# Patient Record
Sex: FEMALE | Race: BLACK OR AFRICAN AMERICAN | Employment: FULL TIME | URBAN - METROPOLITAN AREA
[De-identification: names, ages, dates, MRNs, and addresses within clinical notes are randomized per-mention and may not be internally consistent; named-entity substitution may affect disease eponyms.]

---

## 2017-02-03 ENCOUNTER — HOSPITAL ENCOUNTER (OUTPATIENT)
Dept: MAMMOGRAPHY | Age: 52
Discharge: HOME | End: 2017-02-03
Attending: INTERNAL MEDICINE
Payer: COMMERCIAL

## 2017-02-03 DIAGNOSIS — N63.20 MASS OF BREAST, LEFT: ICD-10-CM

## 2017-02-03 DIAGNOSIS — R92.1 BREAST CALCIFICATION, RIGHT: ICD-10-CM

## 2017-02-03 PROCEDURE — 77066 DX MAMMO INCL CAD BI: CPT

## 2017-03-21 ENCOUNTER — HOSPITAL ENCOUNTER (EMERGENCY)
Age: 52
Discharge: HOME | End: 2017-03-21
Attending: EMERGENCY MEDICINE | Admitting: EMERGENCY MEDICINE
Payer: COMMERCIAL

## 2017-03-21 VITALS
SYSTOLIC BLOOD PRESSURE: 137 MMHG | HEART RATE: 101 BPM | TEMPERATURE: 100.5 F | BODY MASS INDEX: 50.61 KG/M2 | OXYGEN SATURATION: 99 % | DIASTOLIC BLOOD PRESSURE: 74 MMHG | WEIGHT: 275 LBS | RESPIRATION RATE: 18 BRPM | HEIGHT: 62 IN

## 2017-03-21 DIAGNOSIS — J02.0 ACUTE STREPTOCOCCAL PHARYNGITIS: ICD-10-CM

## 2017-03-21 DIAGNOSIS — N30.00 ACUTE CYSTITIS WITHOUT HEMATURIA: Primary | ICD-10-CM

## 2017-03-21 LAB
ALBUMIN SERPL BCP-MCNC: 3.2 G/DL (ref 3.4–5)
ALBUMIN/GLOB SERPL: 0.7 {RATIO} (ref 0.8–1.7)
ALP SERPL-CCNC: 89 U/L (ref 45–117)
ALT SERPL-CCNC: 18 U/L (ref 13–56)
ANION GAP BLD CALC-SCNC: 7 MMOL/L (ref 3–18)
APPEARANCE UR: CLEAR
AST SERPL W P-5'-P-CCNC: 12 U/L (ref 15–37)
BACTERIA URNS QL MICRO: ABNORMAL /HPF
BASOPHILS # BLD AUTO: 0 K/UL (ref 0–0.06)
BASOPHILS # BLD: 0 % (ref 0–2)
BILIRUB SERPL-MCNC: 0.3 MG/DL (ref 0.2–1)
BILIRUB UR QL: NEGATIVE
BUN SERPL-MCNC: 8 MG/DL (ref 7–18)
BUN/CREAT SERPL: 11 (ref 12–20)
CALCIUM SERPL-MCNC: 9.8 MG/DL (ref 8.5–10.1)
CHLORIDE SERPL-SCNC: 98 MMOL/L (ref 100–108)
CO2 SERPL-SCNC: 31 MMOL/L (ref 21–32)
COLOR UR: YELLOW
CREAT SERPL-MCNC: 0.74 MG/DL (ref 0.6–1.3)
DIFFERENTIAL METHOD BLD: ABNORMAL
EOSINOPHIL # BLD: 0 K/UL (ref 0–0.4)
EOSINOPHIL NFR BLD: 0 % (ref 0–5)
EPITH CASTS URNS QL MICRO: ABNORMAL /LPF (ref 0–5)
ERYTHROCYTE [DISTWIDTH] IN BLOOD BY AUTOMATED COUNT: 15.7 % (ref 11.6–14.5)
FLUAV AG NPH QL IA: NEGATIVE
FLUBV AG NOSE QL IA: NEGATIVE
GLOBULIN SER CALC-MCNC: 4.7 G/DL (ref 2–4)
GLUCOSE SERPL-MCNC: 189 MG/DL (ref 74–99)
GLUCOSE UR STRIP.AUTO-MCNC: NEGATIVE MG/DL
HCT VFR BLD AUTO: 35.6 % (ref 35–45)
HGB BLD-MCNC: 11.3 G/DL (ref 12–16)
HGB UR QL STRIP: NEGATIVE
KETONES UR QL STRIP.AUTO: NEGATIVE MG/DL
LEUKOCYTE ESTERASE UR QL STRIP.AUTO: ABNORMAL
LIPASE SERPL-CCNC: 46 U/L (ref 73–393)
LYMPHOCYTES # BLD AUTO: 16 % (ref 21–52)
LYMPHOCYTES # BLD: 2.4 K/UL (ref 0.9–3.6)
MCH RBC QN AUTO: 24.2 PG (ref 24–34)
MCHC RBC AUTO-ENTMCNC: 31.7 G/DL (ref 31–37)
MCV RBC AUTO: 76.4 FL (ref 74–97)
MONOCYTES # BLD: 1.2 K/UL (ref 0.05–1.2)
MONOCYTES NFR BLD AUTO: 8 % (ref 3–10)
MUCOUS THREADS URNS QL MICRO: ABNORMAL /LPF
NEUTS SEG # BLD: 11.5 K/UL (ref 1.8–8)
NEUTS SEG NFR BLD AUTO: 76 % (ref 40–73)
NITRITE UR QL STRIP.AUTO: NEGATIVE
PH UR STRIP: 8 [PH] (ref 5–8)
PLATELET # BLD AUTO: 380 K/UL (ref 135–420)
PMV BLD AUTO: 10.4 FL (ref 9.2–11.8)
POTASSIUM SERPL-SCNC: 3.5 MMOL/L (ref 3.5–5.5)
PROT SERPL-MCNC: 7.9 G/DL (ref 6.4–8.2)
PROT UR STRIP-MCNC: ABNORMAL MG/DL
RBC # BLD AUTO: 4.66 M/UL (ref 4.2–5.3)
RBC #/AREA URNS HPF: ABNORMAL /HPF (ref 0–5)
SODIUM SERPL-SCNC: 136 MMOL/L (ref 136–145)
SP GR UR REFRACTOMETRY: 1.02 (ref 1–1.03)
UROBILINOGEN UR QL STRIP.AUTO: 1 EU/DL (ref 0.2–1)
WBC # BLD AUTO: 15.1 K/UL (ref 4.6–13.2)
WBC URNS QL MICRO: ABNORMAL /HPF (ref 0–4)

## 2017-03-21 PROCEDURE — 74011250636 HC RX REV CODE- 250/636: Performed by: PHYSICIAN ASSISTANT

## 2017-03-21 PROCEDURE — 80053 COMPREHEN METABOLIC PANEL: CPT

## 2017-03-21 PROCEDURE — 83690 ASSAY OF LIPASE: CPT

## 2017-03-21 PROCEDURE — 74011250637 HC RX REV CODE- 250/637: Performed by: EMERGENCY MEDICINE

## 2017-03-21 PROCEDURE — 87081 CULTURE SCREEN ONLY: CPT | Performed by: EMERGENCY MEDICINE

## 2017-03-21 PROCEDURE — 96361 HYDRATE IV INFUSION ADD-ON: CPT

## 2017-03-21 PROCEDURE — 99283 EMERGENCY DEPT VISIT LOW MDM: CPT

## 2017-03-21 PROCEDURE — 85025 COMPLETE CBC W/AUTO DIFF WBC: CPT

## 2017-03-21 PROCEDURE — 81001 URINALYSIS AUTO W/SCOPE: CPT | Performed by: EMERGENCY MEDICINE

## 2017-03-21 PROCEDURE — 87804 INFLUENZA ASSAY W/OPTIC: CPT

## 2017-03-21 PROCEDURE — 96374 THER/PROPH/DIAG INJ IV PUSH: CPT

## 2017-03-21 RX ORDER — KETOROLAC TROMETHAMINE 30 MG/ML
30 INJECTION, SOLUTION INTRAMUSCULAR; INTRAVENOUS
Status: COMPLETED | OUTPATIENT
Start: 2017-03-21 | End: 2017-03-21

## 2017-03-21 RX ORDER — AMOXICILLIN AND CLAVULANATE POTASSIUM 875; 125 MG/1; MG/1
1 TABLET, FILM COATED ORAL 2 TIMES DAILY
Qty: 14 TAB | Refills: 0 | Status: SHIPPED | OUTPATIENT
Start: 2017-03-21 | End: 2017-03-28

## 2017-03-21 RX ORDER — ERGOCALCIFEROL 1.25 MG/1
5000 CAPSULE ORAL DAILY
COMMUNITY
End: 2018-04-22

## 2017-03-21 RX ORDER — ACETAMINOPHEN 500 MG
1000 TABLET ORAL
Status: COMPLETED | OUTPATIENT
Start: 2017-03-21 | End: 2017-03-21

## 2017-03-21 RX ORDER — NITROFURANTOIN 25; 75 MG/1; MG/1
100 CAPSULE ORAL 2 TIMES DAILY
Qty: 6 CAP | Refills: 0 | Status: SHIPPED | OUTPATIENT
Start: 2017-03-21 | End: 2017-03-24

## 2017-03-21 RX ORDER — TRAMADOL HYDROCHLORIDE 50 MG/1
50 TABLET ORAL
Qty: 12 TAB | Refills: 0 | Status: SHIPPED | OUTPATIENT
Start: 2017-03-21 | End: 2018-04-22

## 2017-03-21 RX ADMIN — KETOROLAC TROMETHAMINE 30 MG: 30 INJECTION INTRAMUSCULAR; INTRAVENOUS at 13:04

## 2017-03-21 RX ADMIN — ACETAMINOPHEN 1000 MG: 500 TABLET ORAL at 12:21

## 2017-03-21 RX ADMIN — SODIUM CHLORIDE 1000 ML: 900 INJECTION, SOLUTION INTRAVENOUS at 13:00

## 2017-03-21 NOTE — ED PROVIDER NOTES
Patient is a 49 y.o. female presenting with sore throat, ear pain, and abdominal pain. The history is provided by the patient. Sore Throat    This is a new problem. The current episode started yesterday. The problem has been gradually worsening. There has been a fever of 100 - 100.9 F. The fever has been present for less than 1 day. Associated symptoms include ear pain and trouble swallowing. Pertinent negatives include no diarrhea, no vomiting, no congestion, no drooling, no ear discharge, no headaches, no plugged ear sensation, no shortness of breath, no stridor, no swollen glands, no stiff neck and no cough. She has had no exposure to strep or mono. She has tried nothing for the symptoms. Ear Pain    Associated symptoms include sore throat and abdominal pain. Pertinent negatives include no ear discharge, no headaches, no rhinorrhea, no diarrhea, no vomiting and no cough. Abdominal Pain    This is a new problem. The current episode started 12 to 24 hours ago. The problem occurs constantly. The problem has not changed since onset. The pain is associated with an unknown factor. The pain is located in the periumbilical region and suprapubic region. The quality of the pain is aching and cramping. The pain is at a severity of 10/10. Associated symptoms include a fever. Pertinent negatives include no anorexia, no belching, no diarrhea, no flatus, no hematochezia, no melena, no nausea, no vomiting, no constipation, no dysuria, no frequency, no hematuria, no headaches, no arthralgias, no myalgias, no trauma, no chest pain and no back pain. Nothing worsens the pain. The pain is relieved by nothing. Her past medical history is significant for DM. The patient's surgical history non-contributory.        Past Medical History:   Diagnosis Date    Arthritis     Diabetes ([address]     Hypertension     Morbid obesity ([address]     BMI-50    Psychiatric disorder     CLAUSTROPHOBIA    St[address] 2006    no residuals    Unspecified sleep apnea     uses cpap    Vitamin D deficiency        Past Surgical History:   Procedure Laterality Date    BREAST SURGERY PROCEDURE UNLISTED  1970'S    LEFT BREAST BX X2     COLONOSCOPY N/A 10/7/2016    COLONOSCOPY aborted poor prep performed by Raissa Garay MD at [address] COLONOSCOPY N/A 10/21/2016    COLONOSCOPY performed by Kaleigh Foss MD at [facility]COPY    HX HYSTERECTOMY  2006    HX KNEE ARTHROSCOPY  2005 AND 2012    RIGHT KNEE IN 2005, LEFT KNEE IN 2012    [address]      [address] br. 2016 benign fat necrosis         History reviewed. No pertinent family history. Social History     Social History    Marital status:      Spouse name: N/A    Number of children: N/A    Years of education: N/A     Occupational History    Not on file. Social History Main Topics    Smoking status: Never Smoker    Smokeless tobacco: Never Used    Alcohol use No    Drug use: No    Sexual activity: Not on file     Other Topics Concern    Not on file     Social History Narrative         ALLERGIES: Review of patient's allergies indicates no known allergies. Review of Systems   Constitutional: Positive for fever. Negative for chills. HENT: Positive for ear pain, sore throat and trouble swallowing. Negative for congestion, drooling, ear discharge and rhinorrhea. Eyes: Negative for pain and redness. Respiratory: Negative for cough, shortness of breath and stridor. Cardiovascular: Negative for chest pain. Gastrointestinal: Positive for abdominal pain. Negative for abdominal distention, anorexia, constipation, diarrhea, flatus, hematochezia, melena, nausea and vomiting. Genitourinary: Negative for dysuria, frequency, hematuria, vaginal bleeding, vaginal discharge and vaginal pain. Musculoskeletal: Negative for arthralgias, back pain and myalgias. Skin: Negative. Neurological: Negative for dizziness, weakness, light-headedness and headaches. Psychiatric/Behavioral: Negative. Vitals:    03/21/17 1128   BP: 137/74   Pulse: (!) 101   Resp: 18   Temp: (!) 100.5 °F (38.1 °C)   SpO2: 99%   Weight: 124.7 kg (275 lb)   Height: 5' 2\" (1.575 m)            Physical Exam   Constitutional: She is oriented to person, place, and time. She appears well-developed and well-nourished. No distress. HENT:   Head: Normocephalic and atraumatic. Right Ear: Tympanic membrane, external ear and ear canal normal.   Left Ear: Tympanic membrane, external ear and ear canal normal.   Nose: Nose normal.   Mouth/Throat: Oropharynx is clear and moist and mucous membranes are normal.   Eyes: Conjunctivae and EOM are normal. Pupils are equal, round, and reactive to light. Neck: Normal range of motion. Cardiovascular: Normal rate, regular rhythm and normal heart sounds. Pulmonary/Chest: Effort normal and breath sounds normal. No respiratory distress. She has no wheezes. She has no rales. Abdominal: Soft. Normal appearance and bowel sounds are normal. She exhibits no distension. There is tenderness in the right lower quadrant, periumbilical area, suprapubic area and left lower quadrant. There is no rigidity, no rebound, no guarding, no CVA tenderness, no tenderness at McBurney's point and negative Cordero's sign. Musculoskeletal: Normal range of motion. Neurological: She is alert and oriented to person, place, and time. Skin: Skin is warm and dry. She is not diaphoretic. Psychiatric: She has a normal mood and affect. Her behavior is normal. Judgment and thought content normal.   Nursing note and vitals reviewed.        MDM  Number of Diagnoses or Management Options  Acute cystitis without hematuria: new and requires workup  Acute streptococcal pharyngitis: new and requires workup  Diagnosis management comments: DDx: gastroenteritis, GERD, hernia, hepatitis, pancreatitis, gallbladder etiology, constipation, adhesions, UTI, pyelo, kidney stones, STD, Jyzl-Ewzq-Ggkzsm syndrome, preg, ectopic, ovarian cyst, ovarian torsion, tubo-ovarian abscess, appendicitis, diverticulitis, SBO, GI bleed, mesenteric ischemia, AAA, cardiac etiology, musculoskeletal pain/spasm, malignancy    UA with UTI and strep positive. Pt reports she is feeling better with the toradol. Pt non-toxic appearing and stable for discharge to home. IMPRESSION AND MEDICAL DECISION MAKING:  Based upon the patient's presentation with noted HPI and PE, along with the work up done in the emergency department, I believe that the patient is having strep pharyngitis and UTI, no signs of airway compromise. Will discharge to home with antibiotics. Discussed results, care in ED and further care, f/u and s/s warranting return to ED. Pt and family (if present) understood and agreed to plan. SPECIFIC PATIENT INSTRUCTIONS FROM THE PHYSICIAN WHO TREATED YOU IN THE ER TODAY:  Finish antibiotics as directed. Warm salt water gargles may be used as needed to sooth sore throat   Use over-the-counter Chloraseptics  Take Tylenol/Acetaminophen (every 4-6 hours) and/or Motrin/Ibuprofen/Advil (every 6-8 hours) or Naprosyn/Naproxyn/Aleve for fever or pain as needed. Take tramadol for pain not controlled with these medications. Follow up with your doctor or the provided referral for further evaluation and management  Return to emergency room for worsening or new symptoms.         Amount and/or Complexity of Data Reviewed  Clinical lab tests: ordered and reviewed  Tests in the medicine section of CPT®: ordered and reviewed  Discussion of test results with the performing providers: yes  Decide to obtain previous medical records or to obtain history from someone other than the patient: yes  Obtain history from someone other than the patient: yes  Review and summarize past medical records: yes  Discuss the patient with other providers: yes    Risk of Complications, Morbidity, and/or Mortality  Presenting problems: moderate  Diagnostic procedures: moderate  Management options: moderate    Patient Progress  Patient progress: stable    ED Course       Procedures    Diagnosis:   1. Acute cystitis without hematuria    2. Acute streptococcal pharyngitis          Disposition: home    Follow-up Information     Follow up With Details Comments Contact Info    [facility] EMERGENCY DEPT  As needed, If symptoms worsen [address] Connor Theodore MD In 3 days  [address] [facility] [address]            Patient's Medications   Start Taking    AMOXICILLIN-CLAVULANATE (AUGMENTIN) 875-125 MG PER TABLET    Take 1 Tab by mouth two (2) times a day for 7 days. NITROFURANTOIN, MACROCRYSTAL-MONOHYDRATE, (MACROBID) 100 MG CAPSULE    Take 1 Cap by mouth two (2) times a day for 3 days. TRAMADOL (ULTRAM) 50 MG TABLET    Take 1 Tab by mouth every six (6) hours as needed for Pain. Max Daily Amount: 200 mg. Continue Taking    ASPIRIN 81 MG CHEWABLE TABLET    Take 81 mg by mouth daily. DICLOFENAC EC (VOLTAREN) 75 MG EC TABLET    Take 75 mg by mouth two (2) times a day. DILTIAZEM  MG TB24    Take 1 Tab by mouth two (2) times a day. Indications: HYPERTENSION    ERGOCALCIFEROL (VITAMIN D2) 50,000 UNIT CAPSULE    Take 50,000 Units by mouth daily. GABAPENTIN (NEURONTIN) 300 MG CAPSULE    Take 300 mg by mouth three (3) times daily. Indications: NEUROPATHIC PAIN    HYDROCHLOROTHIAZIDE (HYDRODIURIL) 25 MG TABLET    Take 25 mg by mouth daily. Indications: HYPERTENSION    INSULIN GLARGINE (LANTUS) 100 UNIT/ML INJECTION    53 Units by SubCUTAneous route daily. Indications: DIABETES MELLITUS    LISINOPRIL (PRINIVIL, ZESTRIL) 30 MG TABLET    Take 30 mg by mouth two (2) times a day. METFORMIN (GLUCOPHAGE) 500 MG TABLET    Take 1,000 mg by mouth two (2) times daily (with meals). SIMVASTATIN (ZOCOR) 40 MG TABLET    Take 40 mg by mouth nightly.    These Medications have changed    No medications on file   Stop Taking    No medications on file

## 2017-03-21 NOTE — ED TRIAGE NOTES
Patient states difficulty swallowing related to pain from sore throat. States onset of sore throat and left ear pain yesterday. She states pelvic pain without vaginal discharge or urinary difficulties.

## 2017-03-21 NOTE — LETTER
[facility] EMERGENCY DEPT 
[address] [facility] [id number] 
[phone] Work/School Note Date: 3/21/2017 To Whom It May concern: 
 
Maritza Ramsey was seen and treated today in the emergency room by the following provider(s): 
Attending Provider: Radha Taylor MD 
Physician Assistant: Sander Self PA-C. Maritza Ramsye may return to work on 03/24/2017. Sincerely, Sander Self PA-C

## 2017-03-22 LAB
B-HEM STREP THROAT QL CULT: POSITIVE
BACTERIA SPEC CULT: ABNORMAL
SERVICE CMNT-IMP: ABNORMAL

## 2017-03-27 PROCEDURE — 96361 HYDRATE IV INFUSION ADD-ON: CPT

## 2017-03-27 PROCEDURE — 99284 EMERGENCY DEPT VISIT MOD MDM: CPT

## 2017-03-27 PROCEDURE — 96374 THER/PROPH/DIAG INJ IV PUSH: CPT

## 2017-03-28 ENCOUNTER — APPOINTMENT (OUTPATIENT)
Dept: CT IMAGING | Age: 52
End: 2017-03-28
Attending: EMERGENCY MEDICINE
Payer: COMMERCIAL

## 2017-03-28 ENCOUNTER — HOSPITAL ENCOUNTER (EMERGENCY)
Age: 52
Discharge: HOME | End: 2017-03-28
Attending: EMERGENCY MEDICINE
Payer: COMMERCIAL

## 2017-03-28 VITALS
TEMPERATURE: 98.9 F | DIASTOLIC BLOOD PRESSURE: 50 MMHG | SYSTOLIC BLOOD PRESSURE: 132 MMHG | HEIGHT: 63 IN | WEIGHT: 274 LBS | HEART RATE: 74 BPM | OXYGEN SATURATION: 99 % | RESPIRATION RATE: 14 BRPM | BODY MASS INDEX: 48.55 KG/M2

## 2017-03-28 DIAGNOSIS — R73.9 HYPERGLYCEMIA: ICD-10-CM

## 2017-03-28 DIAGNOSIS — R10.9 ABDOMINAL PAIN, UNSPECIFIED LOCATION: Primary | ICD-10-CM

## 2017-03-28 LAB
ANION GAP BLD CALC-SCNC: 9 MMOL/L (ref 3–18)
APPEARANCE UR: CLEAR
BASOPHILS # BLD AUTO: 0 K/UL (ref 0–0.1)
BASOPHILS # BLD: 0 % (ref 0–3)
BILIRUB UR QL: NEGATIVE
BUN SERPL-MCNC: 9 MG/DL (ref 7–18)
BUN/CREAT SERPL: 13 (ref 12–20)
CALCIUM SERPL-MCNC: 9.2 MG/DL (ref 8.5–10.1)
CHLORIDE SERPL-SCNC: 99 MMOL/L (ref 100–108)
CO2 SERPL-SCNC: 29 MMOL/L (ref 21–32)
COLOR UR: YELLOW
CREAT SERPL-MCNC: 0.7 MG/DL (ref 0.6–1.3)
DIFFERENTIAL METHOD BLD: ABNORMAL
EOSINOPHIL # BLD: 0.4 K/UL (ref 0–0.4)
EOSINOPHIL NFR BLD: 4 % (ref 0–5)
ERYTHROCYTE [DISTWIDTH] IN BLOOD BY AUTOMATED COUNT: 14.9 % (ref 11.6–14.5)
GLUCOSE BLD STRIP.AUTO-MCNC: 308 MG/DL (ref 70–110)
GLUCOSE BLD STRIP.AUTO-MCNC: 373 MG/DL (ref 70–110)
GLUCOSE SERPL-MCNC: 398 MG/DL (ref 74–99)
GLUCOSE UR STRIP.AUTO-MCNC: >1000 MG/DL
HCT VFR BLD AUTO: 33.5 % (ref 35–45)
HGB BLD-MCNC: 10.5 G/DL (ref 12–16)
HGB UR QL STRIP: NEGATIVE
KETONES UR QL STRIP.AUTO: NEGATIVE MG/DL
LEUKOCYTE ESTERASE UR QL STRIP.AUTO: NEGATIVE
LYMPHOCYTES # BLD AUTO: 23 % (ref 20–51)
LYMPHOCYTES # BLD: 2.5 K/UL (ref 0.8–3.5)
MCH RBC QN AUTO: 24 PG (ref 24–34)
MCHC RBC AUTO-ENTMCNC: 31.3 G/DL (ref 31–37)
MCV RBC AUTO: 76.5 FL (ref 74–97)
MONOCYTES # BLD: 0.5 K/UL (ref 0–1)
MONOCYTES NFR BLD AUTO: 5 % (ref 2–9)
NEUTS SEG # BLD: 7.4 K/UL (ref 1.8–8)
NEUTS SEG NFR BLD AUTO: 68 % (ref 42–75)
NITRITE UR QL STRIP.AUTO: NEGATIVE
PH UR STRIP: 5.5 [PH] (ref 5–8)
PLATELET # BLD AUTO: 405 K/UL (ref 135–420)
PLATELET COMMENTS,PCOM: ABNORMAL
PMV BLD AUTO: 10 FL (ref 9.2–11.8)
POTASSIUM SERPL-SCNC: 4 MMOL/L (ref 3.5–5.5)
PROT UR STRIP-MCNC: NEGATIVE MG/DL
RBC # BLD AUTO: 4.38 M/UL (ref 4.2–5.3)
RBC MORPH BLD: ABNORMAL
RBC MORPH BLD: ABNORMAL
SODIUM SERPL-SCNC: 137 MMOL/L (ref 136–145)
SP GR UR REFRACTOMETRY: >1.03 (ref 1–1.03)
UROBILINOGEN UR QL STRIP.AUTO: 0.2 EU/DL (ref 0.2–1)
WBC # BLD AUTO: 10.8 K/UL (ref 4.6–13.2)

## 2017-03-28 PROCEDURE — 82962 GLUCOSE BLOOD TEST: CPT

## 2017-03-28 PROCEDURE — 74011250637 HC RX REV CODE- 250/637: Performed by: EMERGENCY MEDICINE

## 2017-03-28 PROCEDURE — 81003 URINALYSIS AUTO W/O SCOPE: CPT | Performed by: EMERGENCY MEDICINE

## 2017-03-28 PROCEDURE — 74011250636 HC RX REV CODE- 250/636: Performed by: EMERGENCY MEDICINE

## 2017-03-28 PROCEDURE — 85025 COMPLETE CBC W/AUTO DIFF WBC: CPT | Performed by: EMERGENCY MEDICINE

## 2017-03-28 PROCEDURE — 80048 BASIC METABOLIC PNL TOTAL CA: CPT | Performed by: EMERGENCY MEDICINE

## 2017-03-28 PROCEDURE — 74176 CT ABD & PELVIS W/O CONTRAST: CPT

## 2017-03-28 RX ORDER — ONDANSETRON 4 MG/1
4 TABLET, ORALLY DISINTEGRATING ORAL
Status: COMPLETED | OUTPATIENT
Start: 2017-03-28 | End: 2017-03-28

## 2017-03-28 RX ORDER — ONDANSETRON 4 MG/1
TABLET, ORALLY DISINTEGRATING ORAL
Status: DISCONTINUED
Start: 2017-03-28 | End: 2017-03-28 | Stop reason: HOSPADM

## 2017-03-28 RX ORDER — OXYCODONE AND ACETAMINOPHEN 5; 325 MG/1; MG/1
1 TABLET ORAL
Status: COMPLETED | OUTPATIENT
Start: 2017-03-28 | End: 2017-03-28

## 2017-03-28 RX ORDER — OXYCODONE AND ACETAMINOPHEN 5; 325 MG/1; MG/1
TABLET ORAL
Status: DISCONTINUED
Start: 2017-03-28 | End: 2017-03-28 | Stop reason: HOSPADM

## 2017-03-28 RX ORDER — SODIUM CHLORIDE 9 MG/ML
1000 INJECTION, SOLUTION INTRAVENOUS ONCE
Status: COMPLETED | OUTPATIENT
Start: 2017-03-28 | End: 2017-03-28

## 2017-03-28 RX ORDER — OXYCODONE AND ACETAMINOPHEN 5; 325 MG/1; MG/1
1 TABLET ORAL
Qty: 12 TAB | Refills: 0 | Status: SHIPPED | OUTPATIENT
Start: 2017-03-28 | End: 2018-04-22

## 2017-03-28 RX ORDER — ONDANSETRON 4 MG/1
4 TABLET, ORALLY DISINTEGRATING ORAL
Qty: 14 TAB | Refills: 0 | Status: SHIPPED | OUTPATIENT
Start: 2017-03-28 | End: 2017-10-30

## 2017-03-28 RX ADMIN — ONDANSETRON 4 MG: 4 TABLET, ORALLY DISINTEGRATING ORAL at 01:09

## 2017-03-28 RX ADMIN — OXYCODONE HYDROCHLORIDE AND ACETAMINOPHEN 1 TABLET: 5; 325 TABLET ORAL at 01:09

## 2017-03-28 RX ADMIN — INSULIN HUMAN 6 UNITS: 100 INJECTION, SOLUTION PARENTERAL at 02:29

## 2017-03-28 RX ADMIN — SODIUM CHLORIDE 1000 ML: 900 INJECTION, SOLUTION INTRAVENOUS at 02:29

## 2017-03-28 NOTE — DISCHARGE INSTRUCTIONS
Return for pain, fever, shortness of breath, vomiting, decreased fluid intake, weakness, numbness, dizziness, or any change or concerns. Abdominal Pain: Care Instructions  Your Care Instructions    Abdominal pain has many possible causes. Some aren't serious and get better on their own in a few days. Others need more testing and treatment. If your pain continues or gets worse, you need to be rechecked and may need more tests to find out what is wrong. You may need surgery to correct the problem. Don't ignore new symptoms, such as fever, nausea and vomiting, urination problems, pain that gets worse, and dizziness. These may be signs of a more serious problem. Your doctor may have recommended a follow-up visit in the next 8 to 12 hours. If you are not getting better, you may need more tests or treatment. The doctor has checked you carefully, but problems can develop later. If you notice any problems or new symptoms, get medical treatment right away. Follow-up care is a key part of your treatment and safety. Be sure to make and go to all appointments, and call your doctor if you are having problems. It's also a good idea to know your test results and keep a list of the medicines you take. How can you care for yourself at home? · Rest until you feel better. · To prevent dehydration, drink plenty of fluids, enough so that your urine is light yellow or clear like water. Choose water and other caffeine-free clear liquids until you feel better. If you have kidney, heart, or liver disease and have to limit fluids, talk with your doctor before you increase the amount of fluids you drink. · If your stomach is upset, eat mild foods, such as rice, dry toast or crackers, bananas, and applesauce. Try eating several small meals instead of two or three large ones. · Wait until 48 hours after all symptoms have gone away before you have spicy foods, alcohol, and drinks that contain caffeine.   · Do not eat foods that are high in fat. · Avoid anti-inflammatory medicines such as aspirin, ibuprofen (Advil, Motrin), and naproxen (Aleve). These can cause stomach upset. Talk to your doctor if you take daily aspirin for another health problem. When should you call for help? Call 911 anytime you think you may need emergency care. For example, call if:  · You passed out (lost consciousness). · You pass maroon or very bloody stools. · You vomit blood or what looks like coffee grounds. · You have new, severe belly pain. Call your doctor now or seek immediate medical care if:  · Your pain gets worse, especially if it becomes focused in one area of your belly. · You have a new or higher fever. · Your stools are black and look like tar, or they have streaks of blood. · You have unexpected vaginal bleeding. · You have symptoms of a urinary tract infection. These may include:  ¨ Pain when you urinate. ¨ Urinating more often than usual.  ¨ Blood in your urine. · You are dizzy or lightheaded, or you feel like you may faint. Watch closely for changes in your health, and be sure to contact your doctor if:  · You are not getting better after 1 day (24 hours). Where can you learn more? Go to http://diane-calus.info/. Enter U086 in the search box to learn more about \"Abdominal Pain: Care Instructions. \"  Current as of: May 27, 2016  Content Version: 11.1  © 7191-6092 [employer]. Care instructions adapted under license by [employer] (which disclaims liability or warranty for this information). If you have questions about a medical condition or this instruction, always ask your healthcare professional. [place] [address] any warranty or liability for your use of this information. Learning About High Blood Sugar  What is high blood sugar? Your body turns the food you eat into glucose (sugar), which it uses for energy.  But if your body isn't able to use the sugar right away, it can build up in your blood and lead to high blood sugar. When the amount of sugar in your blood stays too high for too much of the time, you may have diabetes. Diabetes is a disease that can cause serious health problems. The good news is that lifestyle changes may help you get your blood sugar back to normal and avoid or delay diabetes. What causes high blood sugar? Sugar (glucose) can build up in your blood if you:  · Are overweight. · Have a family history of diabetes. · Take certain medicines, such as steroids. What are the symptoms? Having high blood sugar may not cause any symptoms at all. Or it may make you feel very thirsty or very hungry. You may also urinate more often than usual, have blurry vision, or lose weight without trying. How is high blood sugar treated? You can take steps to lower your blood sugar level if you understand what makes it get higher. Your doctor may want you to learn how to test your blood sugar level at home. Then you can see how illness, stress, or different kinds of food or medicine raise or lower your blood sugar level. Other tests may be needed to see if you have diabetes. How can you prevent high blood sugar? · Watch your weight. If you're overweight, losing just a small amount of weight may help. Reducing fat around your waist is most important. · Limit the amount of calories, sweets, and unhealthy fat you eat. Ask your doctor if a dietitian can help you. A registered dietitian can help you create meal plans that fit your lifestyle. · Get at least 30 minutes of exercise on most days of the week. Exercise helps control your blood sugar. It also helps you maintain a healthy weight. Walking is a good choice. You also may want to do other activities, such as running, swimming, cycling, or playing tennis or team sports. · If your doctor prescribed medicines, take them exactly as prescribed.  Call your doctor if you think you are having a problem with your medicine. You will get more details on the specific medicines your doctor prescribes. Follow-up care is a key part of your treatment and safety. Be sure to make and go to all appointments, and call your doctor if you are having problems. It's also a good idea to know your test results and keep a list of the medicines you take. Where can you learn more? Go to http://diane-claus.info/. Enter O108 in the search box to learn more about \"Learning About High Blood Sugar. \"  Current as of: May 23, 2016  Content Version: 11.1  © 7376-8153 [employer]. Care instructions adapted under license by [employer] (which disclaims liability or warranty for this information). If you have questions about a medical condition or this instruction, always ask your healthcare professional. [address] any warranty or liability for your use of this information.

## 2017-03-28 NOTE — ED NOTES
I have reviewed discharge instructions with the patient. The patient verbalized understanding.   Pts family member to drive home

## 2017-03-28 NOTE — ED PROVIDER NOTES
HPI Comments: 12:33 AM Mark Pabon is a 49 y.o. Female with a history of HTN, DM, and stroke presenting to the ED with R flank pain that began yesterday. The patient also reports R abd pain as an associated symptom. The patient was diagnosed with UTI one week ago and finished her antibiotics 3 days ago. She states the pain is so sever it hurts to walk. She has never had this pain before. She rates her pain as 10/10. Pt denies SOB, constipation, dysuria, nausea, vomiting, diarrhea, fever, CP, and cough. No other complaints at this time. Patient is a 49 y.o. female presenting with abdominal pain. The history is provided by the patient. Abdominal Pain    Pertinent negatives include no fever, no vomiting, no chest pain and no back pain. Past Medical History:   Diagnosis Date    Arthritis     [facility]     Hypertension     Morbid obesity ([facility]     BMI-50    Psychiatric disorder     CLAUSTROPHOBIA    Stroke ([facility] 2006    no residuals    Unspecified sleep apnea     uses cpap    Vitamin D deficiency        Past Surgical History:   Procedure Laterality Date    BREAST SURGERY PROCEDURE UNLISTED  1970'S    LEFT BREAST BX X2     COLONOSCOPY N/A 10/7/2016    COLONOSCOPY aborted poor prep performed by Zoila Gabriel MD at [address] [place] [address] N/A 10/21/2016    COLONOSCOPY performed by Jennifer Yoon MD at [facility]    HX HYSTERECTOMY  2006    HX KNEE ARTHROSCOPY  2005 AND 2012    RIGHT KNEE IN 2005, LEFT KNEE IN 2012    [address] [facility]      LT. br. 2016 benign fat necrosis         History reviewed. No pertinent family history. Social History     Social History    Marital status:      Spouse name: N/A    Number of children: N/A    Years of education: N/A     Occupational History    Not on file.      Social History Main Topics    Smoking status: Never Smoker    Smokeless tobacco: Never Used    Alcohol use No    Drug use: No    Sexual activity: Not on file     Other Topics Concern    Not on file     Social History Narrative         ALLERGIES: Review of patient's allergies indicates no known allergies. Review of Systems   Constitutional: Negative for fever. HENT: Negative for congestion. Respiratory: Negative for cough and shortness of breath. Cardiovascular: Negative for chest pain. Gastrointestinal: Positive for abdominal pain. Negative for vomiting. Genitourinary: Positive for flank pain. Musculoskeletal: Negative for back pain. Skin: Negative for rash. Neurological: Negative for light-headedness. All other systems reviewed and are negative. Vitals:    03/27/17 2329 03/28/17 0210   BP: (!) 154/95 132/50   Pulse: 78 74   Resp: 16 14   Temp: 98.9 °F (37.2 °C)    SpO2: 99% 100%   Weight: 124.3 kg (274 lb)    Height: 5' 3\" (1.6 m)             Physical Exam   Constitutional: She is oriented to person, place, and time. She appears well-developed. obese   HENT:   Head: Normocephalic. Mouth/Throat: Oropharynx is clear and moist.   Eyes: Pupils are equal, round, and reactive to light. Neck: Normal range of motion. Cardiovascular: Normal rate and normal heart sounds. No murmur heard. Pulmonary/Chest: Effort normal. She has no wheezes. She has no rales. Abdominal: Soft. There is no tenderness. There is CVA tenderness. R flank tenderness   Musculoskeletal: Normal range of motion. She exhibits no edema. Neurological: She is alert and oriented to person, place, and time. Skin: Skin is warm and dry. Nursing note and vitals reviewed.        [facility]  ED Course       Procedures    Vitals:  Patient Vitals for the past 12 hrs:   Temp Pulse Resp BP SpO2   03/28/17 0210 - 74 14 132/50 100 %   03/27/17 2329 98.9 °F (37.2 °C) 78 16 (!) 154/95 99 %         Medications ordered:   Medications   ondansetron (ZOFRAN ODT) 4 mg tablet (not administered)   oxyCODONE-acetaminophen (PERCOCET) 5-325 mg per tablet (not administered) oxyCODONE-acetaminophen (PERCOCET) 5-325 mg per tablet 1 Tab (1 Tab Oral Given 3/28/17 0109)   ondansetron (ZOFRAN ODT) tablet 4 mg (4 mg Oral Given 3/28/17 0109)   0.9% sodium chloride infusion 1,000 mL (1,000 mL IntraVENous New Bag 3/28/17 0229)   insulin regular (NOVOLIN R, HUMULIN R) injection 6 Units (6 Units IntraVENous Given 3/28/17 0229)         Lab findings:  Recent Results (from the past 12 hour(s))   URINALYSIS W/ RFLX MICROSCOPIC    Collection Time: 03/28/17 12:43 AM   Result Value Ref Range    Color YELLOW      Appearance CLEAR      Specific gravity >1.030 (H) 1.005 - 1.030    pH (UA) 5.5 5.0 - 8.0      Protein NEGATIVE  NEG mg/dL    Glucose >1000 (A) NEG mg/dL    Ketone NEGATIVE  NEG mg/dL    Bilirubin NEGATIVE  NEG      Blood NEGATIVE  NEG      Urobilinogen 0.2 0.2 - 1.0 EU/dL    Nitrites NEGATIVE  NEG      Leukocyte Esterase NEGATIVE  NEG     GLUCOSE, POC    Collection Time: 03/28/17  2:12 AM   Result Value Ref Range    Glucose (POC) 373 (H) 70 - 110 mg/dL   CBC WITH AUTOMATED DIFF    Collection Time: 03/28/17  2:20 AM   Result Value Ref Range    WBC 10.8 4.6 - 13.2 K/uL    RBC 4.38 4.20 - 5.30 M/uL    HGB 10.5 (L) 12.0 - 16.0 g/dL    HCT 33.5 (L) 35.0 - 45.0 %    MCV 76.5 74.0 - 97.0 FL    MCH 24.0 24.0 - 34.0 PG    MCHC 31.3 31.0 - 37.0 g/dL    RDW 14.9 (H) 11.6 - 14.5 %    PLATELET 894 757 - 614 K/uL    MPV 10.0 9.2 - 11.8 FL    NEUTROPHILS 68 42 - 75 %    LYMPHOCYTES 23 20 - 51 %    MONOCYTES 5 2 - 9 %    EOSINOPHILS 4 0 - 5 %    BASOPHILS 0 0 - 3 %    ABS. NEUTROPHILS 7.4 1.8 - 8.0 K/UL    ABS. LYMPHOCYTES 2.5 0.8 - 3.5 K/UL    ABS. MONOCYTES 0.5 0 - 1.0 K/UL    ABS. EOSINOPHILS 0.4 0.0 - 0.4 K/UL    ABS.  BASOPHILS 0.0 0.0 - 0.1 K/UL    PLATELET COMMENTS LARGE PLATELETS      RBC COMMENTS ANISOCYTOSIS  1+        RBC COMMENTS POIKILOCYTOSIS  1+        DF MANUAL     METABOLIC PANEL, BASIC    Collection Time: 03/28/17  2:20 AM   Result Value Ref Range    Sodium 137 136 - 145 mmol/L    Potassium 4.0 3.5 - 5.5 mmol/L    Chloride 99 (L) 100 - 108 mmol/L    CO2 29 21 - 32 mmol/L    Anion gap 9 3.0 - 18 mmol/L    Glucose 398 (H) 74 - 99 mg/dL    BUN 9 7.0 - 18 MG/DL    Creatinine 0.70 0.6 - 1.3 MG/DL    BUN/Creatinine ratio 13 12 - 20      GFR est AA >60 >60 ml/min/1.73m2    GFR est non-AA >60 >60 ml/min/1.73m2    Calcium 9.2 8.5 - 10.1 MG/DL   GLUCOSE, POC    Collection Time: 03/28/17  3:16 AM   Result Value Ref Range    Glucose (POC) 308 (H) 70 - 110 mg/dL           X-Ray, CT or other radiology findings or impressions:  CT ABD PELV WO CONT   Final Result          Progress notes, Consult notes or additional Procedure notes:   elev gluc but not c/w dka. Af, nl vitals. Stable for dc and close f/u. No pain on recheck. abd soft and non-tender. No cp, no sob. Det ret inst given. Disposition:  Diagnosis:   1. Abdominal pain, unspecified location    2. Hyperglycemia        Disposition: home    Follow-up Information     Follow up With Details Comments Contact Info    Darin Mckeon MD Schedule an appointment as soon as possible for a visit  [address] [place] [address]             Patient's Medications   Start Taking    ONDANSETRON (ZOFRAN ODT) 4 MG DISINTEGRATING TABLET    Take 1 Tab by mouth every eight (8) hours as needed for Nausea. OXYCODONE-ACETAMINOPHEN (PERCOCET) 5-325 MG PER TABLET    Take 1 Tab by mouth every six (6) hours as needed for Pain. Max Daily Amount: 4 Tabs. Continue Taking    AMOXICILLIN-CLAVULANATE (AUGMENTIN) 875-125 MG PER TABLET    Take 1 Tab by mouth two (2) times a day for 7 days. ASPIRIN 81 MG CHEWABLE TABLET    Take 81 mg by mouth daily. DICLOFENAC EC (VOLTAREN) 75 MG EC TABLET    Take 75 mg by mouth two (2) times a day. DILTIAZEM  MG TB24    Take 1 Tab by mouth two (2) times a day. Indications: HYPERTENSION    ERGOCALCIFEROL (VITAMIN D2) 50,000 UNIT CAPSULE    Take 50,000 Units by mouth daily.     GABAPENTIN (NEURONTIN) 300 MG CAPSULE    Take 300 mg by mouth three (3) times daily. Indications: NEUROPATHIC PAIN    HYDROCHLOROTHIAZIDE (HYDRODIURIL) 25 MG TABLET    Take 25 mg by mouth daily. Indications: HYPERTENSION    INSULIN GLARGINE (LANTUS) 100 UNIT/ML INJECTION    53 Units by SubCUTAneous route daily. Indications: DIABETES MELLITUS    LISINOPRIL (PRINIVIL, ZESTRIL) 30 MG TABLET    Take 30 mg by mouth two (2) times a day. METFORMIN (GLUCOPHAGE) 500 MG TABLET    Take 1,000 mg by mouth two (2) times daily (with meals). SIMVASTATIN (ZOCOR) 40 MG TABLET    Take 40 mg by mouth nightly. TRAMADOL (ULTRAM) 50 MG TABLET    Take 1 Tab by mouth every six (6) hours as needed for Pain. Max Daily Amount: 200 mg. These Medications have changed    No medications on file   Stop Taking    No medications on file     SCRIBE ATTESTATION STATEMENT  Documented by: Serafin London scribing for, and in the presence Juan Mosquera MD      Signed by: Sid Malagon, 03/28/17, 12:55 AM    PROVIDER ATTESTATION STATEMENT  I personally performed the services described in the documentation, reviewed the documentation, as recorded by the scribe in my presence, and it accurately and completely records my words and actions.   Layo Groves MD

## 2017-03-28 NOTE — ED TRIAGE NOTES
Pt with right side abd and flank pain since yesterday  Pt with hx uti 1 week ago, pt states she finished the 3 days of antibiotics.

## 2017-10-30 ENCOUNTER — HOSPITAL ENCOUNTER (EMERGENCY)
Age: 52
Discharge: HOME | End: 2017-10-30
Attending: EMERGENCY MEDICINE | Admitting: EMERGENCY MEDICINE
Payer: COMMERCIAL

## 2017-10-30 ENCOUNTER — APPOINTMENT (OUTPATIENT)
Dept: GENERAL RADIOLOGY | Age: 52
End: 2017-10-30
Attending: EMERGENCY MEDICINE
Payer: COMMERCIAL

## 2017-10-30 VITALS
RESPIRATION RATE: 17 BRPM | WEIGHT: 265 LBS | HEART RATE: 66 BPM | SYSTOLIC BLOOD PRESSURE: 96 MMHG | OXYGEN SATURATION: 98 % | TEMPERATURE: 98 F | DIASTOLIC BLOOD PRESSURE: 77 MMHG | BODY MASS INDEX: 46.94 KG/M2

## 2017-10-30 DIAGNOSIS — R07.9 CHEST PAIN, UNSPECIFIED TYPE: Primary | ICD-10-CM

## 2017-10-30 LAB
ANION GAP SERPL CALC-SCNC: 5 MMOL/L (ref 3–18)
BASOPHILS # BLD: 0.1 K/UL (ref 0–0.06)
BASOPHILS NFR BLD: 1 % (ref 0–2)
BUN SERPL-MCNC: 14 MG/DL (ref 7–18)
BUN/CREAT SERPL: 17 (ref 12–20)
CALCIUM SERPL-MCNC: 9.2 MG/DL (ref 8.5–10.1)
CHLORIDE SERPL-SCNC: 102 MMOL/L (ref 100–108)
CK MB CFR SERPL CALC: NORMAL % (ref 0–4)
CK MB SERPL-MCNC: <1 NG/ML (ref 5–25)
CK SERPL-CCNC: 114 U/L (ref 26–192)
CO2 SERPL-SCNC: 31 MMOL/L (ref 21–32)
CREAT SERPL-MCNC: 0.81 MG/DL (ref 0.6–1.3)
D DIMER PPP FEU-MCNC: <0.27 UG/ML(FEU)
DIFFERENTIAL METHOD BLD: ABNORMAL
EOSINOPHIL # BLD: 0.1 K/UL (ref 0–0.4)
EOSINOPHIL NFR BLD: 1 % (ref 0–5)
ERYTHROCYTE [DISTWIDTH] IN BLOOD BY AUTOMATED COUNT: 15.5 % (ref 11.6–14.5)
GLUCOSE SERPL-MCNC: 255 MG/DL (ref 74–99)
HCT VFR BLD AUTO: 34.5 % (ref 35–45)
HGB BLD-MCNC: 10.7 G/DL (ref 12–16)
LIPASE SERPL-CCNC: 70 U/L (ref 73–393)
LYMPHOCYTES # BLD: 4.7 K/UL (ref 0.9–3.6)
LYMPHOCYTES NFR BLD: 44 % (ref 21–52)
MCH RBC QN AUTO: 24.1 PG (ref 24–34)
MCHC RBC AUTO-ENTMCNC: 31 G/DL (ref 31–37)
MCV RBC AUTO: 77.7 FL (ref 74–97)
MONOCYTES # BLD: 0.7 K/UL (ref 0.05–1.2)
MONOCYTES NFR BLD: 6 % (ref 3–10)
NEUTS SEG # BLD: 5.3 K/UL (ref 1.8–8)
NEUTS SEG NFR BLD: 48 % (ref 40–73)
PLATELET # BLD AUTO: 387 K/UL (ref 135–420)
PMV BLD AUTO: 10.3 FL (ref 9.2–11.8)
POTASSIUM SERPL-SCNC: 3.7 MMOL/L (ref 3.5–5.5)
RBC # BLD AUTO: 4.44 M/UL (ref 4.2–5.3)
SODIUM SERPL-SCNC: 138 MMOL/L (ref 136–145)
TROPONIN I SERPL-MCNC: <0.02 NG/ML (ref 0–0.06)
WBC # BLD AUTO: 10.9 K/UL (ref 4.6–13.2)

## 2017-10-30 PROCEDURE — 74011250636 HC RX REV CODE- 250/636: Performed by: EMERGENCY MEDICINE

## 2017-10-30 PROCEDURE — 96374 THER/PROPH/DIAG INJ IV PUSH: CPT

## 2017-10-30 PROCEDURE — 83690 ASSAY OF LIPASE: CPT | Performed by: EMERGENCY MEDICINE

## 2017-10-30 PROCEDURE — 71010 XR CHEST PORT: CPT

## 2017-10-30 PROCEDURE — 96361 HYDRATE IV INFUSION ADD-ON: CPT

## 2017-10-30 PROCEDURE — 99285 EMERGENCY DEPT VISIT HI MDM: CPT

## 2017-10-30 PROCEDURE — 85025 COMPLETE CBC W/AUTO DIFF WBC: CPT | Performed by: EMERGENCY MEDICINE

## 2017-10-30 PROCEDURE — 80048 BASIC METABOLIC PNL TOTAL CA: CPT | Performed by: EMERGENCY MEDICINE

## 2017-10-30 PROCEDURE — 93005 ELECTROCARDIOGRAM TRACING: CPT

## 2017-10-30 PROCEDURE — 85379 FIBRIN DEGRADATION QUANT: CPT | Performed by: EMERGENCY MEDICINE

## 2017-10-30 PROCEDURE — 82550 ASSAY OF CK (CPK): CPT | Performed by: EMERGENCY MEDICINE

## 2017-10-30 RX ORDER — KETOROLAC TROMETHAMINE 10 MG/1
10 TABLET, FILM COATED ORAL
Qty: 14 TAB | Refills: 0 | Status: SHIPPED | OUTPATIENT
Start: 2017-10-30 | End: 2018-04-22

## 2017-10-30 RX ORDER — HYDROCODONE BITARTRATE AND ACETAMINOPHEN 5; 325 MG/1; MG/1
1-2 TABLET ORAL
Qty: 16 TAB | Refills: 0 | Status: SHIPPED | OUTPATIENT
Start: 2017-10-30 | End: 2018-04-22

## 2017-10-30 RX ORDER — ONDANSETRON 4 MG/1
4 TABLET, ORALLY DISINTEGRATING ORAL
Qty: 14 TAB | Refills: 0 | Status: SHIPPED | OUTPATIENT
Start: 2017-10-30 | End: 2018-04-22

## 2017-10-30 RX ORDER — KETOROLAC TROMETHAMINE 30 MG/ML
30 INJECTION, SOLUTION INTRAMUSCULAR; INTRAVENOUS
Status: COMPLETED | OUTPATIENT
Start: 2017-10-30 | End: 2017-10-30

## 2017-10-30 RX ORDER — SODIUM CHLORIDE 9 MG/ML
1000 INJECTION, SOLUTION INTRAVENOUS ONCE
Status: COMPLETED | OUTPATIENT
Start: 2017-10-30 | End: 2017-10-30

## 2017-10-30 RX ADMIN — KETOROLAC TROMETHAMINE 30 MG: 30 INJECTION, SOLUTION INTRAMUSCULAR at 20:28

## 2017-10-30 RX ADMIN — SODIUM CHLORIDE 1000 ML: 900 INJECTION, SOLUTION INTRAVENOUS at 20:28

## 2017-10-30 NOTE — LETTER
NOTIFICATION RETURN TO WORK / SCHOOL 
 
10/30/2017 10:37 PM 
 
Ms. Marlen Tomas [address] [facility] [address] To Whom It May Concern: 
 
Marlen Tomas is currently under the care of [facility] [facility] EMERGENCY DEPT. She will return to work/school on: 11/1/17 If there are questions or concerns please have the patient contact our office.  
 
 
 
Sincerely, 
 
 
Gaby Pierson MD

## 2017-10-31 LAB
ATRIAL RATE: 76 BPM
CALCULATED P AXIS, ECG09: 48 DEGREES
CALCULATED R AXIS, ECG10: 10 DEGREES
CALCULATED T AXIS, ECG11: 15 DEGREES
DIAGNOSIS, 93000: NORMAL
P-R INTERVAL, ECG05: 204 MS
Q-T INTERVAL, ECG07: 418 MS
QRS DURATION, ECG06: 82 MS
QTC CALCULATION (BEZET), ECG08: 470 MS
VENTRICULAR RATE, ECG03: 76 BPM

## 2017-10-31 NOTE — DISCHARGE INSTRUCTIONS
Return for any new or worsening pain, fever, shortness of breath, vomiting, decreased fluid intake, weakness, numbness, dizziness, or any change or concerns. Chest Pain: Care Instructions  Your Care Instructions    There are many things that can cause chest pain. Some are not serious and will get better on their own in a few days. But some kinds of chest pain need more testing and treatment. Your doctor may have recommended a follow-up visit in the next 8 to 12 hours. If you are not getting better, you may need more tests or treatment. Even though your doctor has released you, you still need to watch for any problems. The doctor carefully checked you, but sometimes problems can develop later. If you have new symptoms or if your symptoms do not get better, get medical care right away. If you have worse or different chest pain or pressure that lasts more than 5 minutes or you passed out (lost consciousness), call 911 or seek other emergency help right away. A medical visit is only one step in your treatment. Even if you feel better, you still need to do what your doctor recommends, such as going to all suggested follow-up appointments and taking medicines exactly as directed. This will help you recover and help prevent future problems. How can you care for yourself at home? · Rest until you feel better. · Take your medicine exactly as prescribed. Call your doctor if you think you are having a problem with your medicine. · Do not drive after taking a prescription pain medicine. When should you call for help? Call 911 if:  ? · You passed out (lost consciousness). ? · You have severe difficulty breathing. ? · You have symptoms of a heart attack. These may include:  ¨ Chest pain or pressure, or a strange feeling in your chest.  ¨ Sweating. ¨ Shortness of breath. ¨ Nausea or vomiting.   ¨ Pain, pressure, or a strange feeling in your back, neck, jaw, or upper belly or in one or both shoulders or arms.  ¨ Lightheadedness or sudden weakness. ¨ A fast or irregular heartbeat. After you call 911, the  may tell you to chew 1 adult-strength or 2 to 4 low-dose aspirin. Wait for an ambulance. Do not try to drive yourself. ?Call your doctor today if:  ? · You have any trouble breathing. ? · Your chest pain gets worse. ? · You are dizzy or lightheaded, or you feel like you may faint. ? · You are not getting better as expected. ? · You are having new or different chest pain. Where can you learn more? Go to http://diane-claus.info/. Enter A120 in the search box to learn more about \"Chest Pain: Care Instructions. \"  Current as of: March 20, 2017  Content Version: 11.4  © 6704-4105 [employer]. Care instructions adapted under license by Natanael Ulien (which disclaims liability or warranty for this information). If you have questions about a medical condition or this instruction, always ask your healthcare professional. [place] [address] any warranty or liability for your use of this information.

## 2017-10-31 NOTE — ED PROVIDER NOTES
HPI Comments: 8:40 PM Dain Diaz is a 47 y.o. female with a h/o HTN, DM, Stroke, HYST presents to the ED with c/o constant left sided CP onset yesterday. Pt noted her pain to be 8/10. Pt reported SOB with exertion. Pain sharp. No h/o prior sx's. Pt denied back pain, n/v/d, fever, chills, or trauma. 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 All other sx denied. No other complaints at this time. Darlin Chavez MD      Patient is a 47 y.o. female presenting with chest pain and shortness of breath. The history is provided by the patient. Chest Pain (Angina)    Associated symptoms include shortness of breath. Pertinent negatives include no abdominal pain, no back pain, no cough, no fever, no nausea and no vomiting. Shortness of Breath   Associated symptoms include chest pain. Pertinent negatives include no fever, no cough, no vomiting, no abdominal pain and no rash. Past Medical History:   Diagnosis Date    Arthritis     Diabetes ([address]     Hypertension     Morbid obesity ([address]     BMI-50    Psychiatric disorder     CLAUSTROPHOBIA    Stroke ([facility] 2006    no residuals    Unspecified sleep apnea     uses cpap    Vitamin D deficiency        Past Surgical History:   Procedure Laterality Date    BREAST SURGERY PROCEDURE UNLISTED  1970'S    LEFT BREAST BX X2     COLONOSCOPY N/A 10/7/2016    COLONOSCOPY aborted poor prep performed by Montana Rich MD at [address] [place] [address] N/A 10/21/2016    COLONOSCOPY performed by Nellie Miguel MD at [facility]    HX HYSTERECTOMY  2006    HX KNEE ARTHROSCOPY  2005 AND 2012    RIGHT KNEE IN 2005, LEFT KNEE IN 2012    [address]      LT. br. 2016 benign fat necrosis         History reviewed. No pertinent family history.     Social History     Social History    Marital status:  Spouse name: N/A    Number of children: N/A    Years of education: N/A     Occupational History    Not on file. Social History Main Topics    Smoking status: Never Smoker    Smokeless tobacco: Never Used    Alcohol use No    Drug use: No    Sexual activity: Not on file     Other Topics Concern    Not on file     Social History Narrative         ALLERGIES: Review of patient's allergies indicates no known allergies. Review of Systems   Constitutional: Negative for fever. HENT: Negative for congestion. Respiratory: Positive for shortness of breath. Negative for cough. Cardiovascular: Positive for chest pain. Gastrointestinal: Negative for abdominal pain, diarrhea, nausea and vomiting. Musculoskeletal: Negative for back pain. Skin: Negative for rash. Neurological: Negative for light-headedness. All other systems reviewed and are negative. Vitals:    10/30/17 2130 10/30/17 2200 10/30/17 2202 10/30/17 2230   BP: 127/64 120/62  96/77   Pulse: 64 65 64 66   Resp: 17 16 (!) 7 17   Temp:       SpO2: 94% 91% 97% 98%   Weight:                Physical Exam   Constitutional: She is oriented to person, place, and time. She appears well-developed. HENT:   Head: Normocephalic. Mouth/Throat: Oropharynx is clear and moist.   Eyes: Pupils are equal, round, and reactive to light. Neck: Normal range of motion. Cardiovascular: Normal rate and normal heart sounds. No murmur heard. Pulmonary/Chest: Effort normal. She has no wheezes. She has no rales. Left sided chest wall tenderness. Abdominal: Soft. There is no tenderness. Musculoskeletal: Normal range of motion. She exhibits no edema. Neurological: She is alert and oriented to person, place, and time. Skin: Skin is warm and dry. Nursing note and vitals reviewed. [facility]  ED Course       Procedures    Vitals:  No data found.         Medications ordered:   Medications   ketorolac (TORADOL) injection 30 mg (30 mg IntraVENous Given 10/30/17 2028)   0.9% sodium chloride infusion 1,000 mL (0 mL IntraVENous IV Completed 10/30/17 2250)         Lab findings:  No results found for this or any previous visit (from the past 12 hour(s)). X-Ray, CT or other radiology findings or impressions:  XR CHEST PORT   Final Result      neg; ep interp    Progress notes, Consult notes or additional Procedure notes:   Af, nl vitals. Neg w/u for cad/pe/ptx/pna. No emc. Stable for dc and close f/u. Det ret inst given. Pt agrees w dc plan and verbalizes understanding of det ret inst given. Disposition:  Diagnosis:   1. Chest pain, unspecified type        Disposition:     Follow-up Information     Follow up With Details Comments Contact Info    Mabel Gleason MD Schedule an appointment as soon as possible for a visit in 1 day  [address] [place] [address]             Discharge Medication List as of 10/30/2017 10:38 PM      START taking these medications    Details   ketorolac (TORADOL) 10 mg tablet Take 1 Tab by mouth every eight (8) hours as needed for Pain., Print, Disp-14 Tab, R-0      HYDROcodone-acetaminophen (NORCO) 5-325 mg per tablet Take 1-2 Tabs by mouth every six (6) hours as needed for Pain. Max Daily Amount: 8 Tabs., Print, Disp-16 Tab, R-0         CONTINUE these medications which have CHANGED    Details   ondansetron (ZOFRAN ODT) 4 mg disintegrating tablet Take 1 Tab by mouth every eight (8) hours as needed for Nausea. , Print, Disp-14 Tab, R-0         CONTINUE these medications which have NOT CHANGED    Details   oxyCODONE-acetaminophen (PERCOCET) 5-325 mg per tablet Take 1 Tab by mouth every six (6) hours as needed for Pain. Max Daily Amount: 4 Tabs., Print, Disp-12 Tab, R-0      ergocalciferol (VITAMIN D2) 50,000 unit capsule Take 5,000 Units by mouth daily. , Historical Med      traMADol (ULTRAM) 50 mg tablet Take 1 Tab by mouth every six (6) hours as needed for Pain.  Max Daily Amount: 200 mg., Print, Disp-12 Tab, R-0      simvastatin (ZOCOR) 40 mg tablet Take 40 mg by mouth nightly., Historical Med      diclofenac EC (VOLTAREN) 75 mg EC tablet Take 75 mg by mouth two (2) times a day., Historical Med      metFORMIN (GLUCOPHAGE) 500 mg tablet Take 1,000 mg by mouth two (2) times daily (with meals). , Historical Med      gabapentin (NEURONTIN) 300 mg capsule Take 300 mg by mouth three (3) times daily. Indications: NEUROPATHIC PAIN, Historical Med      aspirin 81 mg chewable tablet Take 81 mg by mouth daily. , Historical Med      lisinopril (PRINIVIL, ZESTRIL) 30 mg tablet Take 30 mg by mouth two (2) times a day., Historical Med      diltiazem hcl 240 mg Tb24 Take 1 Tab by mouth two (2) times a day. Indications: HYPERTENSION, Historical Med      hydrochlorothiazide (HYDRODIURIL) 25 mg tablet Take 25 mg by mouth daily. Indications: HYPERTENSION, Historical Med      insulin glargine (LANTUS) 100 unit/mL injection 53 Units by SubCUTAneous route daily. Indications: DIABETES MELLITUS, Historical Med               Scribe [address] [place] [address] acting as a scribe for and in the presence of Gala Ivory MD      November 02, 2017 at 12:32 AM       Provider Attestation:      I personally performed the services described in the documentation, reviewed the documentation, as recorded by the scribe in my presence, and it accurately and completely records my words and actions.  November 02, 2017 at 12:32 AM - Gala Ivory MD

## 2018-02-05 ENCOUNTER — HOSPITAL ENCOUNTER (OUTPATIENT)
Dept: MAMMOGRAPHY | Age: 53
Discharge: HOME | End: 2018-02-05
Attending: INTERNAL MEDICINE
Payer: COMMERCIAL

## 2018-02-05 DIAGNOSIS — Z12.31 VISIT FOR SCREENING MAMMOGRAM: ICD-10-CM

## 2018-02-05 PROCEDURE — 77063 BREAST TOMOSYNTHESIS BI: CPT

## 2018-04-22 ENCOUNTER — APPOINTMENT (OUTPATIENT)
Dept: GENERAL RADIOLOGY | Age: 53
End: 2018-04-22
Attending: EMERGENCY MEDICINE
Payer: COMMERCIAL

## 2018-04-22 ENCOUNTER — HOSPITAL ENCOUNTER (EMERGENCY)
Age: 53
Discharge: HOME | End: 2018-04-22
Attending: EMERGENCY MEDICINE
Payer: COMMERCIAL

## 2018-04-22 VITALS
WEIGHT: 274 LBS | DIASTOLIC BLOOD PRESSURE: 79 MMHG | HEIGHT: 63 IN | TEMPERATURE: 98.1 F | RESPIRATION RATE: 20 BRPM | OXYGEN SATURATION: 99 % | BODY MASS INDEX: 48.55 KG/M2 | HEART RATE: 89 BPM | SYSTOLIC BLOOD PRESSURE: 130 MMHG

## 2018-04-22 DIAGNOSIS — S93.401A SPRAIN OF RIGHT ANKLE, UNSPECIFIED LIGAMENT, INITIAL ENCOUNTER: Primary | ICD-10-CM

## 2018-04-22 PROCEDURE — 73610 X-RAY EXAM OF ANKLE: CPT

## 2018-04-22 PROCEDURE — 99282 EMERGENCY DEPT VISIT SF MDM: CPT

## 2018-04-22 NOTE — ED TRIAGE NOTES
Pt works at nursing home. Got her right foot caught under a pt's walker 2 weeks ago. Now c/o right ankle pain.  Some swelling noted

## 2018-04-22 NOTE — DISCHARGE INSTRUCTIONS
Ankle Sprain: Care Instructions  Your Care Instructions    An ankle sprain can happen when you twist your ankle. The ligaments that support the ankle can get stretched and torn. Often the ankle is swollen and painful. Ankle sprains may take from several weeks to several months to heal. Usually, the more pain and swelling you have, the more severe your ankle sprain is and the longer it will take to heal. You can heal faster and regain strength in your ankle with good home treatment. It is very important to give your ankle time to heal completely, so that you do not easily hurt your ankle again. Follow-up care is a key part of your treatment and safety. Be sure to make and go to all appointments, and call your doctor if you are having problems. It's also a good idea to know your test results and keep a list of the medicines you take. How can you care for yourself at home? · Prop up your foot on pillows as much as possible for the next 3 days. Try to keep your ankle above the level of your heart. This will help reduce the swelling. · Follow your doctor's directions for wearing a splint or elastic bandage. Wrapping the ankle may help reduce or prevent swelling. · Your doctor may give you a splint, a brace, an air stirrup, or another form of ankle support to protect your ankle until it is healed. Wear it as directed while your ankle is healing. Do not remove it unless your doctor tells you to. After your ankle has healed, ask your doctor whether you should wear the brace when you exercise. · Put ice or cold packs on your injured ankle for 10 to 20 minutes at a time. Try to do this every 1 to 2 hours for the next 3 days (when you are awake) or until the swelling goes down. Put a thin cloth between the ice and your skin. · You may need to use crutches until you can walk without pain. If you do use crutches, try to bear some weight on your injured ankle if you can do so without pain.  This helps the ankle heal.  · Take pain medicines exactly as directed. ¨ If the doctor gave you a prescription medicine for pain, take it as prescribed. ¨ If you are not taking a prescription pain medicine, ask your doctor if you can take an over-the-counter medicine. · If you have been given ankle exercises to do at home, do them exactly as instructed. These can promote healing and help prevent lasting weakness. When should you call for help? Call your doctor now or seek immediate medical care if:  ? · Your pain is getting worse. ? · Your swelling is getting worse. ? · Your splint feels too tight or you are unable to loosen it. ? Watch closely for changes in your health, and be sure to contact your doctor if:  ? · You are not getting better after 1 week. Where can you learn more? Go to http://diane-claus.info/. Enter P201 in the search box to learn more about \"Ankle Sprain: Care Instructions. \"  Current as of: March 21, 2017  Content Version: 11.4  © 8371-3550 [employer]. Care instructions adapted under license by [employer] (which disclaims liability or warranty for this information). If you have questions about a medical condition or this instruction, always ask your healthcare professional. [place] [address] any warranty or liability for your use of this information. Ankle Sprain: Rehab Exercises  Your Care Instructions  Here are some examples of typical rehabilitation exercises for your condition. Start each exercise slowly. Ease off the exercise if you start to have pain. Your doctor or physical therapist will tell you when you can start these exercises and which ones will work best for you. How to do the exercises  \"Alphabet\" exercise    1. Trace the alphabet with your toe. This helps your ankle move in all directions. Side-to-side knee swing exercise    1. Sit in a chair with your foot flat on the floor.   2. Slowly move your knee from side to side. Keep your foot pressed flat. 3. Continue this exercise for 2 to 3 minutes. Towel curl    1. While sitting, place your foot on a towel on the floor. Scrunch the towel toward you with your toes. 2. Then use your toes to push the towel away from you. 3. To make this exercise more challenging you can put something on the other end of the towel. A can of soup is about the right weight for this. Towel stretch    1. Sit with your legs extended and knees straight. 2. Place a towel around your foot just under the toes. 3. Hold each end of the towel in each hand, with your hands above your knees. 4. Pull back with the towel so that your foot stretches toward you. 5. Hold the position for at least 15 to 30 seconds. 6. Repeat 2 to 4 times a session. Do up to 5 sessions a day. Ankle eversion exercise    1. Start by sitting with your foot flat on the floor. Push your foot outward against a wall or a piece of furniture that doesn't move. Hold for about 6 seconds, and relax. Repeat 8 to 12 times. 2. After you feel comfortable with this, try using rubber tubing looped around the outside of your feet for resistance. Push your foot out to the side against the tubing, and then count to 10 as you slowly bring your foot back to the middle. Repeat 8 to 12 times. Isometric opposition exercises    1. While sitting, put your feet together flat on the floor. 2. Press your injured foot inward against your other foot. Hold for about 6 seconds, and relax. Repeat 8 to 12 times. 3. Then place the heel of your other foot on top of the injured one. Push down with the top heel while trying to push up with your injured foot. Hold for about 6 seconds, and relax. Repeat 8 to 12 times. Resisted ankle inversion    1. Sit on the floor with your good leg crossed over your other leg. 2. Hold both ends of an exercise band and loop the band around the inside of your affected foot. Then press your other foot against the band.   3. Keeping your legs crossed, slowly push your affected foot against the band so that foot moves away from your other foot. Then slowly relax. 4. Repeat 8 to 12 times. Resisted ankle eversion    1. Sit on the floor with your legs straight. 2. Hold both ends of an exercise band and loop the band around the outside of your affected foot. Then press your other foot against the band. 3. Keeping your leg straight, slowly push your affected foot outward against the band and away from your other foot without letting your leg rotate. Then slowly relax. 4. Repeat 8 to 12 times. Resisted ankle dorsiflexion    1. Tie the ends of an exercise band together to form a loop. Attach one end of the loop to a secure object or shut a door on it to hold it in place. (Or you can have someone hold one end of the loop to provide resistance.)  2. While sitting on the floor or in a chair, loop the other end of the band over the top of your affected foot. 3. Keeping your knee and leg straight, slowly flex your foot to pull back on the exercise band, and then slowly relax. 4. Repeat 8 to 12 times. Single-leg balance    1. Stand on a flat surface with your arms stretched out to your sides like you are making the letter \"T. \" Then lift your good leg off the floor, bending it at the knee. If you are not steady on your feet, use one hand to hold on to a chair, counter, or wall. 2. Standing on the leg with your affected ankle, keep that knee straight. Try to balance on that leg for up to 30 seconds. Then rest for up to 10 seconds. 3. Repeat 6 to 8 times. 4. When you can balance on your affected leg for 30 seconds with your eyes open, try to balance on it with your eyes closed. 5. When you can do this exercise with your eyes closed for 30 seconds and with ease and no pain, try standing on a pillow or piece of foam, and repeat steps 1 through 4. Follow-up care is a key part of your treatment and safety.  Be sure to make and go to all appointments, and call your doctor if you are having problems. It's also a good idea to know your test results and keep a list of the medicines you take. Where can you learn more? Go to http://diane-claus.info/. Yony Church in the search box to learn more about \"Ankle Sprain: Rehab Exercises. \"  Current as of: March 21, 2017  Content Version: 11.4  © 0404-1935 [employer]. Care instructions adapted under license by [employer] (which disclaims liability or warranty for this information). If you have questions about a medical condition or this instruction, always ask your healthcare professional. [address] any warranty or liability for your use of this information.

## 2018-04-22 NOTE — ED PROVIDER NOTES
EMERGENCY DEPARTMENT HISTORY AND PHYSICAL EXAM    9:26 AM      Date: 4/22/2018  Patient Name: Fidencio Whitney    History of Presenting Illness     Chief Complaint   Patient presents with    Ankle Pain         History Provided By: Patient    Chief Complaint: Ankle Pain  Duration: 2 Weeks  Timing:  Constant  Location: Right  Quality: Aching  Severity: Moderate  Modifying Factors: s/p catching foot in pt walker at nursing home  Associated Symptoms: denies any other associated signs or symptoms      Additional History (Context): Fidencio Whitney is a 47 y.o. female with hx of HTN, DM, and stroke who presents with c/o constant moderate aching right ankle pain onset 2 weeks s/p injury. 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 Pt reports pain increased after working last night with increased ambulating. Denies any other associated sx. PCP: Mark Jules MD    Current Outpatient Prescriptions   Medication Sig Dispense Refill    simvastatin (ZOCOR) 40 mg tablet Take 40 mg by mouth nightly.  diclofenac EC (VOLTAREN) 75 mg EC tablet Take 75 mg by mouth two (2) times a day.  metFORMIN (GLUCOPHAGE) 500 mg tablet Take 1,000 mg by mouth two (2) times daily (with meals).  gabapentin (NEURONTIN) 300 mg capsule Take 300 mg by mouth three (3) times daily. Indications: NEUROPATHIC PAIN      aspirin 81 mg chewable tablet Take 81 mg by mouth daily.  lisinopril (PRINIVIL, ZESTRIL) 30 mg tablet Take 30 mg by mouth two (2) times a day.  diltiazem hcl 240 mg Tb24 Take 1 Tab by mouth two (2) times a day. Indications: HYPERTENSION      hydrochlorothiazide (HYDRODIURIL) 25 mg tablet Take 25 mg by mouth daily. Indications: HYPERTENSION      insulin glargine (LANTUS) 100 unit/mL injection 53 Units by SubCUTAneous route daily.  Indications: DIABETES MELLITUS         Past History     Past Medical History:  Past Medical History:   Diagnosis Date    Arthritis     [facility]     Hypertension     Morbid obesity ([facility]     BMI-50    Psychiatric disorder     CLAUSTROPHOBIA    Stroke ([facility] 2006    no residuals    Unspecified sleep apnea     uses cpap    Vitamin D deficiency        Past Surgical History:  Past Surgical History:   Procedure Laterality Date    BREAST SURGERY PROCEDURE UNLISTED  1970'S    LEFT BREAST BX X2     COLONOSCOPY N/A 10/7/2016    COLONOSCOPY aborted poor prep performed by Carol Damon MD at [address] COLONOSCOPY N/A 10/21/2016    COLONOSCOPY performed by Elkin Ramirez MD at [facility] END[facility]COPY    HX HYSTERECTOMY  2006    HX KNEE ARTHROSCOPY  2005 AND 2012    RIGHT KNEE IN 2005, LEFT KNEE IN 2012    [address]      LT[address] br. 2016 benign fat necrosis       Family History:  History reviewed. No pertinent family history. Social History:  Social History   Substance Use Topics    Smoking status: Never Smoker    Smokeless tobacco: Never Used    Alcohol use No       Allergies:  No Known Allergies      Review of Systems       Review of Systems   Constitutional: Negative for fever. Eyes: Negative for visual disturbance. Respiratory: Negative for shortness of breath. Cardiovascular: Negative for chest pain and leg swelling. Gastrointestinal: Negative for abdominal pain, blood in stool and vomiting. Genitourinary: Negative for dysuria and flank pain. Musculoskeletal: Negative for arthralgias and neck pain. Right ankle pain   Skin: Negative for rash. Neurological: Negative for headaches. Hematological: Does not bruise/bleed easily. Psychiatric/Behavioral: Negative for confusion. All other systems reviewed and are negative.         Physical Exam     Visit Vitals    /79 (BP 1 Location: Left arm)    Pulse 89    Temp 98.1 °F (36.7 °C)    Resp 20    Ht 5' 3\" (1.6 m)    Wt 124.3 kg (274 lb)    SpO2 99%    BMI 48.54 kg/m2         Physical Exam Constitutional: She is oriented to person, place, and time. She appears well-developed and well-nourished. No distress. HENT:   Head: Normocephalic and atraumatic. Right Ear: External ear normal.   Left Ear: External ear normal.   Nose: Nose normal.   Mouth/Throat: Oropharynx is clear and moist.   Eyes: Conjunctivae and EOM are normal. Pupils are equal, round, and reactive to light. No scleral icterus. Neck: Normal range of motion. Neck supple. No JVD present. No tracheal deviation present. No thyromegaly present. Cardiovascular: Normal rate, regular rhythm, normal heart sounds and intact distal pulses. Exam reveals no gallop and no friction rub. No murmur heard. Pulmonary/Chest: Effort normal and breath sounds normal. She exhibits no tenderness. Abdominal: Soft. Bowel sounds are normal. She exhibits no distension. There is no tenderness. There is no rebound and no guarding. Musculoskeletal: Normal range of motion. She exhibits no edema. Right ankle: She exhibits no ecchymosis. Tenderness (mild TTP bilaterally on right ankle). Lymphadenopathy:     She has no cervical adenopathy. Neurological: She is alert and oriented to person, place, and time. No cranial nerve deficit. Coordination normal.   No sensory loss, Gait normal, Motor 5/5   Skin: Skin is warm and dry. Psychiatric: She has a normal mood and affect. Her behavior is normal. Judgment and thought content normal.   Nursing note and vitals reviewed. Diagnostic Study Results     Labs -  No results found for this or any previous visit (from the past 12 hour(s)). Radiologic Studies -   XR ANKLE RT MIN 3 V   Final Result      ED Provider Interpretation - Normal   10:09 AM        Medical Decision Making   I am the first provider for this patient. I reviewed the vital signs, available nursing notes, past medical history, past surgical history, family history and social history.     Vital Signs-Reviewed the patient's vital signs.    Pulse Oximetry Analysis -  99% on room air (Interpretation) Normal    Cardiac Monitor:  Rate: 89 bpm  Rhythm:  Normal Sinus Rhythm       Records Reviewed: Nursing Notes (Time of Review: 9:30 AM)      Diagnosis     Clinical Impression:   1. Sprain of right ankle, unspecified ligament, initial encounter        Disposition: Discharge    Follow-up Information     Follow up With Details Comments [address] [facility] America Reich MD Call in 1 week For follow up if not feeling better in 1 week [address] [facility] [place]       [address] [facility] EMERGENCY DEPT Go to If symptoms worsen [address] [facility]  [phone]           Patient's Medications   Start Taking    No medications on file   Continue Taking    ASPIRIN 81 MG CHEWABLE TABLET    Take 81 mg by mouth daily. DICLOFENAC EC (VOLTAREN) 75 MG EC TABLET    Take 75 mg by mouth two (2) times a day. DILTIAZEM  MG TB24    Take 1 Tab by mouth two (2) times a day. Indications: HYPERTENSION    GABAPENTIN (NEURONTIN) 300 MG CAPSULE    Take 300 mg by mouth three (3) times daily. Indications: NEUROPATHIC PAIN    HYDROCHLOROTHIAZIDE (HYDRODIURIL) 25 MG TABLET    Take 25 mg by mouth daily. Indications: HYPERTENSION    INSULIN GLARGINE (LANTUS) 100 UNIT/ML INJECTION    53 Units by SubCUTAneous route daily. Indications: DIABETES MELLITUS    LISINOPRIL (PRINIVIL, ZESTRIL) 30 MG TABLET    Take 30 mg by mouth two (2) times a day. METFORMIN (GLUCOPHAGE) 500 MG TABLET    Take 1,000 mg by mouth two (2) times daily (with meals). SIMVASTATIN (ZOCOR) 40 MG TABLET    Take 40 mg by mouth nightly. These Medications have changed    No medications on file   Stop Taking    ERGOCALCIFEROL (VITAMIN D2) 50,000 UNIT CAPSULE    Take 5,000 Units by mouth daily. HYDROCODONE-ACETAMINOPHEN (NORCO) 5-325 MG PER TABLET    Take 1-2 Tabs by mouth every six (6) hours as needed for Pain. Max Daily Amount: 8 Tabs. KETOROLAC (TORADOL) 10 MG TABLET    Take 1 Tab by mouth every eight (8) hours as needed for Pain. ONDANSETRON (ZOFRAN ODT) 4 MG DISINTEGRATING TABLET    Take 1 Tab by mouth every eight (8) hours as needed for Nausea. OXYCODONE-ACETAMINOPHEN (PERCOCET) 5-325 MG PER TABLET    Take 1 Tab by mouth every six (6) hours as needed for Pain. Max Daily Amount: 4 Tabs. TRAMADOL (ULTRAM) 50 MG TABLET    Take 1 Tab by mouth every six (6) hours as needed for Pain. Max Daily Amount: 200 mg.     _______________________________    Attestations:  Scribe Attestation     Kaye Gonzalez acting as a scribe for and in the presence of Sabino Mcbride MD      April 22, 2018 at 9:30 AM       Provider Attestation:      I personally performed the services described in the documentation, reviewed the documentation, as recorded by the scribe in my presence, and it accurately and completely records my words and actions. April 22, 2018 at 9:30 AM - Sabino Mcbride MD    _______________________________  Results reviewd with pt, she agrees with dispo and F/U plan.   Sabino Mcbride MD  10:11 AM

## 2018-04-22 NOTE — LETTER
NOTIFICATION RETURN TO WORK  
 
4/22/2018 10:09 AM 
 
Ms. Rosalina Gilman [address] [place] [id number] To Whom It May Concern: 
 
Rosalina Gilman is currently under the care of [id number] [facility] EMERGENCY DEPT. She will return to work on: 4/24/2018 If there are questions or concerns please have the patient contact our office.  
 
 
 
Sincerely, 
 
 
 
 
 
Bard Priscila MD

## 2018-05-17 ENCOUNTER — HOSPITAL ENCOUNTER (OUTPATIENT)
Dept: LAB | Age: 53
Discharge: HOME | End: 2018-05-17
Payer: COMMERCIAL

## 2018-05-17 LAB
ALBUMIN SERPL-MCNC: 3.6 G/DL (ref 3.4–5)
ALBUMIN/GLOB SERPL: 0.9 {RATIO} (ref 0.8–1.7)
ALP SERPL-CCNC: 96 U/L (ref 45–117)
ALT SERPL-CCNC: 18 U/L (ref 13–56)
ANION GAP SERPL CALC-SCNC: 7 MMOL/L (ref 3–18)
AST SERPL-CCNC: 11 U/L (ref 15–37)
BASOPHILS # BLD: 0 K/UL (ref 0–0.1)
BASOPHILS NFR BLD: 0 % (ref 0–2)
BILIRUB SERPL-MCNC: 0.3 MG/DL (ref 0.2–1)
BUN SERPL-MCNC: 19 MG/DL (ref 7–18)
BUN/CREAT SERPL: 25 (ref 12–20)
CALCIUM SERPL-MCNC: 9.1 MG/DL (ref 8.5–10.1)
CHLORIDE SERPL-SCNC: 97 MMOL/L (ref 100–108)
CHOLEST SERPL-MCNC: 160 MG/DL
CO2 SERPL-SCNC: 31 MMOL/L (ref 21–32)
CREAT SERPL-MCNC: 0.76 MG/DL (ref 0.6–1.3)
CREAT UR-MCNC: 70.2 MG/DL (ref 30–125)
DIFFERENTIAL METHOD BLD: ABNORMAL
EOSINOPHIL # BLD: 0.2 K/UL (ref 0–0.4)
EOSINOPHIL NFR BLD: 2 % (ref 0–5)
ERYTHROCYTE [DISTWIDTH] IN BLOOD BY AUTOMATED COUNT: 15 % (ref 11.6–14.5)
EST. AVERAGE GLUCOSE BLD GHB EST-MCNC: 280 MG/DL
GLOBULIN SER CALC-MCNC: 3.9 G/DL (ref 2–4)
GLUCOSE SERPL-MCNC: 210 MG/DL (ref 74–99)
HBA1C MFR BLD: 11.4 % (ref 4.2–5.6)
HCT VFR BLD AUTO: 33.3 % (ref 35–45)
HDLC SERPL-MCNC: 62 MG/DL (ref 40–60)
HDLC SERPL: 2.6 {RATIO} (ref 0–5)
HGB BLD-MCNC: 11 G/DL (ref 12–16)
LDLC SERPL CALC-MCNC: 82.8 MG/DL (ref 0–100)
LIPID PROFILE,FLP: ABNORMAL
LYMPHOCYTES # BLD: 3.9 K/UL (ref 0.9–3.6)
LYMPHOCYTES NFR BLD: 42 % (ref 21–52)
MCH RBC QN AUTO: 24.8 PG (ref 24–34)
MCHC RBC AUTO-ENTMCNC: 33 G/DL (ref 31–37)
MCV RBC AUTO: 75.2 FL (ref 74–97)
MICROALBUMIN UR-MCNC: 0.71 MG/DL (ref 0–3)
MICROALBUMIN/CREAT UR-RTO: 10 MG/G (ref 0–30)
MONOCYTES # BLD: 0.6 K/UL (ref 0.05–1.2)
MONOCYTES NFR BLD: 7 % (ref 3–10)
NEUTS SEG # BLD: 4.6 K/UL (ref 1.8–8)
NEUTS SEG NFR BLD: 49 % (ref 40–73)
PLATELET # BLD AUTO: 407 K/UL (ref 135–420)
PMV BLD AUTO: 10.3 FL (ref 9.2–11.8)
POTASSIUM SERPL-SCNC: 4.2 MMOL/L (ref 3.5–5.5)
PROT SERPL-MCNC: 7.5 G/DL (ref 6.4–8.2)
RBC # BLD AUTO: 4.43 M/UL (ref 4.2–5.3)
SODIUM SERPL-SCNC: 135 MMOL/L (ref 136–145)
T4 SERPL-MCNC: 8.4 UG/DL (ref 4.7–13.3)
TRIGL SERPL-MCNC: 76 MG/DL (ref ?–150)
TSH SERPL DL<=0.05 MIU/L-ACNC: 0.67 UIU/ML (ref 0.36–3.74)
VLDLC SERPL CALC-MCNC: 15.2 MG/DL
WBC # BLD AUTO: 9.4 K/UL (ref 4.6–13.2)

## 2018-05-17 PROCEDURE — 83036 HEMOGLOBIN GLYCOSYLATED A1C: CPT | Performed by: INTERNAL MEDICINE

## 2018-05-17 PROCEDURE — 80061 LIPID PANEL: CPT | Performed by: INTERNAL MEDICINE

## 2018-05-17 PROCEDURE — 84436 ASSAY OF TOTAL THYROXINE: CPT | Performed by: INTERNAL MEDICINE

## 2018-05-17 PROCEDURE — 80053 COMPREHEN METABOLIC PANEL: CPT | Performed by: INTERNAL MEDICINE

## 2018-05-17 PROCEDURE — 36415 COLL VENOUS BLD VENIPUNCTURE: CPT | Performed by: INTERNAL MEDICINE

## 2018-05-17 PROCEDURE — 82043 UR ALBUMIN QUANTITATIVE: CPT | Performed by: INTERNAL MEDICINE

## 2018-05-17 PROCEDURE — 85025 COMPLETE CBC W/AUTO DIFF WBC: CPT | Performed by: INTERNAL MEDICINE

## 2018-11-28 ENCOUNTER — HOSPITAL ENCOUNTER (EMERGENCY)
Age: 53
Discharge: HOME | End: 2018-11-28
Attending: EMERGENCY MEDICINE
Payer: COMMERCIAL

## 2018-11-28 ENCOUNTER — APPOINTMENT (OUTPATIENT)
Dept: GENERAL RADIOLOGY | Age: 53
End: 2018-11-28
Attending: STUDENT IN AN ORGANIZED HEALTH CARE EDUCATION/TRAINING PROGRAM
Payer: COMMERCIAL

## 2018-11-28 VITALS
OXYGEN SATURATION: 100 % | DIASTOLIC BLOOD PRESSURE: 52 MMHG | RESPIRATION RATE: 12 BRPM | BODY MASS INDEX: 53.99 KG/M2 | WEIGHT: 275 LBS | SYSTOLIC BLOOD PRESSURE: 120 MMHG | HEIGHT: 60 IN | HEART RATE: 68 BPM | TEMPERATURE: 98 F

## 2018-11-28 DIAGNOSIS — R73.9 HYPERGLYCEMIA: Primary | ICD-10-CM

## 2018-11-28 LAB
ALBUMIN SERPL-MCNC: 3.3 G/DL (ref 3.4–5)
ALBUMIN/GLOB SERPL: 0.9 {RATIO} (ref 0.8–1.7)
ALP SERPL-CCNC: 84 U/L (ref 45–117)
ALT SERPL-CCNC: 15 U/L (ref 13–56)
ANION GAP SERPL CALC-SCNC: 9 MMOL/L (ref 3–18)
APPEARANCE UR: CLEAR
AST SERPL-CCNC: 10 U/L (ref 15–37)
BASOPHILS # BLD: 0 K/UL (ref 0–0.1)
BASOPHILS NFR BLD: 0 % (ref 0–2)
BILIRUB SERPL-MCNC: 0.2 MG/DL (ref 0.2–1)
BILIRUB UR QL: NEGATIVE
BUN SERPL-MCNC: 9 MG/DL (ref 7–18)
BUN/CREAT SERPL: 12 (ref 12–20)
CALCIUM SERPL-MCNC: 9 MG/DL (ref 8.5–10.1)
CHLORIDE SERPL-SCNC: 99 MMOL/L (ref 100–108)
CO2 SERPL-SCNC: 26 MMOL/L (ref 21–32)
COLOR UR: YELLOW
CREAT SERPL-MCNC: 0.75 MG/DL (ref 0.6–1.3)
DIFFERENTIAL METHOD BLD: ABNORMAL
EOSINOPHIL # BLD: 0 K/UL (ref 0–0.4)
EOSINOPHIL NFR BLD: 0 % (ref 0–5)
ERYTHROCYTE [DISTWIDTH] IN BLOOD BY AUTOMATED COUNT: 14.1 % (ref 11.6–14.5)
GLOBULIN SER CALC-MCNC: 3.8 G/DL (ref 2–4)
GLUCOSE BLD STRIP.AUTO-MCNC: 315 MG/DL (ref 70–110)
GLUCOSE SERPL-MCNC: 379 MG/DL (ref 74–99)
GLUCOSE UR STRIP.AUTO-MCNC: >1000 MG/DL
HCT VFR BLD AUTO: 41.1 % (ref 35–45)
HGB BLD-MCNC: 13.8 G/DL (ref 12–16)
HGB UR QL STRIP: NEGATIVE
KETONES UR QL STRIP.AUTO: 80 MG/DL
LEUKOCYTE ESTERASE UR QL STRIP.AUTO: NEGATIVE
LIPASE SERPL-CCNC: 42 U/L (ref 73–393)
LYMPHOCYTES # BLD: 2.2 K/UL (ref 0.9–3.6)
LYMPHOCYTES NFR BLD: 14 % (ref 21–52)
MCH RBC QN AUTO: 26 PG (ref 24–34)
MCHC RBC AUTO-ENTMCNC: 33.6 G/DL (ref 31–37)
MCV RBC AUTO: 77.4 FL (ref 74–97)
MONOCYTES # BLD: 0.9 K/UL (ref 0.05–1.2)
MONOCYTES NFR BLD: 5 % (ref 3–10)
NEUTS SEG # BLD: 12.9 K/UL (ref 1.8–8)
NEUTS SEG NFR BLD: 81 % (ref 40–73)
NITRITE UR QL STRIP.AUTO: NEGATIVE
PH UR STRIP: 7 [PH] (ref 5–8)
PLATELET # BLD AUTO: 375 K/UL (ref 135–420)
PMV BLD AUTO: 11.2 FL (ref 9.2–11.8)
POTASSIUM SERPL-SCNC: 3.6 MMOL/L (ref 3.5–5.5)
PROT SERPL-MCNC: 7.1 G/DL (ref 6.4–8.2)
PROT UR STRIP-MCNC: NEGATIVE MG/DL
RBC # BLD AUTO: 5.31 M/UL (ref 4.2–5.3)
SODIUM SERPL-SCNC: 134 MMOL/L (ref 136–145)
SP GR UR REFRACTOMETRY: >1.03 (ref 1–1.03)
UROBILINOGEN UR QL STRIP.AUTO: 0.2 EU/DL (ref 0.2–1)
WBC # BLD AUTO: 16.1 K/UL (ref 4.6–13.2)

## 2018-11-28 PROCEDURE — 71046 X-RAY EXAM CHEST 2 VIEWS: CPT

## 2018-11-28 PROCEDURE — 96360 HYDRATION IV INFUSION INIT: CPT

## 2018-11-28 PROCEDURE — 83690 ASSAY OF LIPASE: CPT

## 2018-11-28 PROCEDURE — 80053 COMPREHEN METABOLIC PANEL: CPT

## 2018-11-28 PROCEDURE — 99285 EMERGENCY DEPT VISIT HI MDM: CPT

## 2018-11-28 PROCEDURE — 82962 GLUCOSE BLOOD TEST: CPT

## 2018-11-28 PROCEDURE — 74011250636 HC RX REV CODE- 250/636: Performed by: STUDENT IN AN ORGANIZED HEALTH CARE EDUCATION/TRAINING PROGRAM

## 2018-11-28 PROCEDURE — 85025 COMPLETE CBC W/AUTO DIFF WBC: CPT

## 2018-11-28 PROCEDURE — 81003 URINALYSIS AUTO W/O SCOPE: CPT

## 2018-11-28 RX ADMIN — SODIUM CHLORIDE 1000 ML: 900 INJECTION, SOLUTION INTRAVENOUS at 19:25

## 2018-11-28 RX ADMIN — SODIUM CHLORIDE 1000 ML: 900 INJECTION, SOLUTION INTRAVENOUS at 19:26

## 2018-11-29 NOTE — ED NOTES
EMERGENCY DEPARTMENT HISTORY AND PHYSICAL EXAM 
 
7:51 PM 
 
 
Date: 11/28/2018 Patient Name: Oanh Wilson History of Presenting Illness Chief Complaint Patient presents with  
 High Blood Sugar History Provided By: Patient Chief Complaint: hyperglycemia, weakness Duration:  Hours Timing:  Acute and Gradual 
Location: N/A Quality: N/A Severity: N/A Modifying Factors: none Associated Symptoms: nausea, vomiting Additional History (Context): Oanh Wilson is a 47 y.o. female with diabetes, hypertension, hyperlipidemia and obesity who presents with high blood sugar, nausea, vomiting, loose bowel movement and overall not feeling well with weakness. 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 Initially she states she did not take it because she \"just did not\". She says that she normally does not skip lantus. After more discussion she said that she did not take it because she did not eat. She then states that she started feeling weak and vomited non-bloody, non-bilious vomit around 4pm. She also had one loose bowel movement. She then called her granddaughter who called her  who called EMS to bring here here. She endorses some mild abdominal pain that she states started after she was vomiting. She does states that she has had some weight loss over the last month that she has seen her PCM about but he did not seem concerned. She denies any urinary symptoms, sick contacts, cough, fevers, runny nose, constipation PCP: Yeni Stephenson MD 
 
Current Facility-Administered Medications Medication Dose Route Frequency Provider Last Rate Last Dose  sodium chloride 0.9 % bolus infusion 1,000 mL  1,000 mL IntraVENous ONCE Andrew Sun MD 1,000 mL/hr at 11/28/18 1925 1,000 mL at 11/28/18 1925  sodium chloride 0.9 % bolus infusion 1,000 mL  1,000 mL IntraVENous ONCE Grafton Blizzard, MD 1,000 mL/hr at 11/28/18 1926 1,000 mL at 11/28/18 1926 Current Outpatient Medications Medication Sig Dispense Refill  simvastatin (ZOCOR) 40 mg tablet Take 40 mg by mouth nightly.  diclofenac EC (VOLTAREN) 75 mg EC tablet Take 75 mg by mouth two (2) times a day.  metFORMIN (GLUCOPHAGE) 500 mg tablet Take 1,000 mg by mouth two (2) times daily (with meals).  gabapentin (NEURONTIN) 300 mg capsule Take 300 mg by mouth three (3) times daily. Indications: NEUROPATHIC PAIN    
 aspirin 81 mg chewable tablet Take 81 mg by mouth daily.  lisinopril (PRINIVIL, ZESTRIL) 30 mg tablet Take 30 mg by mouth two (2) times a day.  diltiazem hcl 240 mg Tb24 Take 1 Tab by mouth two (2) times a day. Indications: HYPERTENSION  hydrochlorothiazide (HYDRODIURIL) 25 mg tablet Take 25 mg by mouth daily. Indications: HYPERTENSION  insulin glargine (LANTUS) 100 unit/mL injection 53 Units by SubCUTAneous route daily. Indications: DIABETES MELLITUS Past History Past Medical History: 
Past Medical History:  
Diagnosis Date  Arthritis  Diabetes ([address]  Hypertension  Morbid obesity ([address] BMI-50  Psychiatric disorder CLAUSTROPHOBIA  Stroke [facility]) 2006  
 no residuals  Unspecified sleep apnea   
 uses cpap  Vitamin D deficiency Past Surgical History: 
Past Surgical History:  
Procedure Laterality Date  BREAST SURGERY PROCEDURE UNLISTED  1970'S  
 LEFT BREAST BX X2   
 HX HYSTERECTOMY  2006  HX KNEE ARTHROSCOPY  2005 AND 2012 RIGHT KNEE IN 2005, LEFT KNEE IN 2012  US GUIDED CORE BREAST BIOPSY    
 LT. br. 2016 benign fat necrosis Family History: 
History reviewed. No pertinent family history. Social History: 
Social History Tobacco Use  Smoking status: Never Smoker  Smokeless tobacco: Never Used Substance Use Topics  Alcohol use: No  
 Drug use: No  
 
 
Allergies: 
No Known Allergies Review of Systems Review of Systems Constitutional: Positive for fatigue. Negative for chills, diaphoresis and fever. HENT: Negative for congestion, sinus pressure, sinus pain and sore throat. Eyes: Negative for visual disturbance. Respiratory: Negative for chest tightness and wheezing. Cardiovascular: Negative for chest pain. Gastrointestinal: Positive for abdominal pain, nausea and vomiting. Negative for abdominal distention, constipation and diarrhea. Endocrine: Negative for polydipsia, polyphagia and polyuria. Genitourinary: Negative for dysuria, hematuria and urgency. Musculoskeletal: Negative for arthralgias and myalgias. Skin: Negative for color change. Neurological: Positive for weakness. Negative for dizziness, light-headedness and headaches. Physical Exam  
 
Visit Vitals /63 Pulse 70 Temp 98 °F (36.7 °C) Resp 15 SpO2 100% Physical Exam  
Constitutional: She is oriented to person, place, and time. She appears well-developed and well-nourished. No distress. HENT:  
Head: Normocephalic and atraumatic. Eyes: Conjunctivae and EOM are normal. Pupils are equal, round, and reactive to light. Neck: Normal range of motion. Neck supple. Cardiovascular: Normal rate, regular rhythm and normal heart sounds. Pulmonary/Chest: Effort normal and breath sounds normal. She exhibits no tenderness. Abdominal: Soft. Bowel sounds are normal. She exhibits no distension. There is tenderness. Mild epigastric tenderness Musculoskeletal: Normal range of motion. Neurological: She is alert and oriented to person, place, and time. Skin: Skin is warm and dry. She is not diaphoretic. Nursing note and vitals reviewed. Diagnostic Study Results Labs - Recent Results (from the past 12 hour(s)) CBC WITH AUTOMATED DIFF Collection Time: 11/28/18  6:25 PM  
Result Value Ref Range WBC 16.1 (H) 4.6 - 13.2 K/uL RBC 5.31 (H) 4.20 - 5.30 M/uL  
 HGB 13.8 12.0 - 16.0 g/dL HCT 41.1 35.0 - 45.0 % MCV 77.4 74.0 - 97.0 FL  
 MCH 26.0 24.0 - 34.0 PG  
 MCHC 33.6 31.0 - 37.0 g/dL  
 RDW 14.1 11.6 - 14.5 % PLATELET 322 211 - 746 K/uL MPV 11.2 9.2 - 11.8 FL  
 NEUTROPHILS 81 (H) 40 - 73 % LYMPHOCYTES 14 (L) 21 - 52 % MONOCYTES 5 3 - 10 % EOSINOPHILS 0 0 - 5 % BASOPHILS 0 0 - 2 %  
 ABS. NEUTROPHILS 12.9 (H) 1.8 - 8.0 K/UL  
 ABS. LYMPHOCYTES 2.2 0.9 - 3.6 K/UL  
 ABS. MONOCYTES 0.9 0.05 - 1.2 K/UL  
 ABS. EOSINOPHILS 0.0 0.0 - 0.4 K/UL  
 ABS. BASOPHILS 0.0 0.0 - 0.1 K/UL  
 DF AUTOMATED METABOLIC PANEL, COMPREHENSIVE Collection Time: 11/28/18  6:25 PM  
Result Value Ref Range Sodium 134 (L) 136 - 145 mmol/L Potassium 3.6 3.5 - 5.5 mmol/L Chloride 99 (L) 100 - 108 mmol/L  
 CO2 26 21 - 32 mmol/L Anion gap 9 3.0 - 18 mmol/L Glucose 379 (H) 74 - 99 mg/dL BUN 9 7.0 - 18 MG/DL Creatinine 0.75 0.6 - 1.3 MG/DL  
 BUN/Creatinine ratio 12 12 - 20 GFR est AA >60 >60 ml/min/1.73m2 GFR est non-AA >60 >60 ml/min/1.73m2 Calcium 9.0 8.5 - 10.1 MG/DL Bilirubin, total 0.2 0.2 - 1.0 MG/DL  
 ALT (SGPT) 15 13 - 56 U/L  
 AST (SGOT) 10 (L) 15 - 37 U/L Alk. phosphatase 84 45 - 117 U/L Protein, total 7.1 6.4 - 8.2 g/dL Albumin 3.3 (L) 3.4 - 5.0 g/dL Globulin 3.8 2.0 - 4.0 g/dL A-G Ratio 0.9 0.8 - 1.7 Radiologic Studies -  
XR CHEST PA LAT    (Results Pending) Medical Decision Making I am the first provider for this patient. I reviewed the vital signs, available nursing notes, past medical history, past surgical history, family history and social history. Vital Signs-Reviewed the patient's vital signs. Pulse Oximetry Analysis -  99 on room air Cardiac Monitor: 
Rate: 67 bpm 
Rhythm:  Normal sinus rhythm ED Course: Progress Notes, Reevaluation, and Consults Provider Notes (Medical Decision Making):  
 46 yo female with insulin dependent type 2 diabetes and hypertension presents to the ED with hyperglycemia, weakness, nausea and vomiting. Patient states that she has not been eating as much for the last month and has lost weight but has seen her PCM about this. Patient told me that she did not take her insulin because she \"just did not\" and then said it was because she did not eat but denies feeling unwell. She later told Dr. Roderick Zhou that she did not take it because she was feeling unwell. She told me that she checks her blood sugar three times a day but she later told her nurse that she does not check her blood sugar. Patient is overall proving to be a poor historian. On physical exam she has mild epigastric tenderness but her physical exam was otherwise insignificant. Differential: Hyperglycemia secondary to insulin noncompliance vs pancreatitis vs gallbladder pathology vs viral syndrome vs depression Will order CBC, CMP, UA, Chest x-ray. Will differ CT abdomen until results of other labs are back CBC demonstrated and elevated white count at 16.1. CMP was insignificant and did not demonstrate any anion gap. Patient did not meet Sepsis criteria. [place] [facility] [place] and she denies feeling depressed, loss of interest in activities, does have decreased appetite, denies change in sleep or stressful activities in life. Overall patient does not think she may be depressed and denies skipping insulin to try to harm herself. Blood glucose via EMS was 460 and after two liters of fluid her blood sugar was 318. Her urine did not demonstrate a UTI but did have glucose and 80 ketones. The ketones are most likely due to the fact that she has not eaten all day. It is not due from DKA since she has no anion gap. Her elevated white count can be attributed to her vomiting and high glucose levels. Do not think she has an intraabdominal infection.  Lipase was within normal limits. Do not think she needs a CT scan at this time due to lack of pain on exam when she is distracted by talking. Patient appeared much better after her blood sugar was lowered with fluids. Recommend that she follow up with her PCM in 1-2 days to make sure her diabetes is controlled and for insulin education. Also advised her to continue to talk with her PCM about her general unwell feeling. Discussed diagnosis, treatment plan, recommendations, follow up with patient. All questions answered and she was discharged home in stable condition. Diagnosis Clinical Impression: 1. Hyperglycemia Disposition: home Follow-up Information Follow up With Specialties Details Why Contact Info Юлия Rosales MD Internal Medicine Call in 2 days Please call you Primary care doctor to follow up on high blood sugar and general unwell feeling [address] [place] [id number] 
[phone] [facility] EMERGENCY DEPT Emergency Medicine  If symptoms worsen [id number] 
[phone] Medication List  
  
ASK your doctor about these medications   
aspirin 81 mg chewable tablet 
  
diclofenac EC 75 mg EC tablet Commonly known as:  VOLTAREN 
  
dilTIAZem  mg Tb24 tablet Commonly known as:  CARDIZEM LA 
  
gabapentin 300 mg capsule Commonly known as:  NEURONTIN 
  
GLUCOPHAGE 500 mg tablet Generic drug:  metFORMIN 
  
hydroCHLOROthiazide 25 mg tablet Commonly known as:  HYDRODIURIL 
  
LANTUS U-100 INSULIN 100 unit/mL injection Generic drug:  insulin glargine 
  
lisinopril 30 mg tablet Commonly known as:  PRINIVIL, ZESTRIL 
  
simvastatin 40 mg tablet Commonly known as:  John Valentina 
  
  
 
_______________________________ Provider Attestation:     
I personally performed the services described in the documentation, reviewed the documentation, as recorded by the scribe in my presence, and it accurately and completely records my words and actions. November 28, 2018 at 7:51 PM - Anna Colón MD   
_______________________________

## 2018-11-29 NOTE — ED NOTES
Assumed care of pt received bedside report from [facility], RN. Pt resting on bed, came to ED for hyperglycemia, states she has been feeling weak today and had some nausea, vomiting, diarrhea and abdominal pain, has not checked blood sugars today, states she takes 53 u of lantus every day and metformin

## 2018-11-29 NOTE — DISCHARGE INSTRUCTIONS
Learning About High Blood Sugar  What is high blood sugar? Your body turns the food you eat into glucose (sugar), which it uses for energy. But if your body isn't able to use the sugar right away, it can build up in your blood and lead to high blood sugar. When the amount of sugar in your blood stays too high for too much of the time, you may have diabetes. Diabetes is a disease that can cause serious health problems. The good news is that lifestyle changes may help you get your blood sugar back to normal and avoid or delay diabetes. What causes high blood sugar? Sugar (glucose) can build up in your blood if you:  · Are overweight. · Have a family history of diabetes. · Take certain medicines, such as steroids. What are the symptoms? Having high blood sugar may not cause any symptoms at all. Or it may make you feel very thirsty or very hungry. You may also urinate more often than usual, have blurry vision, or lose weight without trying. How is high blood sugar treated? You can take steps to lower your blood sugar level if you understand what makes it get higher. Your doctor may want you to learn how to test your blood sugar level at home. Then you can see how illness, stress, or different kinds of food or medicine raise or lower your blood sugar level. Other tests may be needed to see if you have diabetes. How can you prevent high blood sugar? · Watch your weight. If you're overweight, losing just a small amount of weight may help. Reducing fat around your waist is most important. · Limit the amount of calories, sweets, and unhealthy fat you eat. Ask your doctor if a dietitian can help you. A registered dietitian can help you create meal plans that fit your lifestyle. · Get at least 30 minutes of exercise on most days of the week. Exercise helps control your blood sugar. It also helps you maintain a healthy weight. Walking is a good choice.  You also may want to do other activities, such as running, swimming, cycling, or playing tennis or team sports. · If your doctor prescribed medicines, take them exactly as prescribed. Call your doctor if you think you are having a problem with your medicine. You will get more details on the specific medicines your doctor prescribes. Follow-up care is a key part of your treatment and safety. Be sure to make and go to all appointments, and call your doctor if you are having problems. It's also a good idea to know your test results and keep a list of the medicines you take. Where can you learn more? Go to http://diane-claus.info/. Enter O108 in the search box to learn more about \"Learning About High Blood Sugar. \"  Current as of: December 7, 2017  Content Version: 11.8  © 6916-9632 [employer]. Care instructions adapted under license by [employer] (which disclaims liability or warranty for this information). If you have questions about a medical condition or this instruction, always ask your healthcare professional. [address] any warranty or liability for your use of this information.

## 2019-02-13 ENCOUNTER — APPOINTMENT (OUTPATIENT)
Dept: GENERAL RADIOLOGY | Age: 54
End: 2019-02-13
Attending: EMERGENCY MEDICINE
Payer: COMMERCIAL

## 2019-02-13 ENCOUNTER — HOSPITAL ENCOUNTER (EMERGENCY)
Age: 54
Discharge: HOME | End: 2019-02-13
Attending: EMERGENCY MEDICINE
Payer: COMMERCIAL

## 2019-02-13 VITALS
WEIGHT: 246 LBS | DIASTOLIC BLOOD PRESSURE: 86 MMHG | BODY MASS INDEX: 45.27 KG/M2 | HEIGHT: 62 IN | SYSTOLIC BLOOD PRESSURE: 147 MMHG | RESPIRATION RATE: 18 BRPM | TEMPERATURE: 98.4 F | HEART RATE: 90 BPM | OXYGEN SATURATION: 100 %

## 2019-02-13 DIAGNOSIS — S93.401A SPRAIN OF RIGHT ANKLE, UNSPECIFIED LIGAMENT, INITIAL ENCOUNTER: Primary | ICD-10-CM

## 2019-02-13 PROCEDURE — 99283 EMERGENCY DEPT VISIT LOW MDM: CPT

## 2019-02-13 PROCEDURE — L4350 ANKLE CONTROL ORTHO PRE OTS: HCPCS

## 2019-02-13 PROCEDURE — 74011250637 HC RX REV CODE- 250/637: Performed by: EMERGENCY MEDICINE

## 2019-02-13 PROCEDURE — 73610 X-RAY EXAM OF ANKLE: CPT

## 2019-02-13 RX ORDER — CYCLOBENZAPRINE HCL 5 MG
10 TABLET ORAL
Qty: 9 TAB | Refills: 0 | Status: SHIPPED | OUTPATIENT
Start: 2019-02-13 | End: 2022-03-05

## 2019-02-13 RX ORDER — NAPROXEN 500 MG/1
500 TABLET ORAL 2 TIMES DAILY WITH MEALS
Qty: 20 TAB | Refills: 0 | Status: SHIPPED | OUTPATIENT
Start: 2019-02-13 | End: 2022-03-05

## 2019-02-13 RX ORDER — HYDROCODONE BITARTRATE AND ACETAMINOPHEN 5; 325 MG/1; MG/1
1 TABLET ORAL
Status: COMPLETED | OUTPATIENT
Start: 2019-02-13 | End: 2019-02-13

## 2019-02-13 RX ADMIN — HYDROCODONE BITARTRATE AND ACETAMINOPHEN 1 TABLET: 5; 325 TABLET ORAL at 18:35

## 2019-02-13 NOTE — DISCHARGE INSTRUCTIONS
Patient Education        Ankle Sprain: Care Instructions  Your Care Instructions    An ankle sprain can happen when you twist your ankle. The ligaments that support the ankle can get stretched and torn. Often the ankle is swollen and painful. Ankle sprains may take from several weeks to several months to heal. Usually, the more pain and swelling you have, the more severe your ankle sprain is and the longer it will take to heal. You can heal faster and regain strength in your ankle with good home treatment. It is very important to give your ankle time to heal completely, so that you do not easily hurt your ankle again. Follow-up care is a key part of your treatment and safety. Be sure to make and go to all appointments, and call your doctor if you are having problems. It's also a good idea to know your test results and keep a list of the medicines you take. How can you care for yourself at home? · Prop up your foot on pillows as much as possible for the next 3 days. Try to keep your ankle above the level of your heart. This will help reduce the swelling. · Follow your doctor's directions for wearing a splint or elastic bandage. Wrapping the ankle may help reduce or prevent swelling. · Your doctor may give you a splint, a brace, an air stirrup, or another form of ankle support to protect your ankle until it is healed. Wear it as directed while your ankle is healing. Do not remove it unless your doctor tells you to. After your ankle has healed, ask your doctor whether you should wear the brace when you exercise. · Put ice or cold packs on your injured ankle for 10 to 20 minutes at a time. Try to do this every 1 to 2 hours for the next 3 days (when you are awake) or until the swelling goes down. Put a thin cloth between the ice and your skin. · You may need to use crutches until you can walk without pain. If you do use crutches, try to bear some weight on your injured ankle if you can do so without pain.  This helps the ankle heal.  · Take pain medicines exactly as directed. ? If the doctor gave you a prescription medicine for pain, take it as prescribed. ? If you are not taking a prescription pain medicine, ask your doctor if you can take an over-the-counter medicine. · If you have been given ankle exercises to do at home, do them exactly as instructed. These can promote healing and help prevent lasting weakness. When should you call for help? Call your doctor now or seek immediate medical care if:    · Your pain is getting worse.     · Your swelling is getting worse.     · Your splint feels too tight or you are unable to loosen it.    Watch closely for changes in your health, and be sure to contact your doctor if:    · You are not getting better after 1 week. Where can you learn more? Go to http://diane-claus.info/. Enter P292 in the search box to learn more about \"Ankle Sprain: Care Instructions. \"  Current as of: September 20, 2018  Content Version: 11.9  © 2948-5758 [employer]. Care instructions adapted under license by [employer] (which disclaims liability or warranty for this information). If you have questions about a medical condition or this instruction, always ask your healthcare professional. [place] [address] any warranty or liability for your use of this information. Patient Education        Ankle Sprain: Rehab Exercises  Your Care Instructions  Here are some examples of typical rehabilitation exercises for your condition. Start each exercise slowly. Ease off the exercise if you start to have pain. Your doctor or physical therapist will tell you when you can start these exercises and which ones will work best for you. How to do the exercises  \"Alphabet\" exercise    1. Trace the alphabet with your toe. This helps your ankle move in all directions. Side-to-side knee swing exercise    1.  Sit in a chair with your foot flat on the floor.  2. Slowly move your knee from side to side. Keep your foot pressed flat. 3. Continue this exercise for 2 to 3 minutes. Towel curl    1. While sitting, place your foot on a towel on the floor. Scrunch the towel toward you with your toes. 2. Then use your toes to push the towel away from you. 3. To make this exercise more challenging you can put something on the other end of the towel. A can of soup is about the right weight for this. Towel stretch    1. Sit with your legs extended and knees straight. 2. Place a towel around your foot just under the toes. 3. Hold each end of the towel in each hand, with your hands above your knees. 4. Pull back with the towel so that your foot stretches toward you. 5. Hold the position for at least 15 to 30 seconds. 6. Repeat 2 to 4 times a session. Do up to 5 sessions a day. Ankle eversion exercise    1. Start by sitting with your foot flat on the floor. Push your foot outward against a wall or a piece of furniture that doesn't move. Hold for about 6 seconds, and relax. Repeat 8 to 12 times. 2. After you feel comfortable with this, try using rubber tubing looped around the outside of your feet for resistance. Push your foot out to the side against the tubing, and then count to 10 as you slowly bring your foot back to the middle. Repeat 8 to 12 times. Isometric opposition exercises    1. While sitting, put your feet together flat on the floor. 2. Press your injured foot inward against your other foot. Hold for about 6 seconds, and relax. Repeat 8 to 12 times. 3. Then place the heel of your other foot on top of the injured one. Push down with the top heel while trying to push up with your injured foot. Hold for about 6 seconds, and relax. Repeat 8 to 12 times. Resisted ankle inversion    1. Sit on the floor with your good leg crossed over your other leg. 2. Hold both ends of an exercise band and loop the band around the inside of your affected foot. Then press your other foot against the band. 3. Keeping your legs crossed, slowly push your affected foot against the band so that foot moves away from your other foot. Then slowly relax. 4. Repeat 8 to 12 times. Resisted ankle eversion    1. Sit on the floor with your legs straight. 2. Hold both ends of an exercise band and loop the band around the outside of your affected foot. Then press your other foot against the band. 3. Keeping your leg straight, slowly push your affected foot outward against the band and away from your other foot without letting your leg rotate. Then slowly relax. 4. Repeat 8 to 12 times. Resisted ankle dorsiflexion    1. Tie the ends of an exercise band together to form a loop. Attach one end of the loop to a secure object or shut a door on it to hold it in place. (Or you can have someone hold one end of the loop to provide resistance.)  2. While sitting on the floor or in a chair, loop the other end of the band over the top of your affected foot. 3. Keeping your knee and leg straight, slowly flex your foot to pull back on the exercise band, and then slowly relax. 4. Repeat 8 to 12 times. Single-leg balance    1. Stand on a flat surface with your arms stretched out to your sides like you are making the letter \"T. \" Then lift your good leg off the floor, bending it at the knee. If you are not steady on your feet, use one hand to hold on to a chair, counter, or wall. 2. Standing on the leg with your affected ankle, keep that knee straight. Try to balance on that leg for up to 30 seconds. Then rest for up to 10 seconds. 3. Repeat 6 to 8 times. 4. When you can balance on your affected leg for 30 seconds with your eyes open, try to balance on it with your eyes closed. 5. When you can do this exercise with your eyes closed for 30 seconds and with ease and no pain, try standing on a pillow or piece of foam, and repeat steps 1 through 4.     Follow-up care is a key part of your treatment and safety. Be sure to make and go to all appointments, and call your doctor if you are having problems. It's also a good idea to know your test results and keep a list of the medicines you take. Where can you learn more? Go to http://diane-claus.info/. Eryn Burns in the search box to learn more about \"Ankle Sprain: Rehab Exercises. \"  Current as of: September 20, 2018  Content Version: 11.9  © 8671-1506 [employer]. Care instructions adapted under license by [employer] (which disclaims liability or warranty for this information). If you have questions about a medical condition or this instruction, always ask your healthcare professional. [address] any warranty or liability for your use of this information.

## 2019-02-13 NOTE — ED TRIAGE NOTES
Pt. States  \"I\"m having pain in my right foot and in my calf\" observed swelling to foot. Reports symptoms became worse over the last 2 days. Denies any SOB.

## 2019-02-13 NOTE — ED PROVIDER NOTES
EMERGENCY DEPARTMENT HISTORY AND PHYSICAL EXAM 
 
5:24 PM 
 
 
Date: 2/13/2019 Patient Name: Logan Díaz History of Presenting Illness Chief Complaint Patient presents with  Foot Pain  
  right  Leg Pain  
  right calf History Provided By: Patient Additional History (Context): Logan Díaz is a 47 y.o. female with diabetes, hypertension and stroke who presents with worsening, moderate, aching right foot pain and swelling onset 2 days ago. Associated symptoms include right calf. Pt states she injured her foot in August 2018. Her pain is exacerbated with movement. She has taken OTC arthritis medications with no relief. No other concerns or symptoms at this time. PCP: David Phelps MD 
 
 
Chief Complaint: Foot pain Duration: 2 Days Timing:  Worsening Location: Right foot Quality: Aching Severity: Moderate Modifying Factors: exacerbated with movement, OTC arthritis medications with no relief Associated Symptoms: right foot swelling, right calf pain Current Facility-Administered Medications Medication Dose Route Frequency Provider Last Rate Last Dose  
 HYDROcodone-acetaminophen (NORCO) 5-325 mg per tablet 1 Tab  1 Tab Oral NOW Gema Camarillo, DO      
 
Current Outpatient Medications Medication Sig Dispense Refill  cyclobenzaprine (FLEXERIL) 5 mg tablet Take 2 Tabs by mouth three (3) times daily (with meals). 9 Tab 0  
 naproxen (NAPROSYN) 500 mg tablet Take 1 Tab by mouth two (2) times daily (with meals). 20 Tab 0  
 simvastatin (ZOCOR) 40 mg tablet Take 40 mg by mouth nightly.  diclofenac EC (VOLTAREN) 75 mg EC tablet Take 75 mg by mouth two (2) times a day.  metFORMIN (GLUCOPHAGE) 500 mg tablet Take 1,000 mg by mouth two (2) times daily (with meals).  gabapentin (NEURONTIN) 300 mg capsule Take 300 mg by mouth three (3) times daily. Indications: NEUROPATHIC PAIN    
 aspirin 81 mg chewable tablet Take 81 mg by mouth daily.  lisinopril (PRINIVIL, ZESTRIL) 30 mg tablet Take 30 mg by mouth two (2) times a day.  diltiazem hcl 240 mg Tb24 Take 1 Tab by mouth two (2) times a day. Indications: HYPERTENSION  hydrochlorothiazide (HYDRODIURIL) 25 mg tablet Take 25 mg by mouth daily. Indications: HYPERTENSION  insulin glargine (LANTUS) 100 unit/mL injection 53 Units by SubCUTAneous route daily. Indications: DIABETES MELLITUS Past History Past Medical History: 
Past Medical History:  
Diagnosis Date  Arthritis  Diabetes ([facility]  Hypertension  Morbid obesity ([facility] BMI-50  Psychiatric disorder CLAUSTROPHOBIA  Stroke [facility]) 2006  
 no residuals  Unspecified sleep apnea   
 uses cpap  Vitamin D deficiency Past Surgical History: 
Past Surgical History:  
Procedure Laterality Date  BREAST SURGERY PROCEDURE UNLISTED  1970'S  
 LEFT BREAST BX X2   
 COLONOSCOPY N/A 10/7/2016 COLONOSCOPY aborted poor prep performed by Jeff Grant MD at [facility]COPY  COLONOSCOPY N/A 10/21/2016 COLONOSCOPY performed by Ahsan Elias MD at [place] [address]  2006  HX KNEE ARTHROSCOPY  2005 AND 2012 RIGHT KNEE IN 2005, LEFT KNEE IN 2012  US GUIDED CORE BREAST BIOPSY    
 LT. br. 2016 benign fat necrosis Family History: No family history on file. Social History: 
Social History Tobacco Use  Smoking status: Never Smoker  Smokeless tobacco: Never Used Substance Use Topics  Alcohol use: No  
 Drug use: No  
 
 
Allergies: 
No Known Allergies Review of Systems Review of Systems Constitutional: Negative. Negative for chills, diaphoresis and fever. HENT: Negative. Negative for congestion, rhinorrhea and sore throat. Eyes: Negative. Negative for pain, discharge and redness. Respiratory: Negative. Negative for cough, chest tightness, shortness of breath and wheezing. Cardiovascular: Negative. Negative for chest pain. Gastrointestinal: Negative. Negative for abdominal pain, constipation, diarrhea, nausea and vomiting. Genitourinary: Negative. Negative for dysuria, flank pain, frequency, hematuria and urgency. Musculoskeletal: Negative. Negative for back pain and neck pain. Positive for right foot pain and swelling. Positive for right calf pain. Skin: Negative. Negative for rash. Neurological: Negative. Negative for syncope, weakness, numbness and headaches. Psychiatric/Behavioral: Negative. All other systems reviewed and are negative. Physical Exam  
 
Visit Vitals /86 (BP 1 Location: Left arm, BP Patient Position: Sitting) Pulse 90 Temp 98.4 °F (36.9 °C) Resp 18 Ht 5' 2\" (1.575 m) Wt 111.6 kg (246 lb) SpO2 100% BMI 44.99 kg/m² Physical Exam  
Constitutional: She appears well-developed and well-nourished. Non-toxic appearance. She does not have a sickly appearance. She does not appear ill. No distress. HENT:  
Head: Normocephalic and atraumatic. Mouth/Throat: Oropharynx is clear and moist. No oropharyngeal exudate. Eyes: Conjunctivae and EOM are normal. Pupils are equal, round, and reactive to light. No scleral icterus. Neck: Normal range of motion. Neck supple. No hepatojugular reflux and no JVD present. No tracheal deviation present. No thyromegaly present. Cardiovascular: Normal rate, regular rhythm, S1 normal, S2 normal, normal heart sounds, intact distal pulses and normal pulses. Exam reveals no gallop, no S3 and no S4. No murmur heard. Pulses: 
     Radial pulses are 2+ on the right side, and 2+ on the left side. Dorsalis pedis pulses are 2+ on the right side, and 2+ on the left side. Pulmonary/Chest: Effort normal and breath sounds normal. No respiratory distress. She has no decreased breath sounds. She has no wheezes. She has no rhonchi. She has no rales. Abdominal: Soft. Normal appearance and bowel sounds are normal. She exhibits no distension and no mass. There is no hepatosplenomegaly. There is no tenderness. There is no rigidity, no rebound, no guarding, no CVA tenderness, no tenderness at McBurney's point and negative Cordero's sign. Musculoskeletal:  
     Right ankle: She exhibits decreased range of motion and swelling. She exhibits no ecchymosis, no deformity, no laceration and normal pulse. Tenderness. Lateral malleolus tenderness found. No medial malleolus, no AITFL, no CF ligament, no posterior TFL, no head of 5th metatarsal and no proximal fibula tenderness found. Achilles tendon normal.  
     Feet: 
 
Lymphadenopathy:  
     Head (right side): No submental, no submandibular, no preauricular and no occipital adenopathy present. Head (left side): No submental, no submandibular, no preauricular and no occipital adenopathy present. She has no cervical adenopathy. Right: No supraclavicular adenopathy present. Left: No supraclavicular adenopathy present. Neurological: She is alert. She has normal strength and normal reflexes. She is not disoriented. No cranial nerve deficit or sensory deficit. Coordination and gait normal. GCS eye subscore is 4. GCS verbal subscore is 5. GCS motor subscore is 6. Grossly intact Skin: Skin is warm, dry and intact. No rash noted. She is not diaphoretic. Psychiatric: She has a normal mood and affect. Her speech is normal and behavior is normal. Judgment and thought content normal. Cognition and memory are normal.  
Nursing note and vitals reviewed. Diagnostic Study Results Labs - No results found for this or any previous visit (from the past 12 hour(s)). Radiologic Studies -  
XR ANKLE RT MIN 3 V    (Results Pending) Medical Decision Making Provider Notes (Medical Decision Making): MDM Number of Diagnoses or Management Options Sprain of right ankle, unspecified ligament, initial encounter:  
Diagnosis management comments: Ankle sprain I am the first provider for this patient. I reviewed the vital signs, available nursing notes, past medical history, past surgical history, family history and social history. Vital Signs-Reviewed the patient's vital signs. Records Reviewed: Nursing Notes and Old Medical Records (Time of Review: 5:24 PM) 
 
ED Course: Progress Notes, Reevaluation, and Consults Right ankle X-Ray showed No acute process. 5:24 PM 2/13/2019 Diagnosis I have reassessed the patient. Patient is feeling better. Patient will be prescribed flexeril and naprosyn. Patient was discharged in stable condition. Patient is to return to emergency department if any new or worsening condition. Clinical Impression: 1. Sprain of right ankle, unspecified ligament, initial encounter Disposition: Discharge Follow-up Information Follow up With Specialties Details Why Contact Info Nona Pulido MD Internal Medicine Schedule an appointment as soon as possible for a visit in 3 days For Follow-up [address] [place] [id number] 
[phone] [facility] EMERGENCY DEPT Emergency Medicine Go to If symptoms worsen, As needed [address] [facility] 
[phone]  
  
  
 
_______________________________ Attestations: 
Scribe Attestation Harmeet Quiroz acting as a scribe for and in the presence of [address] [place], [place] [address]Marcin DO February 13, 2019 at 6:25 PM 
    
Provider Attestation:     
I personally performed the services described in the documentation, reviewed the documentation, as recorded by the scribe in my presence, and it accurately and completely records my words and actions. February 13, 2019 at 6:25 PM - Marcin Camarillo DO   
_______________________________

## 2019-02-13 NOTE — LETTER
[address] [place] [address] [facility] EMERGENCY DEPT 
[address] [facility] [address] 
[phone] Work/School Note Date: 2/13/2019 To Whom It May concern: 
 
Mariano Campbell was seen and treated today in the emergency room by the following provider(s): 
Attending Provider: Jeaneth Browning DO. Mariano Campbell {Return to work on 2/16/2019 Sincerely, Kenyetta Hadley RN

## 2019-03-21 ENCOUNTER — HOSPITAL ENCOUNTER (OUTPATIENT)
Dept: MAMMOGRAPHY | Age: 54
Discharge: HOME | End: 2019-03-21
Attending: INTERNAL MEDICINE
Payer: COMMERCIAL

## 2019-03-21 DIAGNOSIS — Z12.31 VISIT FOR SCREENING MAMMOGRAM: ICD-10-CM

## 2019-03-21 PROCEDURE — 77063 BREAST TOMOSYNTHESIS BI: CPT

## 2021-05-21 ENCOUNTER — TRANSCRIBE ORDER (OUTPATIENT)
Dept: SCHEDULING | Age: 56
End: 2021-05-21

## 2021-05-21 DIAGNOSIS — Z12.31 VISIT FOR SCREENING MAMMOGRAM: Primary | ICD-10-CM

## 2021-05-29 ENCOUNTER — HOSPITAL ENCOUNTER (OUTPATIENT)
Dept: LAB | Age: 56
Discharge: HOME | End: 2021-05-29
Payer: COMMERCIAL

## 2021-05-29 LAB
25(OH)D3 SERPL-MCNC: 26.1 NG/ML (ref 30–100)
BASOPHILS # BLD: 0.1 K/UL (ref 0–0.1)
BASOPHILS NFR BLD: 1 % (ref 0–2)
CHOLEST SERPL-MCNC: 164 MG/DL
CREAT UR-MCNC: 124 MG/DL (ref 30–125)
DIFFERENTIAL METHOD BLD: ABNORMAL
EOSINOPHIL # BLD: 0.1 K/UL (ref 0–0.4)
EOSINOPHIL NFR BLD: 2 % (ref 0–5)
ERYTHROCYTE [DISTWIDTH] IN BLOOD BY AUTOMATED COUNT: 13.3 % (ref 11.6–14.5)
HCT VFR BLD AUTO: 36.4 % (ref 35–45)
HDLC SERPL-MCNC: 68 MG/DL (ref 40–60)
HDLC SERPL: 2.4 {RATIO} (ref 0–5)
HGB BLD-MCNC: 11.7 G/DL (ref 12–16)
LDLC SERPL CALC-MCNC: 84.8 MG/DL (ref 0–100)
LIPID PROFILE,FLP: ABNORMAL
LYMPHOCYTES # BLD: 2.7 K/UL (ref 0.9–3.6)
LYMPHOCYTES NFR BLD: 43 % (ref 21–52)
MCH RBC QN AUTO: 26.2 PG (ref 24–34)
MCHC RBC AUTO-ENTMCNC: 32.1 G/DL (ref 31–37)
MCV RBC AUTO: 81.6 FL (ref 74–97)
MICROALBUMIN UR-MCNC: 2.13 MG/DL (ref 0–3)
MICROALBUMIN/CREAT UR-RTO: 17 MG/G (ref 0–30)
MONOCYTES # BLD: 0.4 K/UL (ref 0.05–1.2)
MONOCYTES NFR BLD: 7 % (ref 3–10)
NEUTS SEG # BLD: 3 K/UL (ref 1.8–8)
NEUTS SEG NFR BLD: 47 % (ref 40–73)
PLATELET # BLD AUTO: 347 K/UL (ref 135–420)
PMV BLD AUTO: 11 FL (ref 9.2–11.8)
RBC # BLD AUTO: 4.46 M/UL (ref 4.2–5.3)
TRIGL SERPL-MCNC: 56 MG/DL (ref ?–150)
VLDLC SERPL CALC-MCNC: 11.2 MG/DL
WBC # BLD AUTO: 6.4 K/UL (ref 4.6–13.2)

## 2021-05-29 PROCEDURE — 82306 VITAMIN D 25 HYDROXY: CPT

## 2021-05-29 PROCEDURE — 80061 LIPID PANEL: CPT

## 2021-05-29 PROCEDURE — 82043 UR ALBUMIN QUANTITATIVE: CPT

## 2021-05-29 PROCEDURE — 85025 COMPLETE CBC W/AUTO DIFF WBC: CPT

## 2021-05-29 PROCEDURE — 36415 COLL VENOUS BLD VENIPUNCTURE: CPT

## 2021-10-20 ENCOUNTER — TRANSCRIBE ORDER (OUTPATIENT)
Dept: SCHEDULING | Age: 56
End: 2021-10-20

## 2021-10-20 DIAGNOSIS — Z12.31 VISIT FOR SCREENING MAMMOGRAM: Primary | ICD-10-CM

## 2022-02-04 ENCOUNTER — HOSPITAL ENCOUNTER (OUTPATIENT)
Dept: LAB | Age: 57
Discharge: HOME | End: 2022-02-04
Payer: COMMERCIAL

## 2022-02-04 LAB
25(OH)D3 SERPL-MCNC: 24.5 NG/ML (ref 30–100)
ALBUMIN SERPL-MCNC: 3.5 G/DL (ref 3.4–5)
ALBUMIN/GLOB SERPL: 1 {RATIO} (ref 0.8–1.7)
ALP SERPL-CCNC: 71 U/L (ref 45–117)
ALT SERPL-CCNC: 18 U/L (ref 13–56)
ANION GAP SERPL CALC-SCNC: 4 MMOL/L (ref 3–18)
AST SERPL-CCNC: 11 U/L (ref 10–38)
BASOPHILS # BLD: 0.1 K/UL (ref 0–0.1)
BASOPHILS NFR BLD: 1 % (ref 0–2)
BILIRUB SERPL-MCNC: 0.4 MG/DL (ref 0.2–1)
BUN SERPL-MCNC: 12 MG/DL (ref 7–18)
BUN/CREAT SERPL: 19 (ref 12–20)
CALCIUM SERPL-MCNC: 9.8 MG/DL (ref 8.5–10.1)
CHLORIDE SERPL-SCNC: 101 MMOL/L (ref 100–111)
CHOLEST SERPL-MCNC: 177 MG/DL
CO2 SERPL-SCNC: 32 MMOL/L (ref 21–32)
CREAT SERPL-MCNC: 0.64 MG/DL (ref 0.6–1.3)
CREAT UR-MCNC: 66 MG/DL (ref 30–125)
DIFFERENTIAL METHOD BLD: ABNORMAL
EOSINOPHIL # BLD: 0.2 K/UL (ref 0–0.4)
EOSINOPHIL NFR BLD: 3 % (ref 0–5)
ERYTHROCYTE [DISTWIDTH] IN BLOOD BY AUTOMATED COUNT: 13.6 % (ref 11.6–14.5)
EST. AVERAGE GLUCOSE BLD GHB EST-MCNC: 266 MG/DL
GLOBULIN SER CALC-MCNC: 3.5 G/DL (ref 2–4)
GLUCOSE SERPL-MCNC: 233 MG/DL (ref 74–99)
HBA1C MFR BLD: 10.9 % (ref 4.2–5.6)
HCT VFR BLD AUTO: 36.1 % (ref 35–45)
HDLC SERPL-MCNC: 82 MG/DL (ref 40–60)
HDLC SERPL: 2.2 {RATIO} (ref 0–5)
HGB BLD-MCNC: 11.6 G/DL (ref 12–16)
IMM GRANULOCYTES # BLD AUTO: 0 K/UL (ref 0–0.04)
IMM GRANULOCYTES NFR BLD AUTO: 0 % (ref 0–0.5)
LDLC SERPL CALC-MCNC: 85 MG/DL (ref 0–100)
LIPID PROFILE,FLP: ABNORMAL
LYMPHOCYTES # BLD: 2.9 K/UL (ref 0.9–3.6)
LYMPHOCYTES NFR BLD: 39 % (ref 21–52)
MCH RBC QN AUTO: 26.8 PG (ref 24–34)
MCHC RBC AUTO-ENTMCNC: 32.1 G/DL (ref 31–37)
MCV RBC AUTO: 83.4 FL (ref 78–100)
MICROALBUMIN UR-MCNC: 1.33 MG/DL (ref 0–3)
MICROALBUMIN/CREAT UR-RTO: 20 MG/G (ref 0–30)
MONOCYTES # BLD: 0.4 K/UL (ref 0.05–1.2)
MONOCYTES NFR BLD: 5 % (ref 3–10)
NEUTS SEG # BLD: 3.9 K/UL (ref 1.8–8)
NEUTS SEG NFR BLD: 52 % (ref 40–73)
NRBC # BLD: 0 K/UL (ref 0–0.01)
NRBC BLD-RTO: 0 PER 100 WBC
PLATELET # BLD AUTO: 358 K/UL (ref 135–420)
PMV BLD AUTO: 10.4 FL (ref 9.2–11.8)
POTASSIUM SERPL-SCNC: 4.2 MMOL/L (ref 3.5–5.5)
PROT SERPL-MCNC: 7 G/DL (ref 6.4–8.2)
RBC # BLD AUTO: 4.33 M/UL (ref 4.2–5.3)
SODIUM SERPL-SCNC: 137 MMOL/L (ref 136–145)
TRIGL SERPL-MCNC: 50 MG/DL (ref ?–150)
VLDLC SERPL CALC-MCNC: 10 MG/DL
WBC # BLD AUTO: 7.5 K/UL (ref 4.6–13.2)

## 2022-02-04 PROCEDURE — 82306 VITAMIN D 25 HYDROXY: CPT

## 2022-02-04 PROCEDURE — 80061 LIPID PANEL: CPT

## 2022-02-04 PROCEDURE — 85025 COMPLETE CBC W/AUTO DIFF WBC: CPT

## 2022-02-04 PROCEDURE — 83036 HEMOGLOBIN GLYCOSYLATED A1C: CPT

## 2022-02-04 PROCEDURE — 82043 UR ALBUMIN QUANTITATIVE: CPT

## 2022-02-04 PROCEDURE — 80053 COMPREHEN METABOLIC PANEL: CPT

## 2022-02-04 PROCEDURE — 84436 ASSAY OF TOTAL THYROXINE: CPT

## 2022-02-04 PROCEDURE — 36415 COLL VENOUS BLD VENIPUNCTURE: CPT

## 2022-02-05 LAB
T4 SERPL-MCNC: 7.5 UG/DL (ref 4.8–13.9)
TSH SERPL DL<=0.05 MIU/L-ACNC: 0.51 UIU/ML (ref 0.36–3.74)

## 2022-02-10 ENCOUNTER — HOSPITAL ENCOUNTER (OUTPATIENT)
Dept: MAMMOGRAPHY | Age: 57
Discharge: HOME | End: 2022-02-10
Attending: INTERNAL MEDICINE
Payer: COMMERCIAL

## 2022-02-10 DIAGNOSIS — Z12.31 VISIT FOR SCREENING MAMMOGRAM: ICD-10-CM

## 2022-02-10 PROCEDURE — 77063 BREAST TOMOSYNTHESIS BI: CPT

## 2022-03-05 ENCOUNTER — ANESTHESIA EVENT (OUTPATIENT)
Dept: SURGERY | Age: 57
DRG: 603 | End: 2022-03-05
Payer: COMMERCIAL

## 2022-03-05 ENCOUNTER — APPOINTMENT (OUTPATIENT)
Dept: GENERAL RADIOLOGY | Age: 57
DRG: 603 | End: 2022-03-05
Attending: EMERGENCY MEDICINE
Payer: COMMERCIAL

## 2022-03-05 ENCOUNTER — HOSPITAL ENCOUNTER (INPATIENT)
Age: 57
LOS: 4 days | Discharge: HOME | DRG: 603 | End: 2022-03-09
Attending: EMERGENCY MEDICINE | Admitting: STUDENT IN AN ORGANIZED HEALTH CARE EDUCATION/TRAINING PROGRAM
Payer: COMMERCIAL

## 2022-03-05 DIAGNOSIS — L03.011 CELLULITIS OF FINGER OF RIGHT HAND: Primary | ICD-10-CM

## 2022-03-05 PROBLEM — I10 HTN (HYPERTENSION): Status: ACTIVE | Noted: 2022-03-05

## 2022-03-05 PROBLEM — E11.9 DM (DIABETES MELLITUS) (HCC): Status: ACTIVE | Noted: 2022-03-05

## 2022-03-05 LAB
ALBUMIN SERPL-MCNC: 3.4 G/DL (ref 3.4–5)
ALBUMIN/GLOB SERPL: 0.8 {RATIO} (ref 0.8–1.7)
ALP SERPL-CCNC: 78 U/L (ref 45–117)
ALT SERPL-CCNC: 18 U/L (ref 13–56)
ANION GAP SERPL CALC-SCNC: 5 MMOL/L (ref 3–18)
AST SERPL-CCNC: 10 U/L (ref 10–38)
BASOPHILS # BLD: 0.1 K/UL (ref 0–0.1)
BASOPHILS NFR BLD: 1 % (ref 0–2)
BILIRUB SERPL-MCNC: 0.4 MG/DL (ref 0.2–1)
BUN SERPL-MCNC: 11 MG/DL (ref 7–18)
BUN/CREAT SERPL: 17 (ref 12–20)
CALCIUM SERPL-MCNC: 9.1 MG/DL (ref 8.5–10.1)
CHLORIDE SERPL-SCNC: 100 MMOL/L (ref 100–111)
CO2 SERPL-SCNC: 30 MMOL/L (ref 21–32)
COVID-19 RAPID TEST, COVR: NOT DETECTED
CREAT SERPL-MCNC: 0.65 MG/DL (ref 0.6–1.3)
DIFFERENTIAL METHOD BLD: NORMAL
EOSINOPHIL # BLD: 0.1 K/UL (ref 0–0.4)
EOSINOPHIL NFR BLD: 1 % (ref 0–5)
ERYTHROCYTE [DISTWIDTH] IN BLOOD BY AUTOMATED COUNT: 13.2 % (ref 11.6–14.5)
GLOBULIN SER CALC-MCNC: 4.4 G/DL (ref 2–4)
GLUCOSE BLD STRIP.AUTO-MCNC: 207 MG/DL (ref 70–110)
GLUCOSE BLD STRIP.AUTO-MCNC: 260 MG/DL (ref 70–110)
GLUCOSE SERPL-MCNC: 302 MG/DL (ref 74–99)
HCT VFR BLD AUTO: 35.8 % (ref 35–45)
HGB BLD-MCNC: 12 G/DL (ref 12–16)
IMM GRANULOCYTES # BLD AUTO: 0 K/UL (ref 0–0.04)
IMM GRANULOCYTES NFR BLD AUTO: 0 % (ref 0–0.5)
LACTATE BLD-SCNC: 0.79 MMOL/L (ref 0.4–2)
LYMPHOCYTES # BLD: 2.5 K/UL (ref 0.9–3.6)
LYMPHOCYTES NFR BLD: 24 % (ref 21–52)
MCH RBC QN AUTO: 27.1 PG (ref 24–34)
MCHC RBC AUTO-ENTMCNC: 33.5 G/DL (ref 31–37)
MCV RBC AUTO: 81 FL (ref 78–100)
MONOCYTES # BLD: 0.7 K/UL (ref 0.05–1.2)
MONOCYTES NFR BLD: 6 % (ref 3–10)
NEUTS SEG # BLD: 7.2 K/UL (ref 1.8–8)
NEUTS SEG NFR BLD: 69 % (ref 40–73)
NRBC # BLD: 0 K/UL (ref 0–0.01)
NRBC BLD-RTO: 0 PER 100 WBC
PLATELET # BLD AUTO: 340 K/UL (ref 135–420)
PMV BLD AUTO: 10.4 FL (ref 9.2–11.8)
POTASSIUM SERPL-SCNC: 3.7 MMOL/L (ref 3.5–5.5)
PROT SERPL-MCNC: 7.8 G/DL (ref 6.4–8.2)
RBC # BLD AUTO: 4.42 M/UL (ref 4.2–5.3)
SODIUM SERPL-SCNC: 135 MMOL/L (ref 136–145)
SOURCE, COVRS: NORMAL
WBC # BLD AUTO: 10.4 K/UL (ref 4.6–13.2)

## 2022-03-05 PROCEDURE — 74011250636 HC RX REV CODE- 250/636: Performed by: EMERGENCY MEDICINE

## 2022-03-05 PROCEDURE — 74011250636 HC RX REV CODE- 250/636: Performed by: PHYSICIAN ASSISTANT

## 2022-03-05 PROCEDURE — 83605 ASSAY OF LACTIC ACID: CPT

## 2022-03-05 PROCEDURE — 96374 THER/PROPH/DIAG INJ IV PUSH: CPT

## 2022-03-05 PROCEDURE — 85025 COMPLETE CBC W/AUTO DIFF WBC: CPT

## 2022-03-05 PROCEDURE — 65660000000 HC RM CCU STEPDOWN

## 2022-03-05 PROCEDURE — 99285 EMERGENCY DEPT VISIT HI MDM: CPT

## 2022-03-05 PROCEDURE — 73140 X-RAY EXAM OF FINGER(S): CPT

## 2022-03-05 PROCEDURE — 87040 BLOOD CULTURE FOR BACTERIA: CPT

## 2022-03-05 PROCEDURE — 99253 IP/OBS CNSLTJ NEW/EST LOW 45: CPT | Performed by: STUDENT IN AN ORGANIZED HEALTH CARE EDUCATION/TRAINING PROGRAM

## 2022-03-05 PROCEDURE — 80053 COMPREHEN METABOLIC PANEL: CPT

## 2022-03-05 PROCEDURE — 2709999900 HC NON-CHARGEABLE SUPPLY

## 2022-03-05 PROCEDURE — 74011636637 HC RX REV CODE- 636/637: Performed by: PHYSICIAN ASSISTANT

## 2022-03-05 PROCEDURE — 74011250637 HC RX REV CODE- 250/637: Performed by: PHYSICIAN ASSISTANT

## 2022-03-05 PROCEDURE — 82962 GLUCOSE BLOOD TEST: CPT

## 2022-03-05 PROCEDURE — 87635 SARS-COV-2 COVID-19 AMP PRB: CPT

## 2022-03-05 PROCEDURE — APPSS45 APP SPLIT SHARED TIME 31-45 MINUTES: Performed by: PHYSICIAN ASSISTANT

## 2022-03-05 PROCEDURE — 74011000250 HC RX REV CODE- 250: Performed by: PHYSICIAN ASSISTANT

## 2022-03-05 PROCEDURE — 74011000258 HC RX REV CODE- 258: Performed by: EMERGENCY MEDICINE

## 2022-03-05 RX ORDER — DILTIAZEM HYDROCHLORIDE 240 MG/1
240 CAPSULE, COATED, EXTENDED RELEASE ORAL DAILY
Status: DISCONTINUED | OUTPATIENT
Start: 2022-03-06 | End: 2022-03-05

## 2022-03-05 RX ORDER — HEPARIN SODIUM 5000 [USP'U]/ML
5000 INJECTION, SOLUTION INTRAVENOUS; SUBCUTANEOUS EVERY 8 HOURS
Status: DISCONTINUED | OUTPATIENT
Start: 2022-03-05 | End: 2022-03-07

## 2022-03-05 RX ORDER — VANCOMYCIN 2 GRAM/500 ML IN 0.9 % SODIUM CHLORIDE INTRAVENOUS
2 ONCE
Status: COMPLETED | OUTPATIENT
Start: 2022-03-05 | End: 2022-03-05

## 2022-03-05 RX ORDER — SODIUM CHLORIDE 0.9 % (FLUSH) 0.9 %
5-10 SYRINGE (ML) INJECTION AS NEEDED
Status: DISCONTINUED | OUTPATIENT
Start: 2022-03-05 | End: 2022-03-09 | Stop reason: HOSPADM

## 2022-03-05 RX ORDER — HYDROCODONE BITARTRATE AND ACETAMINOPHEN 5; 325 MG/1; MG/1
1 TABLET ORAL
Status: DISCONTINUED | OUTPATIENT
Start: 2022-03-05 | End: 2022-03-06

## 2022-03-05 RX ORDER — MAGNESIUM SULFATE 100 %
4 CRYSTALS MISCELLANEOUS AS NEEDED
Status: DISCONTINUED | OUTPATIENT
Start: 2022-03-05 | End: 2022-03-09 | Stop reason: HOSPADM

## 2022-03-05 RX ORDER — LISINOPRIL 20 MG/1
40 TABLET ORAL DAILY
Status: DISCONTINUED | OUTPATIENT
Start: 2022-03-06 | End: 2022-03-09 | Stop reason: HOSPADM

## 2022-03-05 RX ORDER — GUAIFENESIN 100 MG/5ML
81 LIQUID (ML) ORAL DAILY
Status: DISCONTINUED | OUTPATIENT
Start: 2022-03-06 | End: 2022-03-09 | Stop reason: HOSPADM

## 2022-03-05 RX ORDER — DILTIAZEM HYDROCHLORIDE 240 MG/1
240 CAPSULE, COATED, EXTENDED RELEASE ORAL 2 TIMES DAILY
Status: DISCONTINUED | OUTPATIENT
Start: 2022-03-05 | End: 2022-03-05

## 2022-03-05 RX ORDER — INSULIN ASPART 100 [IU]/ML
20 INJECTION, SOLUTION INTRAVENOUS; SUBCUTANEOUS 2 TIMES DAILY
Status: ON HOLD | COMMUNITY
End: 2022-03-09 | Stop reason: SDUPTHER

## 2022-03-05 RX ORDER — DEXTROSE MONOHYDRATE 100 MG/ML
0-250 INJECTION, SOLUTION INTRAVENOUS AS NEEDED
Status: DISCONTINUED | OUTPATIENT
Start: 2022-03-05 | End: 2022-03-09 | Stop reason: HOSPADM

## 2022-03-05 RX ORDER — INSULIN LISPRO 100 [IU]/ML
INJECTION, SOLUTION INTRAVENOUS; SUBCUTANEOUS
Status: DISCONTINUED | OUTPATIENT
Start: 2022-03-05 | End: 2022-03-09 | Stop reason: HOSPADM

## 2022-03-05 RX ORDER — ACETAMINOPHEN 325 MG/1
650 TABLET ORAL
Status: DISCONTINUED | OUTPATIENT
Start: 2022-03-05 | End: 2022-03-09 | Stop reason: HOSPADM

## 2022-03-05 RX ORDER — MORPHINE SULFATE 2 MG/ML
2 INJECTION, SOLUTION INTRAMUSCULAR; INTRAVENOUS
Status: DISCONTINUED | OUTPATIENT
Start: 2022-03-05 | End: 2022-03-08

## 2022-03-05 RX ORDER — DILTIAZEM HYDROCHLORIDE 240 MG/1
240 CAPSULE, COATED, EXTENDED RELEASE ORAL DAILY
Status: DISCONTINUED | OUTPATIENT
Start: 2022-03-06 | End: 2022-03-09 | Stop reason: HOSPADM

## 2022-03-05 RX ORDER — ATORVASTATIN CALCIUM 20 MG/1
20 TABLET, FILM COATED ORAL
Status: DISCONTINUED | OUTPATIENT
Start: 2022-03-05 | End: 2022-03-09 | Stop reason: HOSPADM

## 2022-03-05 RX ORDER — SODIUM CHLORIDE 0.9 % (FLUSH) 0.9 %
5-40 SYRINGE (ML) INJECTION EVERY 8 HOURS
Status: DISCONTINUED | OUTPATIENT
Start: 2022-03-05 | End: 2022-03-09 | Stop reason: HOSPADM

## 2022-03-05 RX ORDER — GABAPENTIN 300 MG/1
900 CAPSULE ORAL 3 TIMES DAILY
Status: DISCONTINUED | OUTPATIENT
Start: 2022-03-05 | End: 2022-03-09 | Stop reason: HOSPADM

## 2022-03-05 RX ORDER — HYDROCHLOROTHIAZIDE 25 MG/1
25 TABLET ORAL DAILY
Status: DISCONTINUED | OUTPATIENT
Start: 2022-03-06 | End: 2022-03-09 | Stop reason: HOSPADM

## 2022-03-05 RX ORDER — SODIUM CHLORIDE 0.9 % (FLUSH) 0.9 %
5-40 SYRINGE (ML) INJECTION AS NEEDED
Status: DISCONTINUED | OUTPATIENT
Start: 2022-03-05 | End: 2022-03-09 | Stop reason: HOSPADM

## 2022-03-05 RX ADMIN — GABAPENTIN 900 MG: 300 CAPSULE ORAL at 17:44

## 2022-03-05 RX ADMIN — PIPERACILLIN AND TAZOBACTAM 3.38 G: 3; .375 INJECTION, POWDER, LYOPHILIZED, FOR SOLUTION INTRAVENOUS at 11:40

## 2022-03-05 RX ADMIN — LISINOPRIL 30 MG: 20 TABLET ORAL at 17:44

## 2022-03-05 RX ADMIN — HEPARIN SODIUM 5000 UNITS: 5000 INJECTION, SOLUTION INTRAVENOUS; SUBCUTANEOUS at 20:43

## 2022-03-05 RX ADMIN — WATER 1 G: 1 INJECTION INTRAMUSCULAR; INTRAVENOUS; SUBCUTANEOUS at 17:46

## 2022-03-05 RX ADMIN — SODIUM CHLORIDE, PRESERVATIVE FREE 10 ML: 5 INJECTION INTRAVENOUS at 21:29

## 2022-03-05 RX ADMIN — HYDROCODONE BITARTRATE AND ACETAMINOPHEN 1 TABLET: 5; 325 TABLET ORAL at 17:44

## 2022-03-05 RX ADMIN — ATORVASTATIN CALCIUM 20 MG: 20 TABLET, FILM COATED ORAL at 22:13

## 2022-03-05 RX ADMIN — GABAPENTIN 900 MG: 300 CAPSULE ORAL at 22:13

## 2022-03-05 RX ADMIN — SODIUM CHLORIDE, PRESERVATIVE FREE 10 ML: 5 INJECTION INTRAVENOUS at 17:46

## 2022-03-05 RX ADMIN — MORPHINE SULFATE 2 MG: 2 INJECTION, SOLUTION INTRAMUSCULAR; INTRAVENOUS at 20:35

## 2022-03-05 RX ADMIN — DILTIAZEM HYDROCHLORIDE 240 MG: 240 CAPSULE, COATED, EXTENDED RELEASE ORAL at 17:44

## 2022-03-05 RX ADMIN — Medication 9 UNITS: at 16:51

## 2022-03-05 RX ADMIN — VANCOMYCIN HYDROCHLORIDE 2000 MG: 10 INJECTION, POWDER, LYOPHILIZED, FOR SOLUTION INTRAVENOUS at 20:42

## 2022-03-05 RX ADMIN — Medication 6 UNITS: at 22:00

## 2022-03-05 RX ADMIN — HYDROCODONE BITARTRATE AND ACETAMINOPHEN 1 TABLET: 5; 325 TABLET ORAL at 22:14

## 2022-03-05 NOTE — ED NOTES
TRANSFER - OUT REPORT:    Verbal report given to Jared RN on Savana Duran  being [facility] for routine progression of care       Report consisted of patients Situation, Background, Assessment and   Recommendations(SBAR). Information from the following report(s) SBAR, ED Summary and MAR was reviewed with the receiving nurse. Lines:   Peripheral IV 03/05/22 Left Antecubital (Active)   Site Assessment Clean, dry, & intact 03/05/22 1128   Phlebitis Assessment 0 03/05/22 1128   Infiltration Assessment 0 03/05/22 1128   Dressing Status Clean, dry, & intact 03/05/22 1128   Dressing Type Transparent 03/05/22 1128   Hub Color/Line Status Pink;Patent; Flushed 03/05/22 1128        Opportunity for questions and clarification was provided.

## 2022-03-05 NOTE — ED TRIAGE NOTES
Patient c/o redness and swelling to right hand that she noted on awakening this morning. C/o pain and difficulty bending fingers. Denies injury or wound. Noted to have extensive redness and swelling along 4th finger that extends into dorsum and palmar aspect of hand.

## 2022-03-05 NOTE — H&P
History and Physical          Subjective     HPI: Elia Zheng is a 55 y.o. female with a PMHx of HTN, DM, CVA, AKI who presented to the ED with c/o redness, severe pain, swelling to right ring finger associated with decreased ROM. She noticed a small \"red spot\" which appears to be a tiny abrasion yesterday. 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 She does not recall injuring herself or poking herself with anything at work or at home. She denies fever, cp, sob, cough, abd pain, nvd. She has never had a history of severe/MDR bacterial infection. Her home medications were reconciled according to her medication list, and she confirms she takes both cardizem and lisinopril twice daily. In the ED, vitals are stable, lactic 0.79, WBC 10.4. XR with edema/inflammation, no soft tissue gas or OM. Ortho was consulted. Patient will be admitted for further evaluation and treatment.       PMHx:  Past Medical History:   Diagnosis Date    Arthritis     Diabetes ([facility]     Hypertension     Morbid obesity ([facility]     BMI-50    Psychiatric disorder     CLAUSTROPHOBIA    Stroke ([facility] 2006    no residuals    Unspecified sleep apnea     uses cpap    Vitamin D deficiency        PSurgHx:  Past Surgical History:   Procedure Laterality Date    COLONOSCOPY N/A 10/7/2016    COLONOSCOPY aborted poor prep performed by Celeste Petty MD at [address] COLONOSCOPY N/A 10/21/2016    COLONOSCOPY performed by Raisa Bocanegra MD at [address] HX HYSTERECTOMY  2006    HX KNEE ARTHROSCOPY  2005 AND 2012    RIGHT KNEE IN 2005, LEFT KNEE IN 2012    OK BREAST SURGERY PROCEDURE UNLISTED  1970'S    LEFT BREAST BX X2     US GUIDED CORE BREAST BIOPSY      LT. br. 2016 benign fat necrosis       SocialHx:  Social History     Socioeconomic History    Marital status:      Spouse name: Not on file    Number of children: Not on file    Years of education: Not on file    Highest education level: Not on file   Occupational History    Not on file   Tobacco Use    Smoking status: Never Smoker    Smokeless tobacco: Never Used   Substance and Sexual Activity    Alcohol use: No    Drug use: No    Sexual activity: Not on file   Other Topics Concern    Not on file   Social History Narrative    Not on file     Social Determinants of Health     Financial Resource Strain:     Difficulty of Paying Living Expenses: Not on file   Food Insecurity:     Worried About Running Out of Food in the Last Year: Not on file    Narcisa of Food in the Last Year: Not on file   Transportation Needs:     Lack of Transportation (Medical): Not on file    Lack of Transportation (Non-Medical): Not on file   Physical Activity:     Days of Exercise per Week: Not on file    Minutes of Exercise per Session: Not on file   Stress:     Feeling of Stress : Not on file   Social Connections:     Frequency of Communication with Friends and Family: Not on file    Frequency of Social Gatherings with Friends and Family: Not on file    Attends Sabianist Services: Not on file    Active Member of [address] [place] [address] or Organizations: Not on file    Attends Club or Organization Meetings: Not on file    Marital Status: Not on file   Intimate Partner Violence:     Fear of Current or Ex-Partner: Not on file    Emotionally Abused: Not on file    Physically Abused: Not on file    Sexually Abused: Not on file   Housing Stability:     Unable to Pay for Housing in the Last Year: Not on file    Number of [place] in the Last Year: Not on file    Unstable Housing in the Last Year: Not on file       FamilyHx:  Family History   Problem Relation Age of Onset    Cancer Maternal Grandmother        Home Medications:  Prior to Admission Medications   Prescriptions Last Dose Informant Patient Reported? Taking?   aspirin 81 mg chewable tablet   Yes Yes   Sig: Take 81 mg by mouth daily.      diclofenac EC (VOLTAREN) 75 mg EC tablet   Yes Yes   Sig: Take 75 mg by mouth two (2) times a day. diltiazem hcl 240 mg Tb24   Yes Yes   Sig: Take 1 Tab by mouth two (2) times a day. Indications: HYPERTENSION   gabapentin (NEURONTIN) 300 mg capsule   Yes Yes   Sig: Take 900 mg by mouth three (3) times daily. Indications: neuropathic pain   hydrochlorothiazide (HYDRODIURIL) 25 mg tablet   Yes Yes   Sig: Take 25 mg by mouth daily. Indications: HYPERTENSION   insulin aspart U-100 (NovoLOG Flexpen U-100 Insulin) 100 unit/mL (3 mL) inpn   Yes Yes   Si Units by SubCUTAneous route two (2) times a day. lisinopril (PRINIVIL, ZESTRIL) 30 mg tablet   Yes Yes   Sig: Take 30 mg by mouth two (2) times a day. metFORMIN (GLUCOPHAGE) 500 mg tablet   Yes Yes   Sig: Take 1,000 mg by mouth two (2) times daily (with meals). simvastatin (ZOCOR) 40 mg tablet   Yes Yes   Sig: Take 40 mg by mouth nightly. Facility-Administered Medications: None       Allergies:  No Known Allergies     Review of Systems:  CONST: no weight loss, no falls, no fever or chills  HEENT: No change in vision, no earache, no tinnitus, no sore throat or sinus congestion. NECK: No pain or stiffness. PULM: No shortness of breath, no cough or wheeze. CV: no pnd or orthopnea, no CP, no palpitations, no edema  GI: No abdominal pain, no nausea, no vomiting or diarrhea  : No urinary frequency, no urgency, no hesitancy or dysuria. MSK: no back pain, no recent trauma, +pain to R MCP joint. INTEG: No rash, no itching, +lesions. NEURO No headache, no dizziness, no numbness, no tingling or weakness. Objective     Physical Exam:  Visit Vitals  BP (!) 150/71 (BP 1 Location: Right upper arm, BP Patient Position: At rest)   Pulse 75   Temp 98.4 °F (36.9 °C)   Resp 18   Ht 5' 2\" (1.575 m)   Wt 103 kg (227 lb)   SpO2 100%   BMI 41.52 kg/m²       General: NAD, appears stated age, alert  Skin: skin of entire right forearm is warm to touch compared to left arm.  Noticeable erythema and edema is present proximal to right MCP joint and is circumferential. See picture below. Tiny abrasion visible, no active drainage. Eyes: PERRL, sclera is non-icteric  HENT: normocephalic/atraumatic, moist mucus membranes  Respiratory: CTA with no signs of respiratory distress  Cardiovascular: RRR, no m/r/g  GI: soft, non-tender, normal bowel sounds  Extremities: no cyanosis, limited ROM of right ring finger 2/2 pain. Joint and finger are exquisitely TTP  Neuro: no focal deficits, normal speech  Psych: appropriate mood and affect            Laboratory Studies:  Recent Results (from the past 24 hour(s))   CBC WITH AUTOMATED DIFF    Collection Time: 03/05/22 11:20 AM   Result Value Ref Range    WBC 10.4 4.6 - 13.2 K/uL    RBC 4.42 4.20 - 5.30 M/uL    HGB 12.0 12.0 - 16.0 g/dL    HCT 35.8 35.0 - 45.0 %    MCV 81.0 78.0 - 100.0 FL    MCH 27.1 24.0 - 34.0 PG    MCHC 33.5 31.0 - 37.0 g/dL    RDW 13.2 11.6 - 14.5 %    PLATELET 864 200 - 804 K/uL    MPV 10.4 9.2 - 11.8 FL    NRBC 0.0 0  WBC    ABSOLUTE NRBC 0.00 0.00 - 0.01 K/uL    NEUTROPHILS 69 40 - 73 %    LYMPHOCYTES 24 21 - 52 %    MONOCYTES 6 3 - 10 %    EOSINOPHILS 1 0 - 5 %    BASOPHILS 1 0 - 2 %    IMMATURE GRANULOCYTES 0 0.0 - 0.5 %    ABS. NEUTROPHILS 7.2 1.8 - 8.0 K/UL    ABS. LYMPHOCYTES 2.5 0.9 - 3.6 K/UL    ABS. MONOCYTES 0.7 0.05 - 1.2 K/UL    ABS. EOSINOPHILS 0.1 0.0 - 0.4 K/UL    ABS. BASOPHILS 0.1 0.0 - 0.1 K/UL    ABS. IMM.  GRANS. 0.0 0.00 - 0.04 K/UL    DF AUTOMATED     METABOLIC PANEL, COMPREHENSIVE    Collection Time: 03/05/22 11:20 AM   Result Value Ref Range    Sodium 135 (L) 136 - 145 mmol/L    Potassium 3.7 3.5 - 5.5 mmol/L    Chloride 100 100 - 111 mmol/L    CO2 30 21 - 32 mmol/L    Anion gap 5 3.0 - 18 mmol/L    Glucose 302 (H) 74 - 99 mg/dL    BUN 11 7.0 - 18 MG/DL    Creatinine 0.65 0.6 - 1.3 MG/DL    BUN/Creatinine ratio 17 12 - 20      GFR est AA >60 >60 ml/min/1.73m2    GFR est non-AA >60 >60 ml/min/1.73m2    Calcium 9.1 8.5 - 10.1 MG/DL Bilirubin, total 0.4 0.2 - 1.0 MG/DL    ALT (SGPT) 18 13 - 56 U/L    AST (SGOT) 10 10 - 38 U/L    Alk. phosphatase 78 45 - 117 U/L    Protein, total 7.8 6.4 - 8.2 g/dL    Albumin 3.4 3.4 - 5.0 g/dL    Globulin 4.4 (H) 2.0 - 4.0 g/dL    A-G Ratio 0.8 0.8 - 1.7     POC LACTIC ACID    Collection Time: 03/05/22 11:24 AM   Result Value Ref Range    Lactic Acid (POC) 0.79 0.40 - 2.00 mmol/L   COVID-19 RAPID TEST    Collection Time: 03/05/22  2:15 PM   Result Value Ref Range    Specimen source Nasopharyngeal      COVID-19 rapid test Not detected NOTD         Imaging Reviewed:  XR 4TH FINGER RT MIN 2 V    Result Date: 3/5/2022  Right fourth digit radiographs HISTORY: Redness and swelling right hand upon waking, pain and difficulty bending fingers, extensive redness and swelling fourth digit. COMPARISON: None. FINDINGS: Three views obtained. There is no fracture or dislocation. The joint spaces are maintained. Mineralization is normal.   There is edema or inflammation at the base of the fourth digit. No soft tissue gas. There is no radiopaque foreign body. Edema or inflammation at the base of the fourth digit. No soft tissue gas or bony abnormality.           Assessment/Plan     Hospital Problems  Date Reviewed: 10/6/2016          Codes Class Noted POA    * (Principal) Cellulitis of finger of right hand ICD-10-CM: L03.011  ICD-9-CM: 681.00  3/5/2022 Unknown        HTN (hypertension) ICD-10-CM: I10  ICD-9-CM: 401.9  3/5/2022 Unknown        DM [facility]diabetes mellitus) ([facility] ICD-10-CM: E11.9  ICD-9-CM: 250.00  3/5/2022 Unknown            Cellulitis  - Vanc/rocephin  - f/u BCx  - obtain nasal MRSA swab  - appreciate ortho; planning for I&D tomorrow  - NPO p MN  - I have asked nursing to joseph line of erythema today  - PRN pain control    HTN  - cont home meds and monitor BP    DM  - ssi, monitor achs   - a1c 10.9 one month ago  - DM mgmt consult    Anticipated Discharge: >2 days    DVT Prophylaxis:  []Lovenox  [x]Hep SQ []SCDs  []Coumadin   []On Heparin gtt []PO anticoagulant    I have personally reviewed all pertinent labs, films and EKGs that have officially resulted. I reviewed available electronic documentation outlining the initial presentation as well as the emergency room physician's encounter.     RENALDO [facility]  Hospitalist Division  Office:  [phone]  Pager: [phone]

## 2022-03-05 NOTE — ED PROVIDER NOTES
EMERGENCY DEPARTMENT HISTORY AND PHYSICAL EXAM    11:14 AM patient seen at this time in room 9      Date: 3/5/2022  Patient Name: Mary Johnston    History of Presenting Illness     Chief Complaint   Patient presents with    Hand Swelling         History Provided By: patient    Additional History (Context): Mary Johnston is a 55 y.o. female presents with this morning with severe swelling and redness. Of her right fourth digit proximal phalanx no known injury. Was normal yesterday. History of diabetes. Moderate to severe pain worse with movement. PCP: Aida Joseph MD    Chief Complaint:   Duration:    Timing:    Location:   Quality:   Severity:   Modifying Factors:   Associated Symptoms:       Current Facility-Administered Medications   Medication Dose Route Frequency Provider Last Rate Last Admin    sodium chloride (NS) flush 5-10 mL  5-10 mL IntraVENous PRN Anabel Fry MD         Current Outpatient Medications   Medication Sig Dispense Refill    insulin aspart U-100 (NovoLOG Flexpen U-100 Insulin) 100 unit/mL (3 mL) inpn 20 Units by SubCUTAneous route two (2) times a day.  simvastatin (ZOCOR) 40 mg tablet Take 40 mg by mouth nightly.  diclofenac EC (VOLTAREN) 75 mg EC tablet Take 75 mg by mouth two (2) times a day.  metFORMIN (GLUCOPHAGE) 500 mg tablet Take 1,000 mg by mouth two (2) times daily (with meals).  gabapentin (NEURONTIN) 300 mg capsule Take 300 mg by mouth three (3) times daily. Indications: NEUROPATHIC PAIN      aspirin 81 mg chewable tablet Take 81 mg by mouth daily.  lisinopril (PRINIVIL, ZESTRIL) 30 mg tablet Take 30 mg by mouth two (2) times a day.  diltiazem hcl 240 mg Tb24 Take 1 Tab by mouth two (2) times a day. Indications: HYPERTENSION      hydrochlorothiazide (HYDRODIURIL) 25 mg tablet Take 25 mg by mouth daily.     Indications: HYPERTENSION         Past History     Past Medical History:  Past Medical History:   Diagnosis Date    Arthritis     [facility] [address]     Hypertension     Morbid obesity ([facility] [address]     BMI-50    Psychiatric disorder     CLAUSTROPHOBIA    Stroke ([facility] [address] 2006    no residuals    Unspecified sleep apnea     uses cpap    Vitamin D deficiency        Past Surgical History:  Past Surgical History:   Procedure Laterality Date    COLONOSCOPY N/A 10/7/2016    COLONOSCOPY aborted poor prep performed by Darrion Chowdhury MD at [address] COLONOSCOPY N/A 10/21/2016    COLONOSCOPY performed by Mila Gan MD at [address] HX HYSTERECTOMY  2006    HX KNEE ARTHROSCOPY  2005 AND 2012    RIGHT KNEE IN 2005, LEFT KNEE IN 2012    NM BREAST SURGERY PROCEDURE UNLISTED  1970'S    LEFT BREAST BX X2     US GUIDED CORE BREAST BIOPSY      LT. br. 2016 benign fat necrosis       Family History:  Family History   Problem Relation Age of Onset    Cancer Maternal Grandmother        Social History:  Social History     Tobacco Use    Smoking status: Never Smoker    Smokeless tobacco: Never Used   Substance Use Topics    Alcohol use: No    Drug use: No       Allergies:  No Known Allergies      Review of Systems     Review of Systems   Constitutional: Negative for diaphoresis and fever. HENT: Negative for congestion and sore throat. Eyes: Negative for pain and itching. Respiratory: Negative for cough and shortness of breath. Cardiovascular: Negative for chest pain and palpitations. Gastrointestinal: Negative for abdominal pain and diarrhea. Endocrine: Negative for polydipsia and polyuria. Genitourinary: Negative for dysuria and hematuria. Musculoskeletal: Positive for joint swelling. Negative for arthralgias and myalgias. Skin: Positive for rash. Negative for wound. Neurological: Negative for seizures and syncope. Hematological: Does not bruise/bleed easily. Psychiatric/Behavioral: Negative for agitation and hallucinations.          Physical Exam       Patient Vitals for the past 12 hrs:   Temp Pulse Resp BP SpO2   03/05/22 1256 -- -- -- (!) 179/59 100 %   03/05/22 1214 -- -- -- (!) 187/89 100 %   03/05/22 1153 -- -- -- -- 100 %   03/05/22 1050 98.7 °F (37.1 °C) 84 18 (!) 182/81 97 %       IPVITALS  Patient Vitals for the past 24 hrs:   BP Temp Pulse Resp SpO2 Height Weight   03/05/22 1256 (!) 179/59 -- -- -- 100 % -- --   03/05/22 1214 (!) 187/89 -- -- -- 100 % -- --   03/05/22 1153 -- -- -- -- 100 % -- --   03/05/22 1050 (!) 182/81 98.7 °F (37.1 °C) 84 18 97 % 5' 2\" (1.575 m) 103 kg (227 lb)       Physical Exam  Vitals and nursing note reviewed. Constitutional:       Appearance: She is well-developed. HENT:      Head: Normocephalic and atraumatic. Eyes:      General: No scleral icterus. Conjunctiva/sclera: Conjunctivae normal.   Neck:      Vascular: No JVD. Cardiovascular:      Rate and Rhythm: Normal rate and regular rhythm. Heart sounds: Normal heart sounds. Comments: 4 intact extremity pulses  Pulmonary:      Effort: Pulmonary effort is normal.      Breath sounds: Normal breath sounds. Abdominal:      Palpations: Abdomen is soft. There is no mass. Tenderness: There is no abdominal tenderness. Musculoskeletal:         General: Swelling and tenderness present. Normal range of motion. Cervical back: Normal range of motion and neck supple. Comments: Warmth and redness starting to get into the hand. Lymphadenopathy:      Cervical: No cervical adenopathy. Skin:     General: Skin is warm and dry. Neurological:      Mental Status: She is alert.                Diagnostic Study Results   Labs -  Recent Results (from the past 24 hour(s))   CBC WITH AUTOMATED DIFF    Collection Time: 03/05/22 11:20 AM   Result Value Ref Range    WBC 10.4 4.6 - 13.2 K/uL    RBC 4.42 4.20 - 5.30 M/uL    HGB 12.0 12.0 - 16.0 g/dL    HCT 35.8 35.0 - 45.0 %    MCV 81.0 78.0 - 100.0 FL    MCH 27.1 24.0 - 34.0 PG    MCHC 33.5 31.0 - 37.0 g/dL    RDW 13.2 11.6 - 14.5 %    PLATELET 258 754 - 420 K/uL    MPV 10.4 9.2 - 11.8 FL    NRBC 0.0 0  WBC    ABSOLUTE NRBC 0.00 0.00 - 0.01 K/uL    NEUTROPHILS 69 40 - 73 %    LYMPHOCYTES 24 21 - 52 %    MONOCYTES 6 3 - 10 %    EOSINOPHILS 1 0 - 5 %    BASOPHILS 1 0 - 2 %    IMMATURE GRANULOCYTES 0 0.0 - 0.5 %    ABS. NEUTROPHILS 7.2 1.8 - 8.0 K/UL    ABS. LYMPHOCYTES 2.5 0.9 - 3.6 K/UL    ABS. MONOCYTES 0.7 0.05 - 1.2 K/UL    ABS. EOSINOPHILS 0.1 0.0 - 0.4 K/UL    ABS. BASOPHILS 0.1 0.0 - 0.1 K/UL    ABS. IMM. GRANS. 0.0 0.00 - 0.04 K/UL    DF AUTOMATED     METABOLIC PANEL, COMPREHENSIVE    Collection Time: 03/05/22 11:20 AM   Result Value Ref Range    Sodium 135 (L) 136 - 145 mmol/L    Potassium 3.7 3.5 - 5.5 mmol/L    Chloride 100 100 - 111 mmol/L    CO2 30 21 - 32 mmol/L    Anion gap 5 3.0 - 18 mmol/L    Glucose 302 (H) 74 - 99 mg/dL    BUN 11 7.0 - 18 MG/DL    Creatinine 0.65 0.6 - 1.3 MG/DL    BUN/Creatinine ratio 17 12 - 20      GFR est AA >60 >60 ml/min/1.73m2    GFR est non-AA >60 >60 ml/min/1.73m2    Calcium 9.1 8.5 - 10.1 MG/DL    Bilirubin, total 0.4 0.2 - 1.0 MG/DL    ALT (SGPT) 18 13 - 56 U/L    AST (SGOT) 10 10 - 38 U/L    Alk. phosphatase 78 45 - 117 U/L    Protein, total 7.8 6.4 - 8.2 g/dL    Albumin 3.4 3.4 - 5.0 g/dL    Globulin 4.4 (H) 2.0 - 4.0 g/dL    A-G Ratio 0.8 0.8 - 1.7     POC LACTIC ACID    Collection Time: 03/05/22 11:24 AM   Result Value Ref Range    Lactic Acid (POC) 0.79 0.40 - 2.00 mmol/L       Radiologic Studies -   XR 4TH FINGER RT MIN 2 V   Final Result      Edema or inflammation at the base of the fourth digit. No soft tissue gas or   bony abnormality. XR 4TH FINGER RT MIN 2 V    Result Date: 3/5/2022  Right fourth digit radiographs HISTORY: Redness and swelling right hand upon waking, pain and difficulty bending fingers, extensive redness and swelling fourth digit. COMPARISON: None. FINDINGS: Three views obtained. There is no fracture or dislocation. The joint spaces are maintained.     Mineralization is normal. There is edema or inflammation at the base of the fourth digit. No soft tissue gas. There is no radiopaque foreign body. Edema or inflammation at the base of the fourth digit. No soft tissue gas or bony abnormality. Medications ordered:   Medications   sodium chloride (NS) flush 5-10 mL (has no administration in time range)   piperacillin-tazobactam (ZOSYN) 3.375 g in 0.9% sodium chloride (MBP/ADV) 100 mL MBP (0 g IntraVENous IV Completed 3/5/22 1145)         Medical Decision Making   Initial Medical Decision Making and DDx:  Cellulitis versus abscess. Sepsis orders entered    No alarming events during her stay in the ER no fever or leukocytosis will admit    ED Course: Progress Notes, Reevaluation, and Consults:  ED Course as of 03/05/22 1417   Sat Mar 05, 2022   1400 Discussed with Dr. Rodriguez Postal hospitalist will admit. Late entry discussed with Dr. Terra Burrell via perfect serve and over phone sent representative pictures discussed thin layer of presumably purulent material on ultrasound, just a few millimeters. He agrees to follow the patient and agrees with antibiotics at this point [CB]      ED Course User Index  [CB] Shorty Cordova MD         I am the first provider for this patient. I reviewed the vital signs, available nursing notes, past medical history, past surgical history, family history and social history. Patient Vitals for the past 12 hrs:   Temp Pulse Resp BP SpO2   03/05/22 1256 -- -- -- (!) 179/59 100 %   03/05/22 1214 -- -- -- (!) 187/89 100 %   03/05/22 1153 -- -- -- -- 100 %   03/05/22 1050 98.7 °F (37.1 °C) 84 18 (!) 182/81 97 %       Vital Signs-Reviewed the patient's vital signs. Pulse Oximetry Analysis, Cardiac Monitor, 12 lead ekg:      Interpreted by the EP.       Records Reviewed: Nursing notes reviewed (Time of Review: 11:14 AM)    Procedures:   Critical Care Time:   Aspirin: (was aspirin given for stroke?)    Diagnosis     Clinical Impression: No diagnosis found.    Disposition: Admitted      Follow-up Information    None          Patient's Medications   Start Taking    No medications on file   Continue Taking    ASPIRIN 81 MG CHEWABLE TABLET    Take 81 mg by mouth daily. DICLOFENAC EC (VOLTAREN) 75 MG EC TABLET    Take 75 mg by mouth two (2) times a day. DILTIAZEM  MG TB24    Take 1 Tab by mouth two (2) times a day. Indications: HYPERTENSION    GABAPENTIN (NEURONTIN) 300 MG CAPSULE    Take 300 mg by mouth three (3) times daily. Indications: NEUROPATHIC PAIN    HYDROCHLOROTHIAZIDE (HYDRODIURIL) 25 MG TABLET    Take 25 mg by mouth daily. Indications: HYPERTENSION    INSULIN ASPART U-100 (NOVOLOG FLEXPEN U-100 INSULIN) 100 UNIT/ML (3 ML) INPN    20 Units by SubCUTAneous route two (2) times a day. LISINOPRIL (PRINIVIL, ZESTRIL) 30 MG TABLET    Take 30 mg by mouth two (2) times a day. METFORMIN (GLUCOPHAGE) 500 MG TABLET    Take 1,000 mg by mouth two (2) times daily (with meals). SIMVASTATIN (ZOCOR) 40 MG TABLET    Take 40 mg by mouth nightly. These Medications have changed    No medications on file   Stop Taking    CYCLOBENZAPRINE (FLEXERIL) 5 MG TABLET    Take 2 Tabs by mouth three (3) times daily (with meals). INSULIN GLARGINE (LANTUS) 100 UNIT/ML INJECTION    53 Units by SubCUTAneous route daily. Indications: DIABETES MELLITUS    NAPROXEN (NAPROSYN) 500 MG TABLET    Take 1 Tab by mouth two (2) times daily (with meals).      _______________________________    Notes:    Arely Francis MD using Dragon dictation      _______________________________

## 2022-03-05 NOTE — ED NOTES
[facility] at bedside preparing patient for transport to [facility] 2S Room 208. Patient belongings packed and placed with patient.

## 2022-03-05 NOTE — Clinical Note
Status[de-identified] INPATIENT [101]   Type of Bed: Medical [8]   Cardiac Monitoring Required?: No   Inpatient Hospitalization Certified Necessary for the Following Reasons: 3.  Patient receiving treatment that can only be provided in an inpatient setting (further clarification in H&P documentation)   Admitting Diagnosis: Cellulitis of finger of right hand [[id number]]   Admitting Physician: Brown Timmons   Attending Physician: Brown Timmons   Estimated Length of Stay: 2 Midnights   Discharge Plan[de-identified] Home with Office Follow-up

## 2022-03-06 ENCOUNTER — ANESTHESIA (OUTPATIENT)
Dept: SURGERY | Age: 57
DRG: 603 | End: 2022-03-06
Payer: COMMERCIAL

## 2022-03-06 LAB
ANION GAP SERPL CALC-SCNC: 5 MMOL/L (ref 3–18)
BUN SERPL-MCNC: 9 MG/DL (ref 7–18)
BUN/CREAT SERPL: 15 (ref 12–20)
CALCIUM SERPL-MCNC: 9.1 MG/DL (ref 8.5–10.1)
CHLORIDE SERPL-SCNC: 104 MMOL/L (ref 100–111)
CO2 SERPL-SCNC: 28 MMOL/L (ref 21–32)
CREAT SERPL-MCNC: 0.59 MG/DL (ref 0.6–1.3)
ERYTHROCYTE [DISTWIDTH] IN BLOOD BY AUTOMATED COUNT: 13.3 % (ref 11.6–14.5)
GLUCOSE BLD STRIP.AUTO-MCNC: 153 MG/DL (ref 70–110)
GLUCOSE BLD STRIP.AUTO-MCNC: 164 MG/DL (ref 70–110)
GLUCOSE BLD STRIP.AUTO-MCNC: 189 MG/DL (ref 70–110)
GLUCOSE BLD STRIP.AUTO-MCNC: 251 MG/DL (ref 70–110)
GLUCOSE BLD STRIP.AUTO-MCNC: 346 MG/DL (ref 70–110)
GLUCOSE SERPL-MCNC: 216 MG/DL (ref 74–99)
HCT VFR BLD AUTO: 36 % (ref 35–45)
HGB BLD-MCNC: 11.3 G/DL (ref 12–16)
MCH RBC QN AUTO: 25.7 PG (ref 24–34)
MCHC RBC AUTO-ENTMCNC: 31.4 G/DL (ref 31–37)
MCV RBC AUTO: 82 FL (ref 78–100)
NRBC # BLD: 0 K/UL (ref 0–0.01)
NRBC BLD-RTO: 0 PER 100 WBC
PLATELET # BLD AUTO: 379 K/UL (ref 135–420)
PMV BLD AUTO: 10.7 FL (ref 9.2–11.8)
POTASSIUM SERPL-SCNC: 3.5 MMOL/L (ref 3.5–5.5)
RBC # BLD AUTO: 4.39 M/UL (ref 4.2–5.3)
SODIUM SERPL-SCNC: 137 MMOL/L (ref 136–145)
WBC # BLD AUTO: 11 K/UL (ref 4.6–13.2)

## 2022-03-06 PROCEDURE — 87205 SMEAR GRAM STAIN: CPT

## 2022-03-06 PROCEDURE — 2709999900 HC NON-CHARGEABLE SUPPLY: Performed by: ORTHOPAEDIC SURGERY

## 2022-03-06 PROCEDURE — 65660000000 HC RM CCU STEPDOWN

## 2022-03-06 PROCEDURE — 74011636637 HC RX REV CODE- 636/637: Performed by: INTERNAL MEDICINE

## 2022-03-06 PROCEDURE — 74011000258 HC RX REV CODE- 258: Performed by: INTERNAL MEDICINE

## 2022-03-06 PROCEDURE — 74011250636 HC RX REV CODE- 250/636: Performed by: PHYSICIAN ASSISTANT

## 2022-03-06 PROCEDURE — 00400 ANES INTEGUMENTARY SYS NOS: CPT | Performed by: NURSE ANESTHETIST, CERTIFIED REGISTERED

## 2022-03-06 PROCEDURE — 74011000250 HC RX REV CODE- 250: Performed by: INTERNAL MEDICINE

## 2022-03-06 PROCEDURE — 85027 COMPLETE CBC AUTOMATED: CPT

## 2022-03-06 PROCEDURE — 76010000138 HC OR TIME 0.5 TO 1 HR: Performed by: ORTHOPAEDIC SURGERY

## 2022-03-06 PROCEDURE — 77030018836 HC SOL IRR NACL ICUM -A: Performed by: ORTHOPAEDIC SURGERY

## 2022-03-06 PROCEDURE — 99252 IP/OBS CONSLTJ NEW/EST SF 35: CPT | Performed by: PHYSICIAN ASSISTANT

## 2022-03-06 PROCEDURE — 74011250637 HC RX REV CODE- 250/637: Performed by: ANESTHESIOLOGY

## 2022-03-06 PROCEDURE — 74011250636 HC RX REV CODE- 250/636: Performed by: ANESTHESIOLOGY

## 2022-03-06 PROCEDURE — 74011250637 HC RX REV CODE- 250/637: Performed by: PHYSICIAN ASSISTANT

## 2022-03-06 PROCEDURE — 74011250636 HC RX REV CODE- 250/636: Performed by: NURSE ANESTHETIST, CERTIFIED REGISTERED

## 2022-03-06 PROCEDURE — 77030011265 HC ELECTRD BLD HEX COVD -A: Performed by: ORTHOPAEDIC SURGERY

## 2022-03-06 PROCEDURE — 87075 CULTR BACTERIA EXCEPT BLOOD: CPT

## 2022-03-06 PROCEDURE — 77030010509 HC AIRWY LMA MSK TELE -A: Performed by: ANESTHESIOLOGY

## 2022-03-06 PROCEDURE — 36415 COLL VENOUS BLD VENIPUNCTURE: CPT

## 2022-03-06 PROCEDURE — 0J9J0ZZ DRAINAGE OF RIGHT HAND SUBCUTANEOUS TISSUE AND FASCIA, OPEN APPROACH: ICD-10-PCS | Performed by: ORTHOPAEDIC SURGERY

## 2022-03-06 PROCEDURE — 76210000006 HC OR PH I REC 0.5 TO 1 HR: Performed by: ORTHOPAEDIC SURGERY

## 2022-03-06 PROCEDURE — 87077 CULTURE AEROBIC IDENTIFY: CPT

## 2022-03-06 PROCEDURE — 74011636637 HC RX REV CODE- 636/637: Performed by: PHYSICIAN ASSISTANT

## 2022-03-06 PROCEDURE — 2709999900 HC NON-CHARGEABLE SUPPLY

## 2022-03-06 PROCEDURE — 00400 ANES INTEGUMENTARY SYS NOS: CPT | Performed by: ANESTHESIOLOGY

## 2022-03-06 PROCEDURE — 74011000250 HC RX REV CODE- 250: Performed by: NURSE ANESTHETIST, CERTIFIED REGISTERED

## 2022-03-06 PROCEDURE — 82962 GLUCOSE BLOOD TEST: CPT

## 2022-03-06 PROCEDURE — 76060000032 HC ANESTHESIA 0.5 TO 1 HR: Performed by: ORTHOPAEDIC SURGERY

## 2022-03-06 PROCEDURE — 80048 BASIC METABOLIC PNL TOTAL CA: CPT

## 2022-03-06 PROCEDURE — 74011000250 HC RX REV CODE- 250: Performed by: PHYSICIAN ASSISTANT

## 2022-03-06 PROCEDURE — 87186 SC STD MICRODIL/AGAR DIL: CPT

## 2022-03-06 PROCEDURE — 99232 SBSQ HOSP IP/OBS MODERATE 35: CPT | Performed by: INTERNAL MEDICINE

## 2022-03-06 RX ORDER — HYDROMORPHONE HYDROCHLORIDE 2 MG/ML
0.5 INJECTION, SOLUTION INTRAMUSCULAR; INTRAVENOUS; SUBCUTANEOUS
Status: DISCONTINUED | OUTPATIENT
Start: 2022-03-06 | End: 2022-03-06 | Stop reason: HOSPADM

## 2022-03-06 RX ORDER — SODIUM CHLORIDE 0.9 % (FLUSH) 0.9 %
5-40 SYRINGE (ML) INJECTION EVERY 8 HOURS
Status: DISCONTINUED | OUTPATIENT
Start: 2022-03-06 | End: 2022-03-06 | Stop reason: HOSPADM

## 2022-03-06 RX ORDER — FAMOTIDINE 20 MG/1
20 TABLET, FILM COATED ORAL ONCE
Status: COMPLETED | OUTPATIENT
Start: 2022-03-06 | End: 2022-03-06

## 2022-03-06 RX ORDER — DIPHENHYDRAMINE HYDROCHLORIDE 50 MG/ML
12.5 INJECTION, SOLUTION INTRAMUSCULAR; INTRAVENOUS
Status: DISCONTINUED | OUTPATIENT
Start: 2022-03-06 | End: 2022-03-06 | Stop reason: HOSPADM

## 2022-03-06 RX ORDER — SODIUM CHLORIDE, SODIUM LACTATE, POTASSIUM CHLORIDE, CALCIUM CHLORIDE 600; 310; 30; 20 MG/100ML; MG/100ML; MG/100ML; MG/100ML
75 INJECTION, SOLUTION INTRAVENOUS CONTINUOUS
Status: DISCONTINUED | OUTPATIENT
Start: 2022-03-06 | End: 2022-03-06 | Stop reason: HOSPADM

## 2022-03-06 RX ORDER — HYDROCODONE BITARTRATE AND ACETAMINOPHEN 5; 325 MG/1; MG/1
1-2 TABLET ORAL
Status: DISCONTINUED | OUTPATIENT
Start: 2022-03-06 | End: 2022-03-08

## 2022-03-06 RX ORDER — LIDOCAINE HYDROCHLORIDE 20 MG/ML
INJECTION, SOLUTION EPIDURAL; INFILTRATION; INTRACAUDAL; PERINEURAL AS NEEDED
Status: DISCONTINUED | OUTPATIENT
Start: 2022-03-06 | End: 2022-03-06 | Stop reason: HOSPADM

## 2022-03-06 RX ORDER — MIDAZOLAM HYDROCHLORIDE 1 MG/ML
INJECTION, SOLUTION INTRAMUSCULAR; INTRAVENOUS AS NEEDED
Status: DISCONTINUED | OUTPATIENT
Start: 2022-03-06 | End: 2022-03-06 | Stop reason: HOSPADM

## 2022-03-06 RX ORDER — SODIUM CHLORIDE, SODIUM LACTATE, POTASSIUM CHLORIDE, CALCIUM CHLORIDE 600; 310; 30; 20 MG/100ML; MG/100ML; MG/100ML; MG/100ML
125 INJECTION, SOLUTION INTRAVENOUS
Status: COMPLETED | OUTPATIENT
Start: 2022-03-06 | End: 2022-03-06

## 2022-03-06 RX ORDER — PROPOFOL 10 MG/ML
INJECTION, EMULSION INTRAVENOUS AS NEEDED
Status: DISCONTINUED | OUTPATIENT
Start: 2022-03-06 | End: 2022-03-06 | Stop reason: HOSPADM

## 2022-03-06 RX ORDER — NALBUPHINE HYDROCHLORIDE 10 MG/ML
5 INJECTION, SOLUTION INTRAMUSCULAR; INTRAVENOUS; SUBCUTANEOUS
Status: DISCONTINUED | OUTPATIENT
Start: 2022-03-06 | End: 2022-03-06 | Stop reason: HOSPADM

## 2022-03-06 RX ORDER — INSULIN LISPRO 100 [IU]/ML
INJECTION, SOLUTION INTRAVENOUS; SUBCUTANEOUS ONCE
Status: DISCONTINUED | OUTPATIENT
Start: 2022-03-06 | End: 2022-03-06 | Stop reason: HOSPADM

## 2022-03-06 RX ORDER — MAGNESIUM SULFATE 100 %
4 CRYSTALS MISCELLANEOUS AS NEEDED
Status: DISCONTINUED | OUTPATIENT
Start: 2022-03-06 | End: 2022-03-06 | Stop reason: HOSPADM

## 2022-03-06 RX ORDER — INSULIN GLARGINE 100 [IU]/ML
20 INJECTION, SOLUTION SUBCUTANEOUS
Status: DISCONTINUED | OUTPATIENT
Start: 2022-03-06 | End: 2022-03-08

## 2022-03-06 RX ORDER — ONDANSETRON 2 MG/ML
4 INJECTION INTRAMUSCULAR; INTRAVENOUS ONCE
Status: COMPLETED | OUTPATIENT
Start: 2022-03-06 | End: 2022-03-06

## 2022-03-06 RX ORDER — ONDANSETRON 2 MG/ML
INJECTION INTRAMUSCULAR; INTRAVENOUS AS NEEDED
Status: DISCONTINUED | OUTPATIENT
Start: 2022-03-06 | End: 2022-03-06 | Stop reason: HOSPADM

## 2022-03-06 RX ORDER — SODIUM CHLORIDE 0.9 % (FLUSH) 0.9 %
5-40 SYRINGE (ML) INJECTION AS NEEDED
Status: DISCONTINUED | OUTPATIENT
Start: 2022-03-06 | End: 2022-03-06 | Stop reason: HOSPADM

## 2022-03-06 RX ADMIN — CLINDAMYCIN 300 MG: 150 INJECTION, SOLUTION INTRAMUSCULAR; INTRAVENOUS at 22:50

## 2022-03-06 RX ADMIN — MORPHINE SULFATE 2 MG: 2 INJECTION, SOLUTION INTRAMUSCULAR; INTRAVENOUS at 04:31

## 2022-03-06 RX ADMIN — MORPHINE SULFATE 2 MG: 2 INJECTION, SOLUTION INTRAMUSCULAR; INTRAVENOUS at 00:55

## 2022-03-06 RX ADMIN — FAMOTIDINE 20 MG: 20 TABLET, FILM COATED ORAL at 11:06

## 2022-03-06 RX ADMIN — Medication 3 UNITS: at 16:13

## 2022-03-06 RX ADMIN — GABAPENTIN 900 MG: 300 CAPSULE ORAL at 22:50

## 2022-03-06 RX ADMIN — Medication 12 UNITS: at 22:30

## 2022-03-06 RX ADMIN — LISINOPRIL 40 MG: 20 TABLET ORAL at 16:28

## 2022-03-06 RX ADMIN — HYDROMORPHONE HYDROCHLORIDE 0.5 MG: 2 INJECTION, SOLUTION INTRAMUSCULAR; INTRAVENOUS; SUBCUTANEOUS at 13:14

## 2022-03-06 RX ADMIN — PROPOFOL 200 MG: 10 INJECTION, EMULSION INTRAVENOUS at 12:32

## 2022-03-06 RX ADMIN — MIDAZOLAM HYDROCHLORIDE 2 MG: 2 INJECTION, SOLUTION INTRAMUSCULAR; INTRAVENOUS at 12:28

## 2022-03-06 RX ADMIN — DILTIAZEM HYDROCHLORIDE 240 MG: 240 CAPSULE, COATED, EXTENDED RELEASE ORAL at 16:28

## 2022-03-06 RX ADMIN — GABAPENTIN 900 MG: 300 CAPSULE ORAL at 16:29

## 2022-03-06 RX ADMIN — WATER 1 G: 1 INJECTION INTRAMUSCULAR; INTRAVENOUS; SUBCUTANEOUS at 16:29

## 2022-03-06 RX ADMIN — HYDROMORPHONE HYDROCHLORIDE 0.5 MG: 2 INJECTION, SOLUTION INTRAMUSCULAR; INTRAVENOUS; SUBCUTANEOUS at 13:20

## 2022-03-06 RX ADMIN — LIDOCAINE HYDROCHLORIDE 20 MG: 20 INJECTION, SOLUTION EPIDURAL; INFILTRATION; INTRACAUDAL; PERINEURAL at 12:32

## 2022-03-06 RX ADMIN — INSULIN GLARGINE 20 UNITS: 100 INJECTION, SOLUTION SUBCUTANEOUS at 22:30

## 2022-03-06 RX ADMIN — HYDROMORPHONE HYDROCHLORIDE 0.5 MG: 2 INJECTION, SOLUTION INTRAMUSCULAR; INTRAVENOUS; SUBCUTANEOUS at 13:25

## 2022-03-06 RX ADMIN — Medication 5 UNITS: at 08:35

## 2022-03-06 RX ADMIN — CLINDAMYCIN 300 MG: 150 INJECTION, SOLUTION INTRAMUSCULAR; INTRAVENOUS at 14:00

## 2022-03-06 RX ADMIN — ASPIRIN 81 MG 81 MG: 81 TABLET ORAL at 16:28

## 2022-03-06 RX ADMIN — HYDROCODONE BITARTRATE AND ACETAMINOPHEN 1 TABLET: 5; 325 TABLET ORAL at 13:38

## 2022-03-06 RX ADMIN — FENTANYL CITRATE 50 MCG: 50 INJECTION, SOLUTION INTRAMUSCULAR; INTRAVENOUS at 12:37

## 2022-03-06 RX ADMIN — HYDROCHLOROTHIAZIDE 25 MG: 25 TABLET ORAL at 16:28

## 2022-03-06 RX ADMIN — VANCOMYCIN HYDROCHLORIDE 1000 MG: 1 INJECTION, POWDER, LYOPHILIZED, FOR SOLUTION INTRAVENOUS at 09:52

## 2022-03-06 RX ADMIN — SODIUM CHLORIDE, PRESERVATIVE FREE 10 ML: 5 INJECTION INTRAVENOUS at 16:30

## 2022-03-06 RX ADMIN — Medication 3 UNITS: at 11:13

## 2022-03-06 RX ADMIN — SODIUM CHLORIDE, POTASSIUM CHLORIDE, SODIUM LACTATE AND CALCIUM CHLORIDE 125 ML/HR: 600; 310; 30; 20 INJECTION, SOLUTION INTRAVENOUS at 10:49

## 2022-03-06 RX ADMIN — FENTANYL CITRATE 50 MCG: 50 INJECTION, SOLUTION INTRAMUSCULAR; INTRAVENOUS at 12:33

## 2022-03-06 RX ADMIN — SODIUM CHLORIDE, PRESERVATIVE FREE 10 ML: 5 INJECTION INTRAVENOUS at 10:49

## 2022-03-06 RX ADMIN — ONDANSETRON 4 MG: 2 INJECTION INTRAMUSCULAR; INTRAVENOUS at 13:14

## 2022-03-06 RX ADMIN — MORPHINE SULFATE 2 MG: 2 INJECTION, SOLUTION INTRAMUSCULAR; INTRAVENOUS at 22:55

## 2022-03-06 RX ADMIN — ATORVASTATIN CALCIUM 20 MG: 20 TABLET, FILM COATED ORAL at 22:49

## 2022-03-06 RX ADMIN — ONDANSETRON 4 MG: 2 INJECTION INTRAMUSCULAR; INTRAVENOUS at 12:32

## 2022-03-06 RX ADMIN — MORPHINE SULFATE 2 MG: 2 INJECTION, SOLUTION INTRAMUSCULAR; INTRAVENOUS at 16:29

## 2022-03-06 RX ADMIN — SODIUM CHLORIDE, PRESERVATIVE FREE 10 ML: 5 INJECTION INTRAVENOUS at 22:55

## 2022-03-06 NOTE — OP NOTES
Operative Report    Patient: Fermin Amin MRN: [id number]  SSN: [id number]    YOB: 1965  Age: 55 y.o. Sex: female       Date of Surgery: 3/6/2022     Preoperative Diagnosis: infected right ring finger     Postoperative Diagnosis: Deep abscess dorsal aspect right ring finger    Surgeon(s) and Role:     Shae Strickland MD - Primary    Anesthesia: MAC     Procedure: Procedure(s):  INCISION AND DRAINAGE RIGHT RING FINGER     Procedure in Detail: Patient was taken to the operating room to some general trach anesthesia anesthesia staff. Placed supine where the right arm was prepped with ChloraPrep solution draped free sterile field. A dorsal incision over the proximal phalanx of the right ring finger area that was fluctuant was made subcutaneous tissues dissected bluntly to the level of the peritenon expressing purulent substance that went deep into the ulnar aspect but not involving bony tissue or joint. The PIP joint was not violated and the extensor tendons were intact. No evidence of communication to the volar aspect of the finger. Cultures were obtained aerobic and anaerobic and then profuse irrigation was used. The area was packed with quarter inch Nu Gauze with Betadine and then the incision was loosely closed with 5-0 nylon. Sterile dressings were applied patient tolerated procedure well was taken to recovery room without problems      Estimated Blood Loss: Minimal    Tourniquet Time: * No tourniquets in log *      Implants: * No implants in log *            Specimens:   ID Type Source Tests Collected by Time Destination   1 : WOUND RIGHT HAND Wound Hand, Right CULTURE, ANAEROBIC, CULTURE, WOUND W Juve Nguyen MD 3/6/2022 1246 Microbiology           Drains: None                Complications: None    Counts: Sponge and needle counts were correct times two.     Signed By:  Elise Benites MD     March 6, 2022

## 2022-03-06 NOTE — PROGRESS NOTES
[address] [facility] Pharmacokinetic Monitoring Service - Vancomycin     Nicole Laws is a 55 y.o. female starting on vancomycin therapy for skin and soft tissue. Pharmacy consulted by GREGORY Pate for monitoring and adjustment. Target Concentration: Goal trough of 10-15 mg/L and AUC/LORENZO <500 mg*hr/L    Additional Antimicrobials: ceftriaxone    Pertinent Laboratory Values: Wt Readings from Last 1 Encounters:   03/05/22 103 kg (227 lb)     Temp Readings from Last 1 Encounters:   03/05/22 98.4 °F (36.9 °C)     No components found for: PROCAL  Estimated Creatinine Clearance: 102.2 mL/min (by C-G formula based on SCr of 0.65 mg/dL). Recent Labs     03/05/22  1120   WBC 10.4       Pertinent Cultures:  Culture Date Source Results   3/5/22  3/5/22 Blood x 2 In process   MRSA Nasal Swab: was ordered by provider, awaiting results. .    Plan:  Dosing recommendations based on Bayesian software  After the loading dose is complete, start vancomycin 1000 mg IV q12 hours  Anticipated AUC of 487 and trough concentration of 13.9 at steady state  Renal labs as indicated   Vancomycin concentration not ordered yet  Pharmacy will continue to monitor patient and adjust therapy as indicated    Thank you for the consult,  LYLA Bay  3/5/2022 10:01 PM

## 2022-03-06 NOTE — CONSULTS
[facility]    Consult Note    Patient: Nakia Nelson               Sex: female          DOA: 3/5/2022         YOB: 1965      Age:  55 y.o.        LOS:  LOS: 1 day              HPI:     Nakia Nelson is a 55 y.o. female who has been seen for right ring finger pain. Patient states that this started yesterday. She does have a small abrasion on her hand that she is unsure where that came from. The pain is worsening and she feels like the swelling is worsening. The pain was so bad she cannot even bend her finger. Denies drug use. History of a stroke, diabetes, and hypertension    Past Medical History:   Diagnosis Date    Arthritis     Diabetes ([facility] [address]     Hypertension     Morbid obesity ([facility] [address]     BMI-50    Psychiatric disorder     CLAUSTROPHOBIA    Stroke ([facility] [address] 2006    no residuals    Unspecified sleep apnea     uses cpap    Vitamin D deficiency        Past Surgical History:   Procedure Laterality Date    COLONOSCOPY N/A 10/7/2016    COLONOSCOPY aborted poor prep performed by Shimon Brandon MD at [address] COLONOSCOPY N/A 10/21/2016    COLONOSCOPY performed by Destiney Duong MD at [address] HX HYSTERECTOMY  2006    HX KNEE ARTHROSCOPY  2005 AND 2012    RIGHT KNEE IN 2005, LEFT KNEE IN 2012    FL BREAST SURGERY PROCEDURE UNLISTED  1970'S    LEFT BREAST BX X2     US GUIDED CORE BREAST BIOPSY      LT. br. 2016 benign fat necrosis       Family History   Problem Relation Age of Onset    Cancer Maternal Grandmother        Social History     Socioeconomic History    Marital status:    Tobacco Use    Smoking status: Never Smoker    Smokeless tobacco: Never Used   Substance and Sexual Activity    Alcohol use: No    Drug use: No       Prior to Admission medications    Medication Sig Start Date End Date Taking?  Authorizing Provider   insulin aspart U-100 (NovoLOG Flexpen U-100 Insulin) 100 unit/mL (3 mL) inpn 20 Units by SubCUTAneous route two (2) times a day. Yes Other, MD [place]   simvastatin (ZOCOR) 40 mg tablet Take 40 mg by mouth nightly. Yes Provider, Historical   diclofenac EC (VOLTAREN) 75 mg EC tablet Take 75 mg by mouth two (2) times a day. Yes Provider, Historical   metFORMIN (GLUCOPHAGE) 500 mg tablet Take 1,000 mg by mouth two (2) times daily (with meals). Yes Provider, Historical   gabapentin (NEURONTIN) 300 mg capsule Take 900 mg by mouth three (3) times daily. Indications: neuropathic pain   Yes Provider, Historical   aspirin 81 mg chewable tablet Take 81 mg by mouth daily. Yes Provider, Historical   lisinopril (PRINIVIL, ZESTRIL) 30 mg tablet Take 30 mg by mouth two (2) times a day. Yes Provider, Historical   diltiazem hcl 240 mg Tb24 Take 1 Tab by mouth two (2) times a day. Indications: HYPERTENSION   Yes Provider, Historical   hydrochlorothiazide (HYDRODIURIL) 25 mg tablet Take 25 mg by mouth daily. Indications: HYPERTENSION   Yes Provider, Historical       No Known Allergies    Review of Systems  Negative for any fever, chills, sweats, headache, blurred vision, cough, congestion, SOB, abdominal pain, bleeding, bruising, numbness, or tingling. 12 point ROS otherwise negative other than noted in the HPI      Physical Exam:      Visit Vitals  /64   Pulse 73   Temp 98.1 °F (36.7 °C)   Resp 18   Ht 5' 2\" (1.575 m)   Wt 227 lb (103 kg)   SpO2 97%   Breastfeeding No   BMI 41.52 kg/m²       Physical Exam:  General: Well developed, well nourished, 55 y.o. female in  NAD   Vitals: Blood pressure 130/64, pulse 73, temperature 98.1 °F (36.7 °C), resp. rate 18, height 5' 2\" (1.575 m), weight 227 lb (103 kg), SpO2 97 %, not currently breastfeeding. Skin: No rashs, ulcers, icterus, or other obvious lesions/ lacerations  Eyes: EOM intact, PERRLA   ENM: Without obvious lesions. Nares patent. Moist mucous membranes. Neck: Supple, FROM   Lungs: CTAB   Heart: RRR, normal S1, S2.  No murmurs, rubs, or gallops  Abdomen: Soft, NT, bowel sounds present all 4 quadrants   Musculoskeletal: Inspection of the right hand reveals diffuse swelling to the dorsum of the right ring finger small abrasion noted on the right dorsal aspects between the DIP and the IP joint. Unable to flex or extend the finger secondary to pain. Erythema tracking up into the hand  Neuro: AAOx3. Without obvious deficits     IMAGIN v xray of right 4th finger read and reviewed by myself: Edema or inflammation at the base of the fourth digit. No soft tissue gas or  bony abnormality. Labs Reviewed:  BMP:   Lab Results   Component Value Date/Time     2022 01:09 AM    K 3.5 2022 01:09 AM     2022 01:09 AM    CO2 28 2022 01:09 AM    AGAP 5 2022 01:09 AM     (H) 2022 01:09 AM    BUN 9 2022 01:09 AM    CREA 0.59 (L) 2022 01:09 AM    GFRAA >60 2022 01:09 AM    GFRNA >60 2022 01:09 AM     CBC:   Lab Results   Component Value Date/Time    WBC 11.0 2022 01:09 AM    HGB 11.3 (L) 2022 01:09 AM    HCT 36.0 2022 01:09 AM     2022 01:09 AM       Assessment/Plan   [unfilled]  Principal Problem:    Cellulitis of finger of right hand (3/5/2022)    Active Problems:    HTN (hypertension) (3/5/2022)      DM (diabetes mellitus) ([facility] [address] (3/5/2022)        Pt. Stable  Pt. NPO x meds   Consent Pt. For I&D right index finger  I discussed the risks and benefits and potential adverse outcomes of both operative vs non operative treatment of abscess of right index finger with the patient. Risks of operative intervention include but not limited to bleeding, infection, deep vein thrombosis, pulmonary embolism, death, limb length discrepancy, reflexive sympathetic dystrophy, fat embolism syndrome,damage to blood vessels and nerves, malunion, non-union, delayed union, failure of hardware, post traumatic arthritis, stroke, heart attack, and death.       Patient understands that infection may arise and may require numerous surgeries. Medical consultation for Pre-/post-operative medical care.     Keiko Pedersen PA-C   [facility]   [phone]

## 2022-03-06 NOTE — BRIEF OP NOTE
Brief Postoperative Note    Patient: Fermin Amin  YOB: 1965  MRN: [id number]    Date of Procedure: 3/6/2022     Pre-Op Diagnosis: infected right ring finger    Post-Op Diagnosis: Abscess dorsal aspect ring finger deep    Procedure(s):  INCISION AND DRAINAGE RIGHT RING FINGER    Surgeon(s):  Dung Cisneros MD    Surgical Assistant: Surg Asst-1: Jennifer Haynes    Anesthesia: MAC     Estimated Blood Loss (mL): Minimal    Complications: None    Specimens:   ID Type Source Tests Collected by Time Destination   1 : WOUND RIGHT HAND Wound Hand, Right CULTURE, ANAEROBIC, CULTURE, WOUND W Juve Nguyen MD 3/6/2022 1246 Microbiology        Implants: * No implants in log *    Drains: * No LDAs found *    Findings: Dorsal abscess proximal phalanx right ring finger deep    Electronically Signed by Elise Benites MD on 3/6/2022 at 12:54 PM

## 2022-03-06 NOTE — ANESTHESIA PREPROCEDURE EVALUATION
Relevant Problems   No relevant active problems       Anesthetic History   No history of anesthetic complications            Review of Systems / Medical History  Patient summary reviewed and pertinent labs reviewed    Pulmonary        Sleep apnea: CPAP           Neuro/Psych       CVA: no residual symptoms  TIA and psychiatric history     Cardiovascular    Hypertension: well controlled              Exercise tolerance: >4 METS     GI/Hepatic/Renal                Endo/Other    Diabetes: poorly controlled, type 2    Morbid obesity and arthritis     Other Findings              Physical Exam    Airway  Mallampati: III  TM Distance: 4 - 6 cm  Neck ROM: decreased range of motion   Mouth opening: Normal     Cardiovascular    Rhythm: regular  Rate: normal         Dental  No notable dental hx       Pulmonary  Breath sounds clear to auscultation               Abdominal  GI exam deferred       Other Findings            Anesthetic Plan    ASA: 3  Anesthesia type: general          Induction: Intravenous  Anesthetic plan and risks discussed with: Patient

## 2022-03-06 NOTE — PROGRESS NOTES
Hospitalist Progress Note    Patient: Nigel Perez Age: 55 y.o. : 1965 MR#: [id number] SSN: [id number]  Date/Time: 3/6/2022 8:55 AM    DOA: 3/5/2022  PCP: Marizol Espinal MD    Subjective:     No fever. No leukocytosis  She still has significant pain in her finger, extending to her hand. Pain is moderately controlled   Ortho to take her for I&D today. Interval Hospital Course:  55 y.o female with HTN, h/o CVA, obesity,  Type 2 DM, presented to [facility] with right ring finger pain and swelling that has been worsened over the last day. 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 In the ER, xray of hand was without gas. She received IV zosyn while in [facility].         ROS:  No current fever/chills, no headache, no dizziness, no facial pain, no sinus congestion,   No swallowing pain, No chest pain, no palpitation, no shortness of breath, no abd pain,  No diarrhea, no urinary complaint, no leg pain or swelling, +right hand pain      Assessment/Plan:     1. Cellulitis of finger of right hand with underlying abscess   2. HTN   3. Hyperglycemia with Type 2 DM, HgbA1c 10.9%  4. Morbid obesity BMI 41   5. Hyperlipidemia   6. Diabetic neuropathy     Cont Vancomycin, ceftriaxone, add clindamycin for toxic coverage due to rapidity of her symptom.    To OR for I&D today per orthopaedic  Intraoperative culture follow up  Resume her lantus and ISS   Resume home medications as tolerated    Wound care  ICS    Full code   Disposition planning: home in 1-2 days   Family updated (.NONE.)  Additional Notes:    Time spent > 35 minutes    Case discussed with:  [x]Patient  []Family  [x]Nursing  []Case Management  DVT Prophylaxis:  []Lovenox  []Hep SQ  []SCDs  []Coumadin   []On Heparin gtt    Signed By: Arianne Stovall MD     2022 8:55 AM              Objective:   VS:   Visit Vitals  BP (!) 141/80 (BP 1 Location: Right upper arm, BP Patient Position: At rest)   Pulse 76   Temp 98.2 °F (36.8 °C)   Resp 18   Ht 5' 2\" (1.575 m)   Wt 103 kg (227 lb)   SpO2 97%   Breastfeeding No   BMI 41.52 kg/m²      Tmax/24hrs: Temp (24hrs), Av.3 °F (36.8 °C), Min:98.1 °F (36.7 °C), Max:98.7 °F (37.1 °C)  No intake or output data in the 24 hours ending 22 0855    Tele:   General:  Cooperative, Not in acute distress, speaks in full sentence while in bed  HEENT: PERRL, EOMI, supple neck, no JVD, dry oral mucosa  Cardiovascular: S1S2 regular, no rub/gallop   Pulmonary: air entry bilaterally, no wheezing, no crackle  GI:  Soft, non tender, non distended, +bs, no guarding   Extremities:  No pedal edema, +distal pulses appreciated   Right ring finger/hand infection, indolent, ?abscess    Neuro: AOx3, moving all extremities, no gross deficit.      Additional:       Current Facility-Administered Medications   Medication Dose Route Frequency    HYDROcodone-acetaminophen (NORCO) 5-325 mg per tablet 1-2 Tablet  1-2 Tablet Oral Q4H PRN    sodium chloride (NS) flush 5-10 mL  5-10 mL IntraVENous PRN    sodium chloride (NS) flush 5-40 mL  5-40 mL IntraVENous Q8H    sodium chloride (NS) flush 5-40 mL  5-40 mL IntraVENous PRN    cefTRIAXone (ROCEPHIN) 1 g in sterile water (preservative free) 10 mL IV syringe  1 g IntraVENous Q24H    acetaminophen (TYLENOL) tablet 650 mg  650 mg Oral Q4H PRN    [Held by provider] heparin (porcine) injection 5,000 Units  5,000 Units SubCUTAneous Q8H    aspirin chewable tablet 81 mg  81 mg Oral DAILY    gabapentin (NEURONTIN) capsule 900 mg  900 mg Oral TID    hydroCHLOROthiazide (HYDRODIURIL) tablet 25 mg  25 mg Oral DAILY    atorvastatin (LIPITOR) tablet 20 mg  20 mg Oral QHS    insulin lispro (HUMALOG) injection   SubCUTAneous AC&HS    glucose chewable tablet 16 g  4 Tablet Oral PRN    glucagon (GLUCAGEN) injection 1 mg  1 mg IntraMUSCular PRN    dextrose 10% infusion 0-250 mL  0-250 mL IntraVENous PRN    morphine injection 2 mg  2 mg IntraVENous Q3H PRN    dilTIAZem ER (CARDIZEM CD) capsule 240 mg 240 mg Oral DAILY    lisinopriL (PRINIVIL, ZESTRIL) tablet 40 mg  40 mg Oral DAILY    vancomycin (VANCOCIN) 1,000 mg in 0.9% sodium chloride 250 mL (VIAL-MATE)  1,000 mg IntraVENous Q12H            Lab/Data Review:  Labs: Results:       Chemistry Recent Labs     03/06/22  0109 03/05/22  1120   * 302*    135*   K 3.5 3.7    100   CO2 28 30   BUN 9 11   CREA 0.59* 0.65   BUCR 15 17   AGAP 5 5   CA 9.1 9.1     Recent Labs     03/05/22  1120   ALT 18   TP 7.8   ALB 3.4   GLOB 4.4*   AGRAT 0.8      CBC w/Diff Recent Labs     03/06/22  0109 03/05/22  1120 03/05/22  1120   WBC 11.0  --  10.4   RBC 4.39  --  4.42   HGB 11.3*  --  12.0   HCT 36.0  --  35.8   MCV 82.0   < > 81.0   MCH 25.7   < > 27.1   MCHC 31.4   < > 33.5   RDW 13.3   < > 13.2     --  340   GRANS  --   --  69   LYMPH  --   --  24   EOS  --   --  1    < > = values in this interval not displayed. Coagulation No results for input(s): PTP, INR, APTT, INREXT in the last 72 hours.     Iron/Ferritin Lab Results   Component Value Date/Time    Iron 43 06/07/2010 07:55 AM    TIBC 294 06/07/2010 07:55 AM    Iron % saturation 15 (L) 06/07/2010 07:55 AM    Ferritin 73 06/07/2010 07:55 AM       BNP    Cardiac Enzymes Lab Results   Component Value Date/Time     10/30/2017 08:23 PM    CK - MB <1.0 10/30/2017 08:23 PM    CK-MB Index  10/30/2017 08:23 PM     CALCULATION NOT PERFORMED WHEN RESULT IS BELOW LINEAR LIMIT    Troponin-I, QT <0.02 10/30/2017 08:23 PM        Lactic Acid    Thyroid Studies          All Micro Results     Procedure Component Value Units Date/Time    CULTURE, BLOOD [[id number]] Collected: 03/05/22 1135    Order Status: Completed Specimen: Blood Updated: 03/06/22 0604     Special Requests: NO SPECIAL REQUESTS        Culture result: NO GROWTH AFTER 15 HOURS       CULTURE, BLOOD [[id number]] Collected: 03/05/22 1120    Order Status: Completed Specimen: Blood Updated: 03/06/22 0604     Special Requests: NO SPECIAL REQUESTS Culture result: NO GROWTH AFTER 15 HOURS       CULTURE, MRSA [[id number]]     Order Status: Sent Specimen: Nasal     COVID-19 RAPID TEST [[id number]] Collected: 03/05/22 1415    Order Status: Completed Specimen: Nasopharyngeal Updated: 03/05/22 1541     Specimen source Nasopharyngeal        COVID-19 rapid test Not detected        Comment: Rapid Abbott ID Now       Rapid NAAT:  The specimen is NEGATIVE for SARS-CoV-2, the novel coronavirus associated with COVID-19. Negative results should be treated as presumptive and, if inconsistent with clinical signs and symptoms or necessary for patient management, should be tested with an alternative molecular assay. Negative results do not preclude SARS-CoV-2 infection and should not be used as the sole basis for patient management decisions. This test has been authorized by the FDA under an Emergency Use Authorization (EUA) for use by authorized laboratories. Fact sheet for Healthcare Providers: [employer]nz  Fact sheet for Patients: [employer]       Methodology: Isothermal Nucleic Acid Amplification         COVID-19 RAPID TEST [[id number]]     Order Status: Canceled             Images:    CT (Most Recent). XRAY (Most Recent) XR Results (most recent):  Results from Hospital Encounter encounter on 03/05/22    XR 4TH FINGER RT MIN 2 V    Narrative  Right fourth digit radiographs    HISTORY: Redness and swelling right hand upon waking, pain and difficulty  bending fingers, extensive redness and swelling fourth digit. COMPARISON: None. FINDINGS:    Three views obtained. There is no fracture or dislocation. The joint spaces  are maintained. Mineralization is normal.   There is edema or inflammation at  the base of the fourth digit. No soft tissue gas. There is no radiopaque  foreign body. Impression  Edema or inflammation at the base of the fourth digit.  No soft tissue gas or  bony abnormality. EKG No results found for this or any previous visit.      2D ECHO

## 2022-03-06 NOTE — ANESTHESIA POSTPROCEDURE EVALUATION
Procedure(s):  INCISION AND DRAINAGE RIGHT RING FINGER. general - backup, general    Anesthesia Post Evaluation      Multimodal analgesia: multimodal analgesia used between 6 hours prior to anesthesia start to PACU discharge  Patient location during evaluation: bedside  Patient participation: complete - patient participated  Level of consciousness: awake  Pain score: 1  Pain management: adequate  Airway patency: patent  Anesthetic complications: no  Cardiovascular status: stable  Respiratory status: acceptable  Hydration status: acceptable  Post anesthesia nausea and vomiting:  controlled  Final Post Anesthesia Temperature Assessment:  Normothermia (36.0-37.5 degrees C)      INITIAL Post-op Vital signs:   Vitals Value Taken Time   /66 03/06/22 1310   Temp 36.6 °C (97.9 °F) 03/06/22 1310   Pulse 77 03/06/22 1310   Resp 14 03/06/22 1310   SpO2 100 % 03/06/22 1310   Vitals shown include unvalidated device data.

## 2022-03-06 NOTE — PROGRESS NOTES
Date of Surgery Update:  Елена Linn was seen and examined. History and physical has been reviewed. The patient has been examined. There have been no significant clinical changes since the completion of the originally dated History and Physical.    Signed By: Greyson Briscoe MD     March 6, 2022 11:43 AM       The above patient was independently seen and examined by myself. The case was then discussed and a proper diagnosis/plan was made. I agree with the above assessment.

## 2022-03-06 NOTE — PROGRESS NOTES
Patient is NPO for surgery Blood Glucose level 251. MD stated give 5 units of insulin instead of sliding scale.

## 2022-03-06 NOTE — PROGRESS NOTES
[address] [facility] Pharmacokinetic Monitoring Service - Vancomycin    Indication: SSTI  Target Concentration: Goal AUC/LORENZO 400-600 mg*hr/L  Day of Therapy: 2  Additional Antimicrobials: ceftriaxone    Pertinent Laboratory Values: Wt Readings from Last 1 Encounters:   03/05/22 103 kg (227 lb)     Temp Readings from Last 1 Encounters:   03/06/22 98.2 °F (36.8 °C)     No components found for: PROCAL  Estimated Creatinine Clearance: 102.2 mL/min (A) (by C-G formula based on SCr of 0.59 mg/dL (L)). Recent Labs     03/06/22  0109 03/05/22  1120   WBC 11.0 10.4       Pertinent Cultures:  Culture Date Source Results   3/5 blood x 2 ngtd   MRSA Nasal Swab: N/A. Non-respiratory infection. Uma Vizcaino     [unfilled]    Assessment:  Date/Time Current Dose Concentration Timing of Concentration (h) AUC          Note: Serum concentrations collected for AUC dosing may appear elevated if collected in close proximity to the dose administered, this is not necessarily an indication of toxicity    Plan:  Continue current regimen of 1000 mg q12  Repeat vancomycin concentration ordered for AM labs tomorrow   Pharmacy will continue to monitor patient and adjust therapy as indicated    Thank you for the consult,  LYLA Lamar  3/6/2022

## 2022-03-06 NOTE — ROUTINE PROCESS
Patient is alert, medicated for right hand pain. Patient is NPO for the OR, later this morning. No distress noted.

## 2022-03-07 LAB
ANION GAP SERPL CALC-SCNC: 5 MMOL/L (ref 3–18)
BUN SERPL-MCNC: 8 MG/DL (ref 7–18)
BUN/CREAT SERPL: 11 (ref 12–20)
CALCIUM SERPL-MCNC: 9.2 MG/DL (ref 8.5–10.1)
CHLORIDE SERPL-SCNC: 102 MMOL/L (ref 100–111)
CO2 SERPL-SCNC: 28 MMOL/L (ref 21–32)
CREAT SERPL-MCNC: 0.75 MG/DL (ref 0.6–1.3)
ERYTHROCYTE [DISTWIDTH] IN BLOOD BY AUTOMATED COUNT: 13.3 % (ref 11.6–14.5)
FERRITIN SERPL-MCNC: 109 NG/ML (ref 8–388)
FOLATE SERPL-MCNC: >20 NG/ML (ref 3.1–17.5)
GLUCOSE BLD STRIP.AUTO-MCNC: 197 MG/DL (ref 70–110)
GLUCOSE BLD STRIP.AUTO-MCNC: 288 MG/DL (ref 70–110)
GLUCOSE BLD STRIP.AUTO-MCNC: 296 MG/DL (ref 70–110)
GLUCOSE BLD STRIP.AUTO-MCNC: 310 MG/DL (ref 70–110)
GLUCOSE SERPL-MCNC: 315 MG/DL (ref 74–99)
HCT VFR BLD AUTO: 32.9 % (ref 35–45)
HGB BLD-MCNC: 10.7 G/DL (ref 12–16)
IRON SATN MFR SERPL: 10 % (ref 20–50)
IRON SERPL-MCNC: 23 UG/DL (ref 50–175)
MCH RBC QN AUTO: 26.8 PG (ref 24–34)
MCHC RBC AUTO-ENTMCNC: 32.5 G/DL (ref 31–37)
MCV RBC AUTO: 82.5 FL (ref 78–100)
NRBC # BLD: 0 K/UL (ref 0–0.01)
NRBC BLD-RTO: 0 PER 100 WBC
PLATELET # BLD AUTO: 342 K/UL (ref 135–420)
PMV BLD AUTO: 10.6 FL (ref 9.2–11.8)
POTASSIUM SERPL-SCNC: 3.5 MMOL/L (ref 3.5–5.5)
RBC # BLD AUTO: 3.99 M/UL (ref 4.2–5.3)
SODIUM SERPL-SCNC: 135 MMOL/L (ref 136–145)
TIBC SERPL-MCNC: 230 UG/DL (ref 250–450)
VANCOMYCIN SERPL-MCNC: 20.9 UG/ML (ref 5–40)
VIT B12 SERPL-MCNC: 359 PG/ML (ref 211–911)
WBC # BLD AUTO: 11.9 K/UL (ref 4.6–13.2)

## 2022-03-07 PROCEDURE — 99232 SBSQ HOSP IP/OBS MODERATE 35: CPT | Performed by: INTERNAL MEDICINE

## 2022-03-07 PROCEDURE — 97165 OT EVAL LOW COMPLEX 30 MIN: CPT

## 2022-03-07 PROCEDURE — 65660000000 HC RM CCU STEPDOWN

## 2022-03-07 PROCEDURE — 74011250637 HC RX REV CODE- 250/637: Performed by: PHYSICIAN ASSISTANT

## 2022-03-07 PROCEDURE — 83540 ASSAY OF IRON: CPT

## 2022-03-07 PROCEDURE — 85027 COMPLETE CBC AUTOMATED: CPT

## 2022-03-07 PROCEDURE — 74011000258 HC RX REV CODE- 258: Performed by: INTERNAL MEDICINE

## 2022-03-07 PROCEDURE — 36415 COLL VENOUS BLD VENIPUNCTURE: CPT

## 2022-03-07 PROCEDURE — 97110 THERAPEUTIC EXERCISES: CPT

## 2022-03-07 PROCEDURE — 80202 ASSAY OF VANCOMYCIN: CPT

## 2022-03-07 PROCEDURE — 74011000250 HC RX REV CODE- 250: Performed by: INTERNAL MEDICINE

## 2022-03-07 PROCEDURE — 74011636637 HC RX REV CODE- 636/637: Performed by: INTERNAL MEDICINE

## 2022-03-07 PROCEDURE — 74011250636 HC RX REV CODE- 250/636: Performed by: INTERNAL MEDICINE

## 2022-03-07 PROCEDURE — 82607 VITAMIN B-12: CPT

## 2022-03-07 PROCEDURE — 80048 BASIC METABOLIC PNL TOTAL CA: CPT

## 2022-03-07 PROCEDURE — 74011250637 HC RX REV CODE- 250/637: Performed by: INTERNAL MEDICINE

## 2022-03-07 PROCEDURE — 82728 ASSAY OF FERRITIN: CPT

## 2022-03-07 PROCEDURE — 2709999900 HC NON-CHARGEABLE SUPPLY

## 2022-03-07 PROCEDURE — 74011250636 HC RX REV CODE- 250/636: Performed by: PHYSICIAN ASSISTANT

## 2022-03-07 PROCEDURE — 82962 GLUCOSE BLOOD TEST: CPT

## 2022-03-07 PROCEDURE — 74011000250 HC RX REV CODE- 250: Performed by: PHYSICIAN ASSISTANT

## 2022-03-07 PROCEDURE — 74011636637 HC RX REV CODE- 636/637: Performed by: PHYSICIAN ASSISTANT

## 2022-03-07 RX ORDER — CLINDAMYCIN HYDROCHLORIDE 150 MG/1
300 CAPSULE ORAL EVERY 8 HOURS
Status: DISCONTINUED | OUTPATIENT
Start: 2022-03-07 | End: 2022-03-08

## 2022-03-07 RX ORDER — VANCOMYCIN/0.9 % SOD CHLORIDE 1.5G/250ML
1500 PLASTIC BAG, INJECTION (ML) INTRAVENOUS
Status: DISCONTINUED | OUTPATIENT
Start: 2022-03-07 | End: 2022-03-09 | Stop reason: HOSPADM

## 2022-03-07 RX ADMIN — VANCOMYCIN HYDROCHLORIDE 1500 MG: 10 INJECTION, POWDER, LYOPHILIZED, FOR SOLUTION INTRAVENOUS at 23:43

## 2022-03-07 RX ADMIN — Medication 9 UNITS: at 17:51

## 2022-03-07 RX ADMIN — CLINDAMYCIN HYDROCHLORIDE 300 MG: 150 CAPSULE ORAL at 23:43

## 2022-03-07 RX ADMIN — VANCOMYCIN HYDROCHLORIDE 1000 MG: 1 INJECTION, POWDER, LYOPHILIZED, FOR SOLUTION INTRAVENOUS at 00:11

## 2022-03-07 RX ADMIN — Medication 9 UNITS: at 08:23

## 2022-03-07 RX ADMIN — MORPHINE SULFATE 2 MG: 2 INJECTION, SOLUTION INTRAMUSCULAR; INTRAVENOUS at 05:17

## 2022-03-07 RX ADMIN — LISINOPRIL 40 MG: 20 TABLET ORAL at 08:17

## 2022-03-07 RX ADMIN — GABAPENTIN 900 MG: 300 CAPSULE ORAL at 23:43

## 2022-03-07 RX ADMIN — DILTIAZEM HYDROCHLORIDE 240 MG: 240 CAPSULE, COATED, EXTENDED RELEASE ORAL at 08:17

## 2022-03-07 RX ADMIN — Medication 12 UNITS: at 22:57

## 2022-03-07 RX ADMIN — VANCOMYCIN HYDROCHLORIDE 1000 MG: 1 INJECTION, POWDER, LYOPHILIZED, FOR SOLUTION INTRAVENOUS at 08:19

## 2022-03-07 RX ADMIN — Medication 3 UNITS: at 11:41

## 2022-03-07 RX ADMIN — ASPIRIN 81 MG 81 MG: 81 TABLET ORAL at 08:17

## 2022-03-07 RX ADMIN — SODIUM CHLORIDE, PRESERVATIVE FREE 10 ML: 5 INJECTION INTRAVENOUS at 22:58

## 2022-03-07 RX ADMIN — HYDROCODONE BITARTRATE AND ACETAMINOPHEN 2 TABLET: 5; 325 TABLET ORAL at 17:44

## 2022-03-07 RX ADMIN — HYDROCODONE BITARTRATE AND ACETAMINOPHEN 2 TABLET: 5; 325 TABLET ORAL at 08:15

## 2022-03-07 RX ADMIN — SODIUM CHLORIDE, PRESERVATIVE FREE 10 ML: 5 INJECTION INTRAVENOUS at 05:17

## 2022-03-07 RX ADMIN — SODIUM CHLORIDE, PRESERVATIVE FREE 10 ML: 5 INJECTION INTRAVENOUS at 23:13

## 2022-03-07 RX ADMIN — INSULIN GLARGINE 20 UNITS: 100 INJECTION, SOLUTION SUBCUTANEOUS at 22:57

## 2022-03-07 RX ADMIN — ATORVASTATIN CALCIUM 20 MG: 20 TABLET, FILM COATED ORAL at 23:43

## 2022-03-07 RX ADMIN — GABAPENTIN 900 MG: 300 CAPSULE ORAL at 08:17

## 2022-03-07 RX ADMIN — HYDROCHLOROTHIAZIDE 25 MG: 25 TABLET ORAL at 08:17

## 2022-03-07 RX ADMIN — CLINDAMYCIN 300 MG: 150 INJECTION, SOLUTION INTRAMUSCULAR; INTRAVENOUS at 08:24

## 2022-03-07 RX ADMIN — GABAPENTIN 900 MG: 300 CAPSULE ORAL at 17:45

## 2022-03-07 RX ADMIN — MORPHINE SULFATE 2 MG: 2 INJECTION, SOLUTION INTRAMUSCULAR; INTRAVENOUS at 23:45

## 2022-03-07 NOTE — PROGRESS NOTES
[address] [facility] Pharmacokinetic Monitoring Service - Vancomycin    Indication: SSTI  Target Concentration: Goal AUC/LORENZO 400-600 mg*hr/L  Day of Therapy: 3  Additional Antimicrobials: ceftriaxone    Pertinent Laboratory Values: Wt Readings from Last 1 Encounters:   03/06/22 103 kg (227 lb)     Temp Readings from Last 1 Encounters:   03/07/22 98.3 °F (36.8 °C)     No components found for: PROCAL  Estimated Creatinine Clearance: 95.4 mL/min (based on SCr of 0.75 mg/dL). Recent Labs     03/07/22  0101 03/06/22  0109   WBC 11.9 11.0       Pertinent Cultures:  Culture Date Source Results   03/05 blood NGTD   03/05 blood NGTD   MRSA Nasal Swab: N/A. Non-respiratory infection.     Assessment:  Date/Time Current Dose Concentration (mg/L) Timing of Concentration (h) AUC   03/05 2,000 mg x1 - NA    03/06 1,000 mg q12h - NA    03/07 1,500 mg q18h 20.9 unclear    Note: Serum concentrations collected for AUC dosing may appear elevated if collected in close proximity to the dose administered, this is not necessarily an indication of toxicity    Plan:  Change dose from 1,000 mg q12h to 1,500 mg q18h  No level ordered at this time  Pharmacy will continue to monitor patient and adjust therapy as indicated    Thank you for the consult,  LYLA Sandhu  3/7/2022

## 2022-03-07 NOTE — PROGRESS NOTES
Reason for Admission:  Cellulitis of finger of right hand [L03.011]                 RUR Score:    7            Plan for utilizing home health:    n/a                      Likelihood of Readmission:   LOW                         Transition of Care Plan:              Initial assessment completed with patient. Cognitive status of patient: oriented to time, place, person and situation. Face sheet information confirmed:  yes. The patient designates spouse to participate in her discharge plan and to receive any needed information. This patient lives in a single family home with spouse. Patient is able to navigate steps as needed. Prior to hospitalization, patient was considered to be independent with ADLs/IADLS : yes . Patient has a current ACP document on file: no      Healthcare Decision Maker:     Click here to complete [address] including selection of the Healthcare Decision Maker Relationship (ie \"Primary\")    The spouse will be available to transport patient home upon discharge. The patient already has none reported, and  medical equipment available in the home. Patient is not currently active with home health. Patient has not stayed in a skilled nursing facility or rehab. Was  stay within last 60 days : no. This patient is on dialysis :no       Currently, the discharge plan is Home. The patient states that she can obtain her medications from the pharmacy, and take her medications as directed. Patient's current insurance is [employer] [facility] [employer]  PCP Verified by CM:  Yes  Mode of Transport at Discharge: Self  Transition of Care Consult (CM Consult): Discharge Planning  Support Systems: Spouse/Significant Other  Confirm Follow Up Transport: Self  The Plan for Transition of Care is Related to the Following Treatment Goals : home  Discharge Location  Patient Expects to be Discharged to[de-identified] Home

## 2022-03-07 NOTE — ROUTINE PROCESS
Report received from [facility]. Patient is alert, in bed. Right hand dressing with ace wrap intact. No distress noted. Call bell in reach.

## 2022-03-07 NOTE — PROGRESS NOTES
conducted an initial consultation and Spiritual Assessment for Елена Linn, who is a 55 y.o.,female. Patients Primary Language is: [place]. According to the patients EMR [employer] Affiliation is: [place]. The reason the Patient came to the hospital is:   Patient Active Problem List    Diagnosis Date Noted    Cellulitis of finger of right hand 03/05/2022    HTN (hypertension) 03/05/2022    DM (diabetes mellitus) ([facility] [address] 03/05/2022        The  provided the following Interventions:  Initiated a relationship of care and support. Listened empathically. Provided information about Spiritual Care Services. Chart reviewed. The following outcomes where achieved:  Patient expressed gratitude for 's visit. Assessment:  Patient does not have any Scientology/cultural needs that will affect patients preferences in health care. There are no spiritual or Scientology issues which require intervention at this time. Plan:  Chaplains will continue to follow and will provide pastoral care on an as needed/requested basis.  recommends bedside caregivers page  on duty if patient shows signs of acute spiritual or emotional distress.     [address]  [phone])

## 2022-03-07 NOTE — PROGRESS NOTES
Hospitalist Progress Note    Patient: Cuca Silva Age: 55 y.o. : 1965 MR#: [id number] SSN: [id number]  Date/Time: 3/7/2022 9:23 AM    DOA: 3/5/2022  PCP: John Das MD    Subjective:     She felt pain but still with pain in her right hand, she cannot move her finger much. No fever. No leukocytosis  Intraoperative cx with staph. ID consulted evaluated her today         Interval Hospital Course:  55 y.o female with HTN, h/o CVA, obesity,  Type 2 DM, presented to [facility] with right ring finger pain and swelling that has been worsened over the last day. 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 In the ER, xray of hand was without gas. She received IV zosyn while in [facility].         ROS:  No current fever/chills, no headache, no dizziness, no facial pain, no sinus congestion,   No swallowing pain, No chest pain, no palpitation, no shortness of breath, no abd pain,  No diarrhea, no urinary complaint, no leg pain or swelling, +right hand pain      Assessment/Plan:     1. Cellulitis of finger of right hand with underlying abscess   2. HTN   3. Hyperglycemia with Type 2 DM, HgbA1c 10.9%  4. Morbid obesity BMI 41   5. Hyperlipidemia   6. Diabetic neuropathy   7. Anemia of blood loss     Spoke with ID for antibiotic selection. Will continue for Vancomycin, will d/c ceftriaxone, likely d/c clinda as well. Ortho follows for wound care.  Elevate extremity   Cont her lantus and ISS   Cont home medications as tolerated    ICS  Hold of heparin sq    Full code   Disposition planning: home in 1-2 days   Family updated (.NONE.)  Additional Notes:    Time spent > 35 minutes    Case discussed with:  [x]Patient  []Family  [x]Nursing  []Case Management  DVT Prophylaxis:  []Lovenox  []Hep SQ  [x]SCDs  []Coumadin   []On Heparin gtt    Signed By: Ambreen Tabor MD     2022 9:23 AM              Objective:   VS:   Visit Vitals  /85 (BP 1 Location: Left upper arm, BP Patient Position: At rest) Pulse 80   Temp 98.3 °F (36.8 °C)   Resp 18   Ht 5' 2\" (1.575 m)   Wt 103 kg (227 lb)   SpO2 98%   Breastfeeding No   BMI 41.52 kg/m²      Tmax/24hrs: Temp (24hrs), Av.3 °F (36.8 °C), Min:97.9 °F (36.6 °C), Max:98.8 °F (37.1 °C)      Intake/Output Summary (Last 24 hours) at 3/7/2022 2385  Last data filed at 3/6/2022 1310  Gross per 24 hour   Intake 750 ml   Output --   Net 750 ml       Tele:   General:  Cooperative, Not in acute distress, speaks in full sentence while in bed  HEENT: PERRL, EOMI, supple neck, no JVD, dry oral mucosa  Cardiovascular: S1S2 regular, no rub/gallop   Pulmonary: air entry bilaterally, no wheezing, no crackle  GI:  Soft, non tender, non distended, +bs, no guarding   Extremities:  No pedal edema, +distal pulses appreciated   Right ring finger/hand infection, indolent, ?abscess    Neuro: AOx3, moving all extremities, no gross deficit.      Additional:       Current Facility-Administered Medications   Medication Dose Route Frequency    vancomycin (VANCOCIN) 1500 mg in  ml infusion  1,500 mg IntraVENous Q18H    HYDROcodone-acetaminophen (NORCO) 5-325 mg per tablet 1-2 Tablet  1-2 Tablet Oral Q4H PRN    clindamycin phosphate (CLEOCIN) 300 mg in 0.9% sodium chloride 50 mL IVPB  300 mg IntraVENous Q8H    insulin glargine (LANTUS) injection 20 Units  20 Units SubCUTAneous QHS    sodium chloride (NS) flush 5-10 mL  5-10 mL IntraVENous PRN    sodium chloride (NS) flush 5-40 mL  5-40 mL IntraVENous Q8H    sodium chloride (NS) flush 5-40 mL  5-40 mL IntraVENous PRN    cefTRIAXone (ROCEPHIN) 1 g in sterile water (preservative free) 10 mL IV syringe  1 g IntraVENous Q24H    acetaminophen (TYLENOL) tablet 650 mg  650 mg Oral Q4H PRN    [Held by provider] heparin (porcine) injection 5,000 Units  5,000 Units SubCUTAneous Q8H    aspirin chewable tablet 81 mg  81 mg Oral DAILY    gabapentin (NEURONTIN) capsule 900 mg  900 mg Oral TID    hydroCHLOROthiazide (HYDRODIURIL) tablet 25 mg 25 mg Oral DAILY    atorvastatin (LIPITOR) tablet 20 mg  20 mg Oral QHS    insulin lispro (HUMALOG) injection   SubCUTAneous AC&HS    glucose chewable tablet 16 g  4 Tablet Oral PRN    glucagon (GLUCAGEN) injection 1 mg  1 mg IntraMUSCular PRN    dextrose 10% infusion 0-250 mL  0-250 mL IntraVENous PRN    morphine injection 2 mg  2 mg IntraVENous Q3H PRN    dilTIAZem ER (CARDIZEM CD) capsule 240 mg  240 mg Oral DAILY    lisinopriL (PRINIVIL, ZESTRIL) tablet 40 mg  40 mg Oral DAILY            Lab/Data Review:  Labs: Results:       Chemistry Recent Labs     03/07/22  0101 03/06/22  0109 03/05/22  1120   * 216* 302*   * 137 135*   K 3.5 3.5 3.7    104 100   CO2 28 28 30   BUN 8 9 11   CREA 0.75 0.59* 0.65   BUCR 11* 15 17   AGAP 5 5 5   CA 9.2 9.1 9.1     Recent Labs     03/05/22  1120   ALT 18   TP 7.8   ALB 3.4   GLOB 4.4*   AGRAT 0.8      CBC w/Diff Recent Labs     03/07/22  0101 03/06/22  0109 03/06/22  0109 03/05/22  1120 03/05/22  1120   WBC 11.9  --  11.0  --  10.4   RBC 3.99*  --  4.39  --  4.42   HGB 10.7*  --  11.3*  --  12.0   HCT 32.9*  --  36.0  --  35.8   MCV 82.5   < > 82.0   < > 81.0   MCH 26.8   < > 25.7   < > 27.1   MCHC 32.5   < > 31.4   < > 33.5   RDW 13.3   < > 13.3   < > 13.2     --  379  --  340   GRANS  --   --   --   --  69   LYMPH  --   --   --   --  24   EOS  --   --   --   --  1    < > = values in this interval not displayed. Coagulation No results for input(s): PTP, INR, APTT, INREXT, INREXT in the last 72 hours.     Iron/Ferritin Lab Results   Component Value Date/Time    Iron 43 06/07/2010 07:55 AM    TIBC 294 06/07/2010 07:55 AM    Iron % saturation 15 (L) 06/07/2010 07:55 AM    Ferritin 73 06/07/2010 07:55 AM       BNP    Cardiac Enzymes Lab Results   Component Value Date/Time     10/30/2017 08:23 PM    CK - MB <1.0 10/30/2017 08:23 PM    CK-MB Index  10/30/2017 08:23 PM     CALCULATION NOT PERFORMED WHEN RESULT IS BELOW LINEAR LIMIT Troponin-I, QT <0.02 10/30/2017 08:23 PM        Lactic Acid    Thyroid Studies          All Micro Results     Procedure Component Value Units Date/Time    CULTURE, BLOOD [[id number]] Collected: 03/05/22 1120    Order Status: Completed Specimen: Blood Updated: 03/07/22 0701     Special Requests: NO SPECIAL REQUESTS        Culture result: NO GROWTH 2 DAYS       CULTURE, BLOOD [[id number]] Collected: 03/05/22 1135    Order Status: Completed Specimen: Blood Updated: 03/07/22 0701     Special Requests: NO SPECIAL REQUESTS        Culture result: NO GROWTH 2 DAYS       CULTURE, ANAEROBIC [[id number]] Collected: 03/06/22 1246    Order Status: Completed Specimen: Surgical Specimen Updated: 03/06/22 1935    CULTURE, BODY FLUID Carlos Abreu [[id number]] Collected: 03/06/22 1246    Order Status: Completed Specimen: Body Fluid from Hand Updated: 03/06/22 1345     Special Requests: SURG RT HAND     GRAM STAIN FEW WBCS SEEN               RARE GRAM POSITIVE COCCI IN PAIRS           Culture result: PENDING    CULTURE, MRSA [[id number]]     Order Status: Sent Specimen: Nasal     COVID-19 RAPID TEST [[id number]] Collected: 03/05/22 1415    Order Status: Completed Specimen: Nasopharyngeal Updated: 03/05/22 1541     Specimen source Nasopharyngeal        COVID-19 rapid test Not detected        Comment: Rapid Abbott ID Now       Rapid NAAT:  The specimen is NEGATIVE for SARS-CoV-2, the novel coronavirus associated with COVID-19. Negative results should be treated as presumptive and, if inconsistent with clinical signs and symptoms or necessary for patient management, should be tested with an alternative molecular assay. Negative results do not preclude SARS-CoV-2 infection and should not be used as the sole basis for patient management decisions. This test has been authorized by the FDA under an Emergency Use Authorization (EUA) for use by authorized laboratories.    Fact sheet for Healthcare Providers: ConventionUpdate.co.nz  Fact sheet for Patients: ConventionUpdate.co.nz       Methodology: Isothermal Nucleic Acid Amplification         COVID-19 RAPID TEST [[id number]]     Order Status: Canceled             Images:    CT (Most Recent). XRAY (Most Recent) XR Results (most recent):  Results from Hospital Encounter encounter on 03/05/22    XR 4TH FINGER RT MIN 2 V    Narrative  Right fourth digit radiographs    HISTORY: Redness and swelling right hand upon waking, pain and difficulty  bending fingers, extensive redness and swelling fourth digit. COMPARISON: None. FINDINGS:    Three views obtained. There is no fracture or dislocation. The joint spaces  are maintained. Mineralization is normal.   There is edema or inflammation at  the base of the fourth digit. No soft tissue gas. There is no radiopaque  foreign body. Impression  Edema or inflammation at the base of the fourth digit. No soft tissue gas or  bony abnormality. EKG No results found for this or any previous visit.      2D ECHO

## 2022-03-07 NOTE — DIABETES MGMT
Diabetes/ Glycemic Control Plan of Care    Recommend additional dose of lantus 20 units AM    Assessment:   She confirms her home DM medications as below and states compliance. She monitors BG twice daily -   We reviewed her A1c and target of 7% or less. She states she has lost 50 lbs in the past year under direction of her PCP. We reviewed DM meal planning, portions, healthy snacks and  BG monitoring. Printed materials provided. She is receiving basal and corrective insulin - recommend additional dose of lantus. Will continue to monitor    DX:   1. Cellulitis of finger of right hand        Fasting/ Morning blood glucose:   Lab Results   Component Value Date/Time    Glucose 315 (H) 03/07/2022 01:01 AM    Glucose (POC) 197 (H) 03/07/2022 11:16 AM    Glucose,  (H) 10/21/2016 07:12 AM     IV Fluids containing dextrose: none  Steroids:  none    Blood glucose values: Within target range (70-180mg/dL):  no    Current insulin orders:   lantus 20 units PM  Corrective humalog, very insulin resistant, 4 times daily    Total Daily Dose previous 24 hours = 43 units   20 units lantus  23 units humalog     Current A1c:   Lab Results   Component Value Date/Time    Hemoglobin A1c 10.9 (H) 02/04/2022 10:15 AM      equivalent  to ave Blood Glucose of 266 mg/dl for 2-3 months prior to admission  Adequate glycemic control PTA:no    Nutrition/Diet:   Active Orders   Diet    ADULT DIET Regular; 3 carb choices (45 gm/meal); No Concentrated Sweets      Meal Intake:  No data found. Supplement Intake:  No data found. Home diabetes medications:   Confirmed with patient 3/7/22   Key Antihyperglycemic Medications             insulin aspart U-100 (NovoLOG Flexpen U-100 Insulin) 100 unit/mL (3 mL) inpn (Taking) 20 Units by SubCUTAneous route two (2) times a day. metFORMIN (GLUCOPHAGE) 500 mg tablet (Taking) Take 1,000 mg by mouth two (2) times daily (with meals).         Plan/Goals:   Blood glucose will be within target of 70 - 180 mg/dl within 72 hours  Reinforce dietary and medication compliance at home.          Education:  [x] Refer to Diabetes Education Record                       [] Education not indicated at this time     Aniceto Marcano MPH RN [address]  Pager [phone]  Office [phone]

## 2022-03-07 NOTE — CONSULTS
Infectious Disease Consultation Note        Reason: Abscess right ring finger    Current abx Prior abx   Ceftriaxone, vancomycin since 3/5  Clindamycin since 3/6      Lines:       Assessment :    55 y.o. female with a PMHx of HTN, uncontrolled type 2 diabetes-last hemoglobin A1c 10.9 on 2/4/2022, CVA, AKI who presented to the ED on 3/5/22 with c/o redness, severe pain, swelling to right ring finger associated with decreased ROM. Clinical presentation consistent with deep abscess dorsal aspect right ring finger  Patient has been appropriately managed with I&D right ring finger on 3/6/2022  Intra-Op cultures 3/6-staph species    Intra-Op findings noted. No evidence of bone involvement. No evidence of PIP joint involvement. Recommendations:    1. Discontinue ceftriaxone, clindamycin. Continue vancomycin  2. Follow-up susceptibilities of Staphylococcus in wound culture and modify antibiotics accordingly  3. Follow-up Ortho recommendations regarding wound care    Thank you for consultation request. Above plan was discussed in details with patient,  and dr Nguyễn Newby. Please call me if any further questions or concerns. Will continue to participate in the care of this patient. HPI:    55 y.o. female with a PMHx of HTN, DM, CVA, AKI who presented to the ED on 3/5/22 with c/o redness, severe pain, swelling to right ring finger associated with decreased ROM. She noticed a small \"red spot\" on 3/4. She subsequently noted significant pain/redness/swelling right ring finger when she woke up on 3/5. Hence she came to emergency room. In the ED, vitals are stable, lactic 0.79, WBC 10.4. XR with edema/inflammation, no soft tissue gas or OM. Ortho was consulted. Status post I&D right ring finger. Patient was started on Bactrim antibiotics. I have been consulted for further recommendations. 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  She does not recall injuring herself or poking herself with anything at work or at home. She denies fever, cp, sob, cough, abd pain, nvd. No history of MRSA colonization and infection in the past         Past Medical History:   Diagnosis Date    Arthritis     Diabetes ([facility]     Hypertension     Morbid obesity ([facility] [facility]     BMI-50    Psychiatric disorder     CLAUSTROPHOBIA    Stroke ([facility] [address] [facility] 2006    no residuals    Unspecified sleep apnea     uses cpap    Vitamin D deficiency        Past Surgical History:   Procedure Laterality Date    COLONOSCOPY N/A 10/7/2016    COLONOSCOPY aborted poor prep performed by Marciano Peng MD at [address] COLONOSCOPY N/A 10/21/2016    COLONOSCOPY performed by Branden Aquino MD at [address] HX HYSTERECTOMY  2006    HX KNEE ARTHROSCOPY  2005 AND 2012    RIGHT KNEE IN 2005, LEFT KNEE IN 2012    AL BREAST SURGERY PROCEDURE UNLISTED  1970'S    LEFT BREAST BX X2     US GUIDED CORE BREAST BIOPSY      LT. br. 2016 benign fat necrosis       Current Discharge Medication List      CONTINUE these medications which have NOT CHANGED    Details   insulin aspart U-100 (NovoLOG Flexpen U-100 Insulin) 100 unit/mL (3 mL) inpn 20 Units by SubCUTAneous route two (2) times a day. simvastatin (ZOCOR) 40 mg tablet Take 40 mg by mouth nightly. diclofenac EC (VOLTAREN) 75 mg EC tablet Take 75 mg by mouth two (2) times a day. metFORMIN (GLUCOPHAGE) 500 mg tablet Take 1,000 mg by mouth two (2) times daily (with meals). gabapentin (NEURONTIN) 300 mg capsule Take 900 mg by mouth three (3) times daily. Indications: neuropathic pain      aspirin 81 mg chewable tablet Take 81 mg by mouth daily. lisinopril (PRINIVIL, ZESTRIL) 30 mg tablet Take 30 mg by mouth two (2) times a day. diltiazem hcl 240 mg Tb24 Take 1 Tab by mouth two (2) times a day. Indications: HYPERTENSION      hydrochlorothiazide (HYDRODIURIL) 25 mg tablet Take 25 mg by mouth daily.     Indications: HYPERTENSION             Current Facility-Administered Medications   Medication Dose Route Frequency    HYDROcodone-acetaminophen (NORCO) 5-325 mg per tablet 1-2 Tablet  1-2 Tablet Oral Q4H PRN    clindamycin phosphate (CLEOCIN) 300 mg in 0.9% sodium chloride 50 mL IVPB  300 mg IntraVENous Q8H    insulin glargine (LANTUS) injection 20 Units  20 Units SubCUTAneous QHS    sodium chloride (NS) flush 5-10 mL  5-10 mL IntraVENous PRN    sodium chloride (NS) flush 5-40 mL  5-40 mL IntraVENous Q8H    sodium chloride (NS) flush 5-40 mL  5-40 mL IntraVENous PRN    cefTRIAXone (ROCEPHIN) 1 g in sterile water (preservative free) 10 mL IV syringe  1 g IntraVENous Q24H    acetaminophen (TYLENOL) tablet 650 mg  650 mg Oral Q4H PRN    [Held by provider] heparin (porcine) injection 5,000 Units  5,000 Units SubCUTAneous Q8H    aspirin chewable tablet 81 mg  81 mg Oral DAILY    gabapentin (NEURONTIN) capsule 900 mg  900 mg Oral TID    hydroCHLOROthiazide (HYDRODIURIL) tablet 25 mg  25 mg Oral DAILY    atorvastatin (LIPITOR) tablet 20 mg  20 mg Oral QHS    insulin lispro (HUMALOG) injection   SubCUTAneous AC&HS    glucose chewable tablet 16 g  4 Tablet Oral PRN    glucagon (GLUCAGEN) injection 1 mg  1 mg IntraMUSCular PRN    dextrose 10% infusion 0-250 mL  0-250 mL IntraVENous PRN    morphine injection 2 mg  2 mg IntraVENous Q3H PRN    dilTIAZem ER (CARDIZEM CD) capsule 240 mg  240 mg Oral DAILY    lisinopriL (PRINIVIL, ZESTRIL) tablet 40 mg  40 mg Oral DAILY    vancomycin (VANCOCIN) 1,000 mg in 0.9% sodium chloride 250 mL (VIAL-MATE)  1,000 mg IntraVENous Q12H       Allergies: Patient has no known allergies.     Family History   Problem Relation Age of Onset    Cancer Maternal Grandmother      Social History     Socioeconomic History    Marital status:      Spouse name: Not on file    Number of children: Not on file    Years of education: Not on file    Highest education level: Not on file   Occupational History    Not on file Tobacco Use    Smoking status: Never Smoker    Smokeless tobacco: Never Used   Substance and Sexual Activity    Alcohol use: No    Drug use: No    Sexual activity: Not on file   Other Topics Concern    Not on file   Social History Narrative    Not on file     Social Determinants of Health     Financial Resource Strain:     Difficulty of Paying Living Expenses: Not on file   Food Insecurity:     Worried About Running Out of Food in the Last Year: Not on file    Narcisa of Food in the Last Year: Not on file   Transportation Needs:     Lack of Transportation (Medical): Not on file    Lack of Transportation (Non-Medical): Not on file   Physical Activity:     Days of Exercise per Week: Not on file    Minutes of Exercise per Session: Not on file   Stress:     Feeling of Stress : Not on file   Social Connections:     Frequency of Communication with Friends and Family: Not on file    Frequency of Social Gatherings with Friends and Family: Not on file    Attends [employer] Services: Not on file    Active Member of [address] [place] [address] or Organizations: Not on file    Attends Club or Organization Meetings: Not on file    Marital Status: Not on file   Intimate Partner Violence:     Fear of Current or Ex-Partner: Not on file    Emotionally Abused: Not on file    Physically Abused: Not on file    Sexually Abused: Not on file   Housing Stability:     Unable to Pay for Housing in the Last Year: Not on file    Number of [place] in the Last Year: Not on file    Unstable Housing in the Last Year: Not on file     Social History     Tobacco Use   Smoking Status Never Smoker   Smokeless Tobacco Never Used        Temp (24hrs), Av.3 °F (36.8 °C), Min:97.9 °F (36.6 °C), Max:98.8 °F (37.1 °C)    Visit Vitals  /78   Pulse 81   Temp 98.4 °F (36.9 °C)   Resp 18   Ht 5' 2\" (1.575 m)   Wt 103 kg (227 lb)   SpO2 100%   Breastfeeding No   BMI 41.52 kg/m²       ROS: 12 point ROS obtained in details.  Pertinent positives as mentioned in HPI,   otherwise negative    Physical Exam:    General: Well developed, well nourished female sitting on the  bed AAOx3 in no acute distress. General:   awake alert and oriented   HEENT:  Normocephalic, atraumatic, EOMI, no scleral icterus or pallor; no conjunctival hemmohage;  nasal and oral mucous are moist and without evidence of lesions. No thrush. Dentition good. Neck supple, no bruits. Lymph Nodes:   no cervical, axillary or inguinal adenopathy   Lungs:   non-labored, bilaterally clear to auscultation- no crackles wheezes rales or rhonchi   Heart:  RRR, s1 and s2; no rubs or gallops, no edema, + pedal pulses   Abdomen:  soft, non-distended, active bowel sounds, no hepatomegaly, no splenomegaly. Non-tender   Genitourinary:  deferred   Extremities:   no clubbing, cyanosis; right ring finger surgical dressing not opened,  muscle mass appropriate for age   Neurologic:  No gross focal sensory abnormalities; 5/5 muscle strength to upper and lower extremities. Speech appropriate.  Cranial nerves intact                        Skin:  No rash or ulcers noted   Back:  no spinal or paraspinal muscle tenderness or rigidity, no CVA tenderness     Psychiatric:  No suicidal or homicidal ideations, appropriate mood and affect         Labs: Results:   Chemistry Recent Labs     03/07/22  0101 03/06/22  0109 03/05/22  1120   * 216* 302*   * 137 135*   K 3.5 3.5 3.7    104 100   CO2 28 28 30   BUN 8 9 11   CREA 0.75 0.59* 0.65   CA 9.2 9.1 9.1   AGAP 5 5 5   BUCR 11* 15 17   AP  --   --  78   TP  --   --  7.8   ALB  --   --  3.4   GLOB  --   --  4.4*   AGRAT  --   --  0.8      CBC w/Diff Recent Labs     03/07/22  0101 03/06/22  0109 03/05/22  1120   WBC 11.9 11.0 10.4   RBC 3.99* 4.39 4.42   HGB 10.7* 11.3* 12.0   HCT 32.9* 36.0 35.8    379 340   GRANS  --   --  69   LYMPH  --   --  24   EOS  --   --  1      Microbiology Recent Labs     03/06/22  1246 03/05/22  1135 03/05/22  1128 CULT PENDING NO GROWTH 2 DAYS NO GROWTH 2 DAYS          RADIOLOGY:    All available imaging studies/reports in [facility] for this admission were reviewed      Disclaimer: Sections of this note are dictated utilizing voice recognition software, which may have resulted in some phonetic based errors in grammar and contents. Even though attempts were made to correct all the mistakes, some may have been missed, and remained in the body of the document. If questions arise, please contact our department.     Dr. Lewis Johnson, Infectious Disease Specialist  [phone]  March 7, 2022  8:18 AM

## 2022-03-07 NOTE — DIABETES MGMT
Diabetes Patient/Family Education Record    Factors That May Influence Patients Ability to Learn or Comply with Recommendations   []   Language barrier    []   Cultural needs   []   Motivation    []   Cognitive limitation    []   Physical   [x]   Education    []   Physiological factors   []   Hearing/vision/speaking impairment   []   Faith beliefs    []   Financial factors   []  Other:   []  No factors identified at this time. Person Instructed:   [x]   Patient   []   Family   []  Other     Preference for Learning:   [x]   Verbal   [x]   Written   []  Demonstration     Level of Comprehension & Competence:    [x]  Good                                      [] Fair                                     []  Poor                             []  Needs Reinforcement   [x]  Teach back completed - utilizing post-prandial BG results to assist with meal planning     Education Component:  See DM note. Reviewed importance of DM management for wound healing   [x]  Medication management - compliant with novolog and metformin as prescribed    [x]  Nutritional management - [x] Obtained usual meal pattern   []   Basic carbohydrate counting  [x]  Plate method  [x]  Limit concentrated sweets and avoid sweetened beverages  []  Portion control  []    Avoid skipping meals   []  Exercise   [x]  Signs, symptoms, and treatment of hyperglycemia and hypoglycemia   [x] Prevention, recognition and treatment of hyperglycemia and hypoglycemia   [x]  Importance of blood glucose monitoring  [x] Blood Glucose targets   []   Provided patient with blood glucose meter  [x]  Has glucometer and supplies at home. Reviewed BG targets and to monitor post-prandial BG to aid in meal planning/portions. []  Instruction on use of the blood glucose meter and recommended monitoring schedule   [x]  Discuss the importance of HbA1C monitoring. Patients A1c is 10.9 %.  This is equivalent to average glucose of 266 mg/dl for the past 2-3 months.   []  Sick day guidelines   []  Proper use and disposal of lancets, needles, syringes or insulin pens (if appropriate)   [x]  Potential long-term complications (retinopathy, kidney disease, neuropathy, foot care)   [x] Information about whom to contact in case of emergency or for more information - follows with PCP    [x]  Goal:  Patient/family will demonstrate understanding of Diabetes Self- Management Skills by: (date) _______  Plan for post-discharge education or self-management support:    [] Outpatient class schedule provided            [] Patient Declined    [] Scheduled for outpatient classes (date) _______    [x] Written information provided  Verify: [x] Prior to admission Diabetes medications    Does patient understand how diabetes medications work? _____yes______________________  Does patient have difficulty obtaining diabetes medications or testing supplies? _____no____________    Chadwick Runner MPH RN [address]  Pager [phone]  Office [phone]

## 2022-03-07 NOTE — PROGRESS NOTES
Problem: Self Care Deficits Care Plan (Adult)  Goal: *Acute Goals and Plan of Care (Insert Text)  Description: Occupational Therapy Goals  Initiated 3/7/2022 within 7 day(s). 1.  Patient will participate in upper extremity therapeutic exercise/activities with modified independence for 8-10 minutes to prevent edema and maintain function for ADLs. 2.  Patient will utilize energy conservation techniques during functional activities with verbal cues. 3.  Patient will perform edema management techniques for 5- 8 minutes with minimal verbal cues to increase independence with ADLs  4. Patient will perform functional fine motor activity for 5-8 minutes with modified independence to increase independence with ADLs. Prior Level of Function: Patient was independent with self-care and functional mobility KIMBERLY Almanzar currently working at [address] [place] [address],[address], very active  Outcome: Progressing Towards Goal       OCCUPATIONAL THERAPY EVALUATION    Patient: Checo Zamarripa (55 y.o. female)  Date: 3/7/2022  Primary Diagnosis: Cellulitis of finger of right hand [L03.011]  Procedure(s) (LRB):  INCISION AND DRAINAGE RIGHT RING FINGER (Right) 1 Day Post-Op   Precautions:   (standard)      ASSESSMENT :  Patient cleared to participate in OT evaluation by RN. Upon entering the room, patient was supine in bed, alert, and agreeable to participate in OT evaluation. Patient is modified independent with basic self-care and independent with bed mobility/ functional transfers using no AD in preparation for ADLs. Patient with 8/10 pain in R hand s/p I& D of 4th digit. Swelling observed in digits, LT sensation intact. Patient educated on the importance of elbow flex/ext, wrist flex/ext and ulnar and radial deviation, as well as active finger and hand movement to prevent edema and maintain function.  Patient able to demonstrate AROM of elbow to perform exercises, wrist/hand limited to wrist pronation/supination and finger flex/ext d/t excessive dressing bandage. Patient educated on finger opposition pending ability and more exercises to follow once dressing removed. Based on the objective data described below, the patient presents with decreased strength, decreased AROM, decreased fine motor skills and increased swelling, which impedes pt performance in basic self-care/ADL tasks. Patient would benefit from skilled OT to restore PLOF and maximize function. Patient will benefit from skilled intervention to address the above impairments. Patient's rehabilitation potential is considered to be Good  Factors which may influence rehabilitation potential include:   []             None noted  [x]             Mental ability/status  []             Medical condition  []             Home/family situation and support systems  []             Safety awareness  [x]             Pain tolerance/management  []             Other:      PLAN :  Recommendations and Planned Interventions:   [x]               Self Care Training                  [x]      Therapeutic Activities  []               Functional Mobility Training   []      Cognitive Retraining  [x]               Therapeutic Exercises           []      Endurance Activities  []               Balance Training                    []      Neuromuscular Re-Education  []               Visual/Perceptual Training     []      Home Safety Training  [x]               Patient Education                   [x]      Family Training/Education  []               Other (comment):    Frequency/Duration: Patient will be followed by occupational therapy 3 - 5 times a week to address goals.   Discharge Recommendations: Outpatient when cleared by MD  Further Equipment Recommendations for Discharge: N/A     SUBJECTIVE:   Patient stated I washed up this morning and did fine but when my arm hangs I feel a lot of pain    OBJECTIVE DATA SUMMARY:     Past Medical History:   Diagnosis Date    Arthritis     [facility]     Hypertension     Morbid obesity ([facility]     BMI-50    Psychiatric disorder     CLAUSTROPHOBIA    Stroke ([facility] 2006    no residuals    Unspecified sleep apnea     uses cpap    Vitamin D deficiency      Past Surgical History:   Procedure Laterality Date    COLONOSCOPY N/A 10/7/2016    COLONOSCOPY aborted poor prep performed by Libia Clark MD at [address] [place] N/A 10/21/2016    COLONOSCOPY performed by Elo Sanford MD at [address] HX HYSTERECTOMY  2006    HX KNEE ARTHROSCOPY  2005 AND 2012    RIGHT KNEE IN 2005, LEFT KNEE IN 2012    [place] BREAST SURGERY PROCEDURE UNLISTED  1970'S    LEFT BREAST BX X2     US GUIDED CORE BREAST BIOPSY      LT. br. 2016 benign fat necrosis     Barriers to Learning/Limitations: None  Compensate with: visual, verbal, tactile, kinesthetic cues/model    Home Situation:   Home Situation  Home Environment: Private residence  # Steps to Enter: 3  Rails to Enter: Yes  Hand Rails : Bilateral  One/Two Story Residence: One story  Living Alone: No  Support Systems: Spouse/Significant Other  Patient Expects to be Discharged to[de-identified] Home  Current DME Used/Available at Home: None  Tub or Shower Type: Tub/Shower combination  [x]  Right hand dominant   []  Left hand dominant    Cognitive/Behavioral Status:  Neurologic State: Alert  Orientation Level: Oriented X4  Cognition: Follows commands  Safety/Judgement: Awareness of environment;Good awareness of safety precautions    Skin: Intact  Edema: R hand    Vision/Perceptual:    Acuity: Within Defined Limits    Corrective Lenses: Reading glasses    Coordination: BUE  Fine Motor Skills-Upper: Left Intact; Right Impaired    Gross Motor Skills-Upper: Left Intact; Right Intact    Balance:  Sitting: Intact  Standing: Intact    Strength: BUE  Strength: Grossly decreased, non-functional (R hand)       Tone & Sensation: BUE  Tone: Normal  Sensation: Intact    Range of Motion: BUE  AROM: Generally decreased, functional (R digits/wrist d/t dressing)  PROM:  (NT as pt with excessive dressing)     Functional Mobility and Transfers for ADLs:  Bed Mobility:  Supine to Sit: Independent  Sit to Supine: Independent    Transfers:  Sit to Stand: Independent  Stand to Sit: Independent    ADL Assessment:   Feeding: Modified independent    Oral Facial Hygiene/Grooming: Modified Independent    Bathing: Modified independent    Upper Body Dressing: Modified independent    Lower Body Dressing: Modified independent    Toileting: Modified independent       ADL Intervention:  Feeding  Feeding Assistance: Modified independent  Container Management: Modified independent    Lower Body Dressing Assistance  Dressing Assistance: Modified independent  Socks: Modified independent  Leg Crossed Method Used: No  Position Performed: Seated edge of bed;Bending forward method    Cognitive Retraining  Safety/Judgement: Awareness of environment;Good awareness of safety precautions      Pain:  Pain level pre-treatment: 8/10 , R hand  Pain level post-treatment: 9/10   Pain Intervention(s): Medication (see MAR); Rest, Ice, Repositioning   Response to intervention: Nurse notified, See doc flow    Activity Tolerance:   Good      Please refer to the flowsheet for vital signs taken during this treatment. After treatment:   [] Patient left in no apparent distress sitting up in chair  [x] Patient left in no apparent distress in bed  [x] Call bell left within reach  [] Nursing notified  [] Caregiver present  [] Bed alarm activated    COMMUNICATION/EDUCATION:   [x] Role of Occupational Therapy in the acute care setting  [x] Home safety education was provided and the patient/caregiver indicated understanding. [x] Patient/family have participated as able in goal setting and plan of care. [x] Patient/family agree to work toward stated goals and plan of care. [] Patient understands intent and goals of therapy, but is neutral about his/her participation.   [] Patient is unable to participate in goal setting and plan of care. Thank you for this referral.  Pa Nunez OTR/L  Time Calculation: 16 mins    Eval Complexity: History: MEDIUM Complexity : Expanded review of history including physical, cognitive and psychosocial  history ; Examination: MEDIUM Complexity : 3-5 performance deficits relating to physical, cognitive , or psychosocial skils that result in activity limitations and / or participation restrictions;    Decision Making:LOW Complexity : No comorbidities that affect functional and no verbal or physical assistance needed to complete eval tasks

## 2022-03-07 NOTE — PROGRESS NOTES
14409 - Patient returned from having Incision and drainage of 4th finger right hand. Dressing remains intact to R hand no complaints of pain or discomfort. Patient resting in bed in no distress.   at bedside    0663 patient c/o pain to Right hand Morphine 2mg IV adminstered

## 2022-03-07 NOTE — PROGRESS NOTES
[facility]    Progress Note      Patient: Brandon Sadler               Sex: female          DOA: 3/5/2022         YOB: 1965      Age:  55 y.o.        LOS:  LOS: 2 days         S/P  Procedure(s):  INCISION AND DRAINAGE RIGHT RING FINGER               Subjective: Moderate pain    Denies cp, sob, abd pain   Objective:      Blood pressure 112/78, pulse 81, temperature 98.4 °F (36.9 °C), resp. rate 18, height 5' 2\" (1.575 m), weight 227 lb (103 kg), SpO2 100 %, not currently breastfeeding. Well developed, Well Nourished alert, cooperative, no distress  dressing in place  swelling and tenderness noted in right hand  Sensory is intact   Motor is intact  nv intact  2+distal pulses  Neg calf tenderness    Labs:  CBC  @  CBC:   Lab Results   Component Value Date/Time    WBC 11.9 03/07/2022 01:01 AM    RBC 3.99 (L) 03/07/2022 01:01 AM    HGB 10.7 (L) 03/07/2022 01:01 AM    HCT 32.9 (L) 03/07/2022 01:01 AM    PLATELET 576 02/91/5350 01:01 AM     BMP:   Lab Results   Component Value Date/Time    Glucose 315 (H) 03/07/2022 01:01 AM    Sodium 135 (L) 03/07/2022 01:01 AM    Potassium 3.5 03/07/2022 01:01 AM    Chloride 102 03/07/2022 01:01 AM    CO2 28 03/07/2022 01:01 AM    BUN 8 03/07/2022 01:01 AM    Creatinine 0.75 03/07/2022 01:01 AM    Calcium 9.2 03/07/2022 01:01 AM   @  Coagulation  Lab Results   Component Value Date    INR 1.0 03/08/2013    APTT 36.8 03/08/2013      Basic Metabolic Profile  Lab Results   Component Value Date     (L) 03/07/2022    CO2 28 03/07/2022    BUN 8 03/07/2022     Results     Procedure Component Value Units Date/Time    CULTURE, ANAEROBIC [[id number]] Collected: 03/06/22 1246    Order Status: Completed Specimen: Surgical Specimen Updated: 03/06/22 1935    CULTURE, BODY FLUID Angel Alfaro [[id number]] Collected: 03/06/22 1246    Order Status: Completed Specimen:  Body Fluid from Hand Updated: 03/06/22 4348     Special Requests: SURG RT HAND GRAM STAIN FEW WBCS SEEN               RARE GRAM POSITIVE COCCI IN PAIRS           Culture result: PENDING    CULTURE, MRSA [[id number]]     Order Status: Sent Specimen: Nasal     COVID-19 RAPID TEST [[id number]] Collected: 03/05/22 1415    Order Status: Completed Specimen: Nasopharyngeal Updated: 03/05/22 1541     Specimen source Nasopharyngeal        COVID-19 rapid test Not detected        Comment: Rapid Abbott ID Now       Rapid NAAT:  The specimen is NEGATIVE for SARS-CoV-2, the novel coronavirus associated with COVID-19. Negative results should be treated as presumptive and, if inconsistent with clinical signs and symptoms or necessary for patient management, should be tested with an alternative molecular assay. Negative results do not preclude SARS-CoV-2 infection and should not be used as the sole basis for patient management decisions. This test has been authorized by the FDA under an Emergency Use Authorization (EUA) for use by authorized laboratories.    Fact sheet for Healthcare Providers: ConventionUpdate.co.nz  Fact sheet for Patients: ConventionUpdate.co.nz       Methodology: Isothermal Nucleic Acid Amplification         COVID-19 RAPID TEST [[id number]]     Order Status: Canceled     CULTURE, BLOOD [[id number]] Collected: 03/05/22 1135    Order Status: Completed Specimen: Blood Updated: 03/06/22 0604     Special Requests: NO SPECIAL REQUESTS        Culture result: NO GROWTH AFTER 15 HOURS       CULTURE, BLOOD [[id number]] Collected: 03/05/22 1120    Order Status: Completed Specimen: Blood Updated: 03/06/22 0604     Special Requests: NO SPECIAL REQUESTS        Culture result: NO GROWTH AFTER 15 HOURS           Medications Reviewed      Assessment/Plan     Principal Problem:    Cellulitis of finger of right hand (3/5/2022)    Active Problems:    HTN (hypertension) (3/5/2022)      DM (diabetes mellitus) ([facility] (3/5/2022)        Procedure(s):  INCISION AND DRAINAGE RIGHT RING FINGER :     1. PT and/or OT Consults: YES continue PT/OT: oob to chair, gentle rom   rom hand and fingers                                              2. Incision Care:  Routine Incision Care and Dressing Changes : daily repacking with Hallie Hensleyazlinette xeroform then kerlex  3. Pain control  4. heaprin for DVT prophylaxis  5. Follow cultures. Recommend consult to ID for abx assistance. Wound care consulted for help with dressing changes    4.  DISCHARGE PLANNING     Intervention : [address] [facility]  [phone]

## 2022-03-08 LAB
ANION GAP SERPL CALC-SCNC: 4 MMOL/L (ref 3–18)
BACTERIA SPEC CULT: ABNORMAL
BACTERIA SPEC CULT: ABNORMAL
BACTERIA SPEC CULT: NORMAL
BUN SERPL-MCNC: 12 MG/DL (ref 7–18)
BUN/CREAT SERPL: 19 (ref 12–20)
CALCIUM SERPL-MCNC: 9.6 MG/DL (ref 8.5–10.1)
CHLORIDE SERPL-SCNC: 102 MMOL/L (ref 100–111)
CO2 SERPL-SCNC: 29 MMOL/L (ref 21–32)
CREAT SERPL-MCNC: 0.64 MG/DL (ref 0.6–1.3)
ERYTHROCYTE [DISTWIDTH] IN BLOOD BY AUTOMATED COUNT: 13.3 % (ref 11.6–14.5)
GLUCOSE BLD STRIP.AUTO-MCNC: 178 MG/DL (ref 70–110)
GLUCOSE BLD STRIP.AUTO-MCNC: 216 MG/DL (ref 70–110)
GLUCOSE BLD STRIP.AUTO-MCNC: 256 MG/DL (ref 70–110)
GLUCOSE BLD STRIP.AUTO-MCNC: 293 MG/DL (ref 70–110)
GLUCOSE SERPL-MCNC: 177 MG/DL (ref 74–99)
GRAM STN SPEC: ABNORMAL
GRAM STN SPEC: ABNORMAL
HCT VFR BLD AUTO: 33.3 % (ref 35–45)
HGB BLD-MCNC: 10.7 G/DL (ref 12–16)
MCH RBC QN AUTO: 26.6 PG (ref 24–34)
MCHC RBC AUTO-ENTMCNC: 32.1 G/DL (ref 31–37)
MCV RBC AUTO: 82.6 FL (ref 78–100)
NRBC # BLD: 0 K/UL (ref 0–0.01)
NRBC BLD-RTO: 0 PER 100 WBC
PLATELET # BLD AUTO: 328 K/UL (ref 135–420)
PMV BLD AUTO: 10.2 FL (ref 9.2–11.8)
POTASSIUM SERPL-SCNC: 3.9 MMOL/L (ref 3.5–5.5)
RBC # BLD AUTO: 4.03 M/UL (ref 4.2–5.3)
SERVICE CMNT-IMP: ABNORMAL
SERVICE CMNT-IMP: NORMAL
SODIUM SERPL-SCNC: 135 MMOL/L (ref 136–145)
WBC # BLD AUTO: 9.7 K/UL (ref 4.6–13.2)

## 2022-03-08 PROCEDURE — 85027 COMPLETE CBC AUTOMATED: CPT

## 2022-03-08 PROCEDURE — 65660000000 HC RM CCU STEPDOWN

## 2022-03-08 PROCEDURE — 80048 BASIC METABOLIC PNL TOTAL CA: CPT

## 2022-03-08 PROCEDURE — 74011250636 HC RX REV CODE- 250/636: Performed by: INTERNAL MEDICINE

## 2022-03-08 PROCEDURE — 74011000250 HC RX REV CODE- 250: Performed by: PHYSICIAN ASSISTANT

## 2022-03-08 PROCEDURE — 74011636637 HC RX REV CODE- 636/637: Performed by: PHYSICIAN ASSISTANT

## 2022-03-08 PROCEDURE — 74011250637 HC RX REV CODE- 250/637: Performed by: PHYSICIAN ASSISTANT

## 2022-03-08 PROCEDURE — 36415 COLL VENOUS BLD VENIPUNCTURE: CPT

## 2022-03-08 PROCEDURE — 74011250637 HC RX REV CODE- 250/637: Performed by: INTERNAL MEDICINE

## 2022-03-08 PROCEDURE — 74011636637 HC RX REV CODE- 636/637: Performed by: INTERNAL MEDICINE

## 2022-03-08 PROCEDURE — 74011250636 HC RX REV CODE- 250/636: Performed by: PHYSICIAN ASSISTANT

## 2022-03-08 PROCEDURE — 99232 SBSQ HOSP IP/OBS MODERATE 35: CPT | Performed by: INTERNAL MEDICINE

## 2022-03-08 PROCEDURE — 82962 GLUCOSE BLOOD TEST: CPT

## 2022-03-08 PROCEDURE — 97110 THERAPEUTIC EXERCISES: CPT

## 2022-03-08 PROCEDURE — 2709999900 HC NON-CHARGEABLE SUPPLY

## 2022-03-08 RX ORDER — INSULIN GLARGINE 100 [IU]/ML
25 INJECTION, SOLUTION SUBCUTANEOUS
Status: DISCONTINUED | OUTPATIENT
Start: 2022-03-08 | End: 2022-03-09 | Stop reason: HOSPADM

## 2022-03-08 RX ORDER — OXYCODONE HYDROCHLORIDE 5 MG/1
10 TABLET ORAL
Status: DISCONTINUED | OUTPATIENT
Start: 2022-03-08 | End: 2022-03-09 | Stop reason: HOSPADM

## 2022-03-08 RX ORDER — LINEZOLID 600 MG/1
600 TABLET, FILM COATED ORAL 2 TIMES DAILY
Qty: 24 TABLET | Refills: 0 | Status: SHIPPED | OUTPATIENT
Start: 2022-03-08 | End: 2022-03-20

## 2022-03-08 RX ORDER — MORPHINE SULFATE 2 MG/ML
2 INJECTION, SOLUTION INTRAMUSCULAR; INTRAVENOUS
Status: DISCONTINUED | OUTPATIENT
Start: 2022-03-08 | End: 2022-03-09 | Stop reason: HOSPADM

## 2022-03-08 RX ORDER — ACETAMINOPHEN 500 MG
1000 TABLET ORAL EVERY 8 HOURS
Status: DISCONTINUED | OUTPATIENT
Start: 2022-03-08 | End: 2022-03-09 | Stop reason: HOSPADM

## 2022-03-08 RX ADMIN — MORPHINE SULFATE 2 MG: 2 INJECTION, SOLUTION INTRAMUSCULAR; INTRAVENOUS at 08:28

## 2022-03-08 RX ADMIN — LISINOPRIL 40 MG: 20 TABLET ORAL at 08:28

## 2022-03-08 RX ADMIN — ACETAMINOPHEN 1000 MG: 500 TABLET ORAL at 23:14

## 2022-03-08 RX ADMIN — DILTIAZEM HYDROCHLORIDE 240 MG: 240 CAPSULE, COATED, EXTENDED RELEASE ORAL at 08:28

## 2022-03-08 RX ADMIN — Medication 9 UNITS: at 17:00

## 2022-03-08 RX ADMIN — INSULIN GLARGINE 25 UNITS: 100 INJECTION, SOLUTION SUBCUTANEOUS at 23:14

## 2022-03-08 RX ADMIN — HYDROCHLOROTHIAZIDE 25 MG: 25 TABLET ORAL at 08:28

## 2022-03-08 RX ADMIN — SODIUM CHLORIDE, PRESERVATIVE FREE 10 ML: 5 INJECTION INTRAVENOUS at 15:36

## 2022-03-08 RX ADMIN — ASPIRIN 81 MG 81 MG: 81 TABLET ORAL at 08:28

## 2022-03-08 RX ADMIN — ACETAMINOPHEN 1000 MG: 500 TABLET ORAL at 15:35

## 2022-03-08 RX ADMIN — SODIUM CHLORIDE, PRESERVATIVE FREE 10 ML: 5 INJECTION INTRAVENOUS at 23:14

## 2022-03-08 RX ADMIN — Medication 9 UNITS: at 12:30

## 2022-03-08 RX ADMIN — GABAPENTIN 900 MG: 300 CAPSULE ORAL at 08:28

## 2022-03-08 RX ADMIN — Medication 3 UNITS: at 23:14

## 2022-03-08 RX ADMIN — OXYCODONE 10 MG: 5 TABLET ORAL at 23:14

## 2022-03-08 RX ADMIN — GABAPENTIN 900 MG: 300 CAPSULE ORAL at 23:14

## 2022-03-08 RX ADMIN — ATORVASTATIN CALCIUM 20 MG: 20 TABLET, FILM COATED ORAL at 23:14

## 2022-03-08 RX ADMIN — GABAPENTIN 900 MG: 300 CAPSULE ORAL at 15:35

## 2022-03-08 RX ADMIN — MORPHINE SULFATE 2 MG: 2 INJECTION, SOLUTION INTRAMUSCULAR; INTRAVENOUS at 05:14

## 2022-03-08 RX ADMIN — VANCOMYCIN HYDROCHLORIDE 1500 MG: 10 INJECTION, POWDER, LYOPHILIZED, FOR SOLUTION INTRAVENOUS at 17:25

## 2022-03-08 RX ADMIN — SODIUM CHLORIDE, PRESERVATIVE FREE 10 ML: 5 INJECTION INTRAVENOUS at 05:14

## 2022-03-08 RX ADMIN — Medication 6 UNITS: at 08:20

## 2022-03-08 NOTE — PROGRESS NOTES
Discharge/Transition Planning    Uri EDGE in Case Management office assisted CM with prior auth and patient approved.  Has $30 copay

## 2022-03-08 NOTE — PROGRESS NOTES
Infectious Disease progress Note        Reason: Abscess right ring finger    Current abx Prior abx    vancomycin since 3/5   Ceftriaxone 3/5-3/7  Clindamycin  3/6-3/7     Lines:       Assessment :    55 y.o. female with a PMHx of HTN, uncontrolled type 2 diabetes-last hemoglobin A1c 10.9 on 2/4/2022, CVA, AKI who presented to the ED on 3/5/22 with c/o redness, severe pain, swelling to right ring finger associated with decreased ROM. Clinical presentation consistent with deep abscess dorsal aspect right ring finger  Patient has been appropriately managed with I&D right ring finger on 3/6/2022  Intra-Op cultures 3/6-MRSA. Susceptibilities reviewed    Intra-Op findings noted. No evidence of bone involvement. No evidence of PIP joint involvement. Recommendations:    1. Continue vancomycin for now  2. Follow-up Ortho recommendations regarding wound care  3. We will plan to switch to to p.o. linezolid till 3/19/2022 when patient ready for discharge    Linezolid prescription in chart. Discussed with      Above plan was discussed in details with patient,  and dr Flor Yuen. Please call me if any further questions or concerns. Will continue to participate in the care of this patient. HPI:    Feels better. No new complaints she denies fever, cp, sob, cough, abd pain, nvd.        Past Medical History:   Diagnosis Date    Arthritis     Diabetes ([facility]     Hypertension     Morbid obesity ([facility]     BMI-50    Psychiatric disorder     CLAUSTROPHOBIA    Stroke ([facility] 2006    no residuals    Unspecified sleep apnea     uses cpap    Vitamin D deficiency        Past Surgical History:   Procedure Laterality Date    COLONOSCOPY N/A 10/7/2016    COLONOSCOPY aborted poor prep performed by Jerrica Mcconnell MD at [address] COLONOSCOPY N/A 10/21/2016    COLONOSCOPY performed by Shravan Coulter MD at [address] HX HYSTERECTOMY  2006    HX KNEE ARTHROSCOPY  2005 AND 2012    RIGHT KNEE IN 2005, LEFT KNEE IN 2012    MT BREAST SURGERY PROCEDURE UNLISTED  1970'S    LEFT BREAST BX X2     US GUIDED CORE BREAST BIOPSY      LT. br. 2016 benign fat necrosis       Current Discharge Medication List      CONTINUE these medications which have NOT CHANGED    Details   insulin aspart U-100 (NovoLOG Flexpen U-100 Insulin) 100 unit/mL (3 mL) inpn 20 Units by SubCUTAneous route two (2) times a day. simvastatin (ZOCOR) 40 mg tablet Take 40 mg by mouth nightly. diclofenac EC (VOLTAREN) 75 mg EC tablet Take 75 mg by mouth two (2) times a day. metFORMIN (GLUCOPHAGE) 500 mg tablet Take 1,000 mg by mouth two (2) times daily (with meals). gabapentin (NEURONTIN) 300 mg capsule Take 900 mg by mouth three (3) times daily. Indications: neuropathic pain      aspirin 81 mg chewable tablet Take 81 mg by mouth daily. lisinopril (PRINIVIL, ZESTRIL) 30 mg tablet Take 30 mg by mouth two (2) times a day. diltiazem hcl 240 mg Tb24 Take 1 Tab by mouth two (2) times a day. Indications: HYPERTENSION      hydrochlorothiazide (HYDRODIURIL) 25 mg tablet Take 25 mg by mouth daily.     Indications: HYPERTENSION             Current Facility-Administered Medications   Medication Dose Route Frequency    vancomycin (VANCOCIN) 1500 mg in  ml infusion  1,500 mg IntraVENous Q18H    clindamycin (CLEOCIN) capsule 300 mg  300 mg Oral Q8H    HYDROcodone-acetaminophen (NORCO) 5-325 mg per tablet 1-2 Tablet  1-2 Tablet Oral Q4H PRN    insulin glargine (LANTUS) injection 20 Units  20 Units SubCUTAneous QHS    sodium chloride (NS) flush 5-10 mL  5-10 mL IntraVENous PRN    sodium chloride (NS) flush 5-40 mL  5-40 mL IntraVENous Q8H    sodium chloride (NS) flush 5-40 mL  5-40 mL IntraVENous PRN    acetaminophen (TYLENOL) tablet 650 mg  650 mg Oral Q4H PRN    aspirin chewable tablet 81 mg  81 mg Oral DAILY    gabapentin (NEURONTIN) capsule 900 mg  900 mg Oral TID    hydroCHLOROthiazide (HYDRODIURIL) tablet 25 mg  25 mg Oral DAILY    atorvastatin (LIPITOR) tablet 20 mg  20 mg Oral QHS    insulin lispro (HUMALOG) injection   SubCUTAneous AC&HS    glucose chewable tablet 16 g  4 Tablet Oral PRN    glucagon (GLUCAGEN) injection 1 mg  1 mg IntraMUSCular PRN    dextrose 10% infusion 0-250 mL  0-250 mL IntraVENous PRN    morphine injection 2 mg  2 mg IntraVENous Q3H PRN    dilTIAZem ER (CARDIZEM CD) capsule 240 mg  240 mg Oral DAILY    lisinopriL (PRINIVIL, ZESTRIL) tablet 40 mg  40 mg Oral DAILY       Allergies: Patient has no known allergies. Family History   Problem Relation Age of Onset    Cancer Maternal Grandmother      Social History     Socioeconomic History    Marital status:      Spouse name: Not on file    Number of children: Not on file    Years of education: Not on file    Highest education level: Not on file   Occupational History    Not on file   Tobacco Use    Smoking status: Never Smoker    Smokeless tobacco: Never Used   Substance and Sexual Activity    Alcohol use: No    Drug use: No    Sexual activity: Not on file   Other Topics Concern    Not on file   Social History Narrative    Not on file     Social Determinants of Health     Financial Resource Strain:     Difficulty of Paying Living Expenses: Not on file   Food Insecurity:     Worried About Running Out of Food in the Last Year: Not on file    Narcisa of Food in the Last Year: Not on file   Transportation Needs:     Lack of Transportation (Medical): Not on file    Lack of Transportation (Non-Medical):  Not on file   Physical Activity:     Days of Exercise per Week: Not on file    Minutes of Exercise per Session: Not on file   Stress:     Feeling of Stress : Not on file   Social Connections:     Frequency of Communication with Friends and Family: Not on file    Frequency of Social Gatherings with Friends and Family: Not on file    Attends Baptism Services: Not on file    Active Member of Clubs or Organizations: Not on file    Attends Club or Organization Meetings: Not on file    Marital Status: Not on file   Intimate Partner Violence:     Fear of Current or Ex-Partner: Not on file    Emotionally Abused: Not on file    Physically Abused: Not on file    Sexually Abused: Not on file   Housing Stability:     Unable to Pay for Housing in the Last Year: Not on file    Number of [place] in the Last Year: Not on file    Unstable Housing in the Last Year: Not on file     Social History     Tobacco Use   Smoking Status Never Smoker   Smokeless Tobacco Never Used        Temp (24hrs), Av.1 °F (36.7 °C), Min:97.9 °F (36.6 °C), Max:98.4 °F (36.9 °C)    Visit Vitals  /62 (BP 1 Location: Left upper arm, BP Patient Position: At rest)   Pulse 75   Temp 98.4 °F (36.9 °C)   Resp 18   Ht 5' 2\" (1.575 m)   Wt 103 kg (227 lb)   SpO2 99%   Breastfeeding No   BMI 41.52 kg/m²       ROS: 12 point ROS obtained in details. Pertinent positives as mentioned in HPI,   otherwise negative    Physical Exam:    General: Well developed, well nourished female sitting on the  bed AAOx3 in no acute distress. General:   awake alert and oriented   HEENT:  Normocephalic, atraumatic, EOMI, no scleral icterus or pallor; no conjunctival hemmohage;  nasal and oral mucous are moist and without evidence of lesions. No thrush. Dentition good. Neck supple, no bruits. Lymph Nodes:   no cervical, axillary or inguinal adenopathy   Lungs:   non-labored, bilaterally clear to auscultation- no crackles wheezes rales or rhonchi   Heart:  RRR, s1 and s2; no rubs or gallops, no edema, + pedal pulses   Abdomen:  soft, non-distended, active bowel sounds, no hepatomegaly, no splenomegaly. Non-tender   Genitourinary:  deferred   Extremities:   no clubbing, cyanosis; right ring finger surgical dressing not opened,  muscle mass appropriate for age   Neurologic:  No gross focal sensory abnormalities; 5/5 muscle strength to upper and lower extremities.  Speech appropriate. Cranial nerves intact                        Skin:  No rash or ulcers noted   Back:  no spinal or paraspinal muscle tenderness or rigidity, no CVA tenderness     Psychiatric:  No suicidal or homicidal ideations, appropriate mood and affect         Labs: Results:   Chemistry Recent Labs     03/08/22 0435 03/07/22  0101 03/06/22  0109 03/05/22  1120 03/05/22  1120   * 315* 216*   < > 302*   * 135* 137   < > 135*   K 3.9 3.5 3.5   < > 3.7    102 104   < > 100   CO2 29 28 28   < > 30   BUN 12 8 9   < > 11   CREA 0.64 0.75 0.59*   < > 0.65   CA 9.6 9.2 9.1   < > 9.1   AGAP 4 5 5   < > 5   BUCR 19 11* 15   < > 17   AP  --   --   --   --  78   TP  --   --   --   --  7.8   ALB  --   --   --   --  3.4   GLOB  --   --   --   --  4.4*   AGRAT  --   --   --   --  0.8    < > = values in this interval not displayed. CBC w/Diff Recent Labs     03/08/22  0435 03/07/22  0101 03/06/22  0109 03/05/22  1120 03/05/22  1120   WBC 9.7 11.9 11.0   < > 10.4   RBC 4.03* 3.99* 4.39   < > 4.42   HGB 10.7* 10.7* 11.3*   < > 12.0   HCT 33.3* 32.9* 36.0   < > 35.8    342 379   < > 340   GRANS  --   --   --   --  69   LYMPH  --   --   --   --  24   EOS  --   --   --   --  1    < > = values in this interval not displayed. Microbiology Recent Labs     03/06/22  1246 03/05/22  1135 03/05/22  1120   CULT NO ANAEROBES ISOLATED SO FAR  MODERATE STAPHYLOCOCCUS SPECIES* NO GROWTH 3 DAYS NO GROWTH 3 DAYS          RADIOLOGY:    All available imaging studies/reports in connect care for this admission were reviewed      Disclaimer: Sections of this note are dictated utilizing voice recognition software, which may have resulted in some phonetic based errors in grammar and contents. Even though attempts were made to correct all the mistakes, some may have been missed, and remained in the body of the document. If questions arise, please contact our department.     Dr. Blake Pulido, Infectious Disease Specialist  [phone]  March 8, 2022  8:18 AM

## 2022-03-08 NOTE — PROGRESS NOTES
Hospitalist Progress Note    Patient: Artie Murdock Age: 55 y.o. : 1965 MR#: [id number] SSN: [id number]  Date/Time: 3/8/2022 10:29 AM    DOA: 3/5/2022  PCP: Ian Lemon MD    Subjective: Tolerate wound change. Wound care noted  Wound culture grew MRSA. NO fever. ID recommendation for Linezolid but she needs prior authorization     Interval Hospital Course:  55 y.o female with HTN, h/o CVA, obesity,  Type 2 DM, presented to [facility] with right ring finger pain and swelling that has been worsened over the last day. 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 In the ER, xray of hand was without gas. She received IV zosyn while in [facility].         ROS:  No current fever/chills, no headache, no dizziness, no facial pain, no sinus congestion,   No swallowing pain, No chest pain, no palpitation, no shortness of breath, no abd pain,  No diarrhea, no urinary complaint, no leg pain or swelling, +right hand pain    Assessment/Plan:     1. Cellulitis of finger of right hand with underlying abscess         +intraoperative culture grew MRSA  2. HTN   3. Hyperglycemia with Type 2 DM, HgbA1c 10.9%  4. Morbid obesity BMI 41   5. Hyperlipidemia   6. Diabetic neuropathy   7. Anemia of blood loss, stable       Continue Vancomycin IV but will send home with Linezolid   Spoke with ID for antibiotic selection. Ortho follows for wound care.  Elevate extremity   Cont her lantus and ISS   Cont home medications as tolerated    ICS  Hold of heparin sq    Full code   Disposition planning: home in 1 day   Family updated (.NONE.)  Additional Notes:    Time spent > 30 minutes    Case discussed with:  [x]Patient  []Family  [x]Nursing  []Case Management  DVT Prophylaxis:  []Lovenox  []Hep SQ  [x]SCDs  []Coumadin   []On Heparin gtt    Signed By: Sergio Sharma MD     2022 10:29 AM              Objective:   VS:   Visit Vitals  /82 (BP 1 Location: Left upper arm, BP Patient Position: At rest)   Pulse 79 Temp 98.4 °F (36.9 °C)   Resp 18   Ht 5' 2\" (1.575 m)   Wt 103 kg (227 lb)   SpO2 95%   Breastfeeding No   BMI 41.52 kg/m²      Tmax/24hrs: Temp (24hrs), Av.2 °F (36.8 °C), Min:97.9 °F (36.6 °C), Max:98.4 °F (36.9 °C)      Intake/Output Summary (Last 24 hours) at 3/8/2022 1029  Last data filed at 3/8/2022 0600  Gross per 24 hour   Intake 60 ml   Output --   Net 60 ml       Tele:   General:  Cooperative, Not in acute distress, speaks in full sentence while in bed  HEENT: PERRL, EOMI, supple neck, no JVD, dry oral mucosa  Cardiovascular: S1S2 regular, no rub/gallop   Pulmonary: air entry bilaterally, no wheezing, no crackle  GI:  Soft, non tender, non distended, +bs, no guarding   Extremities:  No pedal edema, +distal pulses appreciated   Right ring finger/hand infection, indolent, ?abscess    Neuro: AOx3, moving all extremities, no gross deficit.      Additional:       Current Facility-Administered Medications   Medication Dose Route Frequency    vancomycin (VANCOCIN) 1500 mg in  ml infusion  1,500 mg IntraVENous Q18H    clindamycin (CLEOCIN) capsule 300 mg  300 mg Oral Q8H    HYDROcodone-acetaminophen (NORCO) 5-325 mg per tablet 1-2 Tablet  1-2 Tablet Oral Q4H PRN    insulin glargine (LANTUS) injection 20 Units  20 Units SubCUTAneous QHS    sodium chloride (NS) flush 5-10 mL  5-10 mL IntraVENous PRN    sodium chloride (NS) flush 5-40 mL  5-40 mL IntraVENous Q8H    sodium chloride (NS) flush 5-40 mL  5-40 mL IntraVENous PRN    acetaminophen (TYLENOL) tablet 650 mg  650 mg Oral Q4H PRN    aspirin chewable tablet 81 mg  81 mg Oral DAILY    gabapentin (NEURONTIN) capsule 900 mg  900 mg Oral TID    hydroCHLOROthiazide (HYDRODIURIL) tablet 25 mg  25 mg Oral DAILY    atorvastatin (LIPITOR) tablet 20 mg  20 mg Oral QHS    insulin lispro (HUMALOG) injection   SubCUTAneous AC&HS    glucose chewable tablet 16 g  4 Tablet Oral PRN    glucagon (GLUCAGEN) injection 1 mg  1 mg IntraMUSCular PRN    dextrose 10% infusion 0-250 mL  0-250 mL IntraVENous PRN    morphine injection 2 mg  2 mg IntraVENous Q3H PRN    dilTIAZem ER (CARDIZEM CD) capsule 240 mg  240 mg Oral DAILY    lisinopriL (PRINIVIL, ZESTRIL) tablet 40 mg  40 mg Oral DAILY            Lab/Data Review:  Labs: Results:       Chemistry Recent Labs     03/08/22  0435 03/07/22  0101 03/06/22 0109   * 315* 216*   * 135* 137   K 3.9 3.5 3.5    102 104   CO2 29 28 28   BUN 12 8 9   CREA 0.64 0.75 0.59*   BUCR 19 11* 15   AGAP 4 5 5   CA 9.6 9.2 9.1     Recent Labs     03/05/22  1120   ALT 18   TP 7.8   ALB 3.4   GLOB 4.4*   AGRAT 0.8      CBC w/Diff Recent Labs     03/08/22  0435 03/07/22  0101 03/07/22  0101 03/06/22  0109 03/06/22  0109 03/05/22  1120 03/05/22  1120   WBC 9.7  --  11.9  --  11.0   < > 10.4   RBC 4.03*  --  3.99*  --  4.39   < > 4.42   HGB 10.7*  --  10.7*  --  11.3*   < > 12.0   HCT 33.3*  --  32.9*  --  36.0   < > 35.8   MCV 82.6   < > 82.5   < > 82.0   < > 81.0   MCH 26.6   < > 26.8   < > 25.7   < > 27.1   MCHC 32.1   < > 32.5   < > 31.4   < > 33.5   RDW 13.3   < > 13.3   < > 13.3   < > 13.2     --  342  --  379   < > 340   GRANS  --   --   --   --   --   --  69   LYMPH  --   --   --   --   --   --  24   EOS  --   --   --   --   --   --  1    < > = values in this interval not displayed. Coagulation No results for input(s): PTP, INR, APTT, INREXT, INREXT in the last 72 hours.     Iron/Ferritin Lab Results   Component Value Date/Time    Iron 23 (L) 03/07/2022 01:01 AM    TIBC 230 (L) 03/07/2022 01:01 AM    Iron % saturation 10 (L) 03/07/2022 01:01 AM    Ferritin 109 03/07/2022 01:01 AM       BNP    Cardiac Enzymes Lab Results   Component Value Date/Time     10/30/2017 08:23 PM    CK - MB <1.0 10/30/2017 08:23 PM    CK-MB Index  10/30/2017 08:23 PM     CALCULATION NOT PERFORMED WHEN RESULT IS BELOW LINEAR LIMIT    Troponin-I, QT <0.02 10/30/2017 08:23 PM        Lactic Acid    Thyroid Studies All Micro Results     Procedure Component Value Units Date/Time    CULTURE, BLOOD [[id number]] Collected: 03/05/22 1120    Order Status: Completed Specimen: Blood Updated: 03/08/22 0650     Special Requests: NO SPECIAL REQUESTS        Culture result: NO GROWTH 3 DAYS       CULTURE, BLOOD [[id number]] Collected: 03/05/22 1135    Order Status: Completed Specimen: Blood Updated: 03/08/22 0650     Special Requests: NO SPECIAL REQUESTS        Culture result: NO GROWTH 3 DAYS       CULTURE, ANAEROBIC [[id number]] Collected: 03/06/22 1246    Order Status: Completed Specimen: Hand Updated: 03/07/22 1416     Special Requests: SURG RT HAND     Culture result:       NO ANAEROBES ISOLATED SO FAR          CULTURE, BODY FLUID Walker Face STAIN [[id number]]  (Abnormal) Collected: 03/06/22 1246    Order Status: Completed Specimen: Body Fluid from Hand Updated: 03/07/22 1409     Special Requests: SURG RT HAND     GRAM STAIN FEW WBCS SEEN               RARE GRAM POSITIVE COCCI IN PAIRS           Culture result:       MODERATE STAPHYLOCOCCUS SPECIES          CULTURE, MRSA [[id number]] Collected: 03/05/22 1645    Order Status: Canceled Specimen: Nasal     COVID-19 RAPID TEST [[id number]] Collected: 03/05/22 1415    Order Status: Completed Specimen: Nasopharyngeal Updated: 03/05/22 1541     Specimen source Nasopharyngeal        COVID-19 rapid test Not detected        Comment: Rapid Abbott ID Now       Rapid NAAT:  The specimen is NEGATIVE for SARS-CoV-2, the novel coronavirus associated with COVID-19. Negative results should be treated as presumptive and, if inconsistent with clinical signs and symptoms or necessary for patient management, should be tested with an alternative molecular assay. Negative results do not preclude SARS-CoV-2 infection and should not be used as the sole basis for patient management decisions.        This test has been authorized by the FDA under an Emergency Use Authorization (EUA) for use by authorized laboratories. Fact sheet for Healthcare Providers: FITiST.co.nz  Fact sheet for Patients: FITiST.co.nz       Methodology: Isothermal Nucleic Acid Amplification         COVID-19 RAPID TEST [[id number]]     Order Status: Canceled             Images:    CT (Most Recent). XRAY (Most Recent) XR Results (most recent):  Results from Hospital Encounter encounter on 03/05/22    XR 4TH FINGER RT MIN 2 V    Narrative  Right fourth digit radiographs    HISTORY: Redness and swelling right hand upon waking, pain and difficulty  bending fingers, extensive redness and swelling fourth digit. COMPARISON: None. FINDINGS:    Three views obtained. There is no fracture or dislocation. The joint spaces  are maintained. Mineralization is normal.   There is edema or inflammation at  the base of the fourth digit. No soft tissue gas. There is no radiopaque  foreign body. Impression  Edema or inflammation at the base of the fourth digit. No soft tissue gas or  bony abnormality. EKG No results found for this or any previous visit.      2D ECHO

## 2022-03-08 NOTE — DIABETES MGMT
Diabetes/ Glycemic Control Plan of Care    Recommendations:   1.) increase sched bedtime basal lantus insulin dose from 20 units to 25 units daily starting tonight. Order obtained. 2.) then monitor for pattern of elevated BG post prandial to determine need for mealtime bolus insulin in addition to correctional lispro. 3/08:  Seen patient this morning and discussed elevated BG values. Educated patient about the importance of BG control to help with wound healing and she verbalized understanding. POC BG 3/07: 296, 197, 288  POC BG 3/08 at time of review: 216    Assessment:  DX:   1. Cellulitis of finger of right hand        Fasting/ Morning blood glucose:   Lab Results   Component Value Date/Time    Glucose 177 (H) 03/08/2022 04:35 AM    Glucose (POC) 216 (H) 03/08/2022 08:16 AM    Glucose,  (H) 10/21/2016 07:12 AM     IV Fluids containing dextrose:  None    Steroids:   None    Blood glucose values: Within target range (70-180mg/dL): No    Current insulin orders:   Basal lantus insulin 25 units daily at bedtime  Correctional lispro insulin. Very resistant dose    Total Daily Dose previous 24 hours: 53 units of insulin  Lantus: 20 units   Lispro: 33 units    Lantus  Current A1c:   Lab Results   Component Value Date/Time    Hemoglobin A1c 10.9 (H) 02/04/2022 10:15 AM      equivalent  to ave Blood Glucose of 266 mg/dl for 2-3 months prior to admission    Adequate glycemic control PTA:  No.    Nutrition/Diet:   Active Orders   Diet    ADULT DIET Regular; 3 carb choices (45 gm/meal); No Concentrated Sweets      Meal Intake:  No data found. Supplement Intake:  No data found. Home diabetes medications:  Verified with patient on 3/07/2022  Key Antihyperglycemic Medications             insulin aspart U-100 (NovoLOG Flexpen U-100 Insulin) 100 unit/mL (3 mL) inpn (Taking) 20 Units by SubCUTAneous route two (2) times a day.     metFORMIN (GLUCOPHAGE) 500 mg tablet (Taking) Take 1,000 mg by mouth two (2) times daily (with meals). Plan/Goals:   Blood glucose will be within target of 70 - 180 mg/dl within 72 hours  Reinforce dietary and medication compliance at home.        Education:  [x] Refer to Diabetes Education Record: 3/07/2022                       [] Education not indicated at this time     Nida Camejo RN [facility]  Pager: [phone]

## 2022-03-08 NOTE — PROGRESS NOTES
Discharge/Transition Planning    1120: Faxed Zyvox prescription down to outpt pharmacy. They will notify CM if prior auth needed     1210: CM notified by outpt pharmacy that pt does need prior auth.  Have no information on forms or process of prior auth for this insurance so will have to contact insurance somehow when have time

## 2022-03-08 NOTE — PROGRESS NOTES
Discharge/Transition Planning    1120: Faxed Zyvox prescription down to outpt pharmacy. They will notify CM if prior auth needed     1210: CM notified by outpt pharmacy that pt does need prior auth. Have no information on forms or process of prior auth for this insurance so will have to contact insurance somehow when have time    1350: Got a help number from pharmacy [phone] and called it but it is for pharmacies only and not number to call for prior auth. Trying to google a prior auth phone number for patient insurance and there is so many [employer] cannot find correct . Will see if patient has her insurance card with her as it is not uploaded in chart     1400: Spoke with patient to see if she had insurance card with her and explained prior auth for medication. Spouse took wallet home.  Pt called spouse while CM in room and he can bring it into hospital but it wont be until CM is gone for day

## 2022-03-08 NOTE — PROGRESS NOTES
[facility]    Progress Note      Patient: Latasha [place]               Sex: female          DOA: 3/5/2022         YOB: 1965      Age:  55 y.o.        LOS:  LOS: 3 days         S/P  Procedure(s):  INCISION AND DRAINAGE RIGHT RING FINGER               Subjective: Moderate pain. States dressing change was very painful    Denies cp, sob, abd pain   Objective:      Blood pressure 125/62, pulse 75, temperature 98.4 °F (36.9 °C), resp. rate 18, height 5' 2\" (1.575 m), weight 227 lb (103 kg), SpO2 99 %, not currently breastfeeding.    Well developed, Well Nourished alert, cooperative, no distress  dressing in place, decrease in erythema today  swelling and tenderness noted in right hand  Sensory is intact   Motor is intact  nv intact  2+distal pulses  Neg calf tenderness    Labs:  CBC  @  CBC:   Lab Results   Component Value Date/Time    WBC 9.7 03/08/2022 04:35 AM    RBC 4.03 (L) 03/08/2022 04:35 AM    HGB 10.7 (L) 03/08/2022 04:35 AM    HCT 33.3 (L) 03/08/2022 04:35 AM    PLATELET 150 10/92/4591 04:35 AM     BMP:   Lab Results   Component Value Date/Time    Glucose 177 (H) 03/08/2022 04:35 AM    Sodium 135 (L) 03/08/2022 04:35 AM    Potassium 3.9 03/08/2022 04:35 AM    Chloride 102 03/08/2022 04:35 AM    CO2 29 03/08/2022 04:35 AM    BUN 12 03/08/2022 04:35 AM    Creatinine 0.64 03/08/2022 04:35 AM    Calcium 9.6 03/08/2022 04:35 AM   @  Coagulation  Lab Results   Component Value Date    INR 1.0 03/08/2013    APTT 36.8 03/08/2013      Basic Metabolic Profile  Lab Results   Component Value Date     (L) 03/08/2022    CO2 29 03/08/2022    BUN 12 03/08/2022     Results     Procedure Component Value Units Date/Time    CULTURE, ANAEROBIC [[id number]] Collected: 03/06/22 1246    Order Status: Completed Specimen: Hand Updated: 03/07/22 0006     Special Requests: SURG RT HAND     Culture result:       NO ANAEROBES ISOLATED SO FAR          CULTURE, BODY FLUID W GRAM STAIN [[id number]]  (Abnormal) Collected: 03/06/22 1246    Order Status: Completed Specimen: Body Fluid from Hand Updated: 03/07/22 1409     Special Requests: SURG RT HAND     GRAM STAIN FEW WBCS SEEN               RARE GRAM POSITIVE COCCI IN PAIRS           Culture result:       MODERATE STAPHYLOCOCCUS SPECIES          CULTURE, MRSA [[id number]] Collected: 03/05/22 1645    Order Status: Canceled Specimen: Nasal     COVID-19 RAPID TEST [[id number]] Collected: 03/05/22 1415    Order Status: Completed Specimen: Nasopharyngeal Updated: 03/05/22 1541     Specimen source Nasopharyngeal        COVID-19 rapid test Not detected        Comment: Rapid Abbott ID Now       Rapid NAAT:  The specimen is NEGATIVE for SARS-CoV-2, the novel coronavirus associated with COVID-19. Negative results should be treated as presumptive and, if inconsistent with clinical signs and symptoms or necessary for patient management, should be tested with an alternative molecular assay. Negative results do not preclude SARS-CoV-2 infection and should not be used as the sole basis for patient management decisions. This test has been authorized by the FDA under an Emergency Use Authorization (EUA) for use by authorized laboratories.    Fact sheet for Healthcare Providers: ConventionUpdate.co.nz  Fact sheet for Patients: ConventionUpdate.co.nz       Methodology: Isothermal Nucleic Acid Amplification         COVID-19 RAPID TEST [[id number]]     Order Status: Canceled     CULTURE, BLOOD [[id number]] Collected: 03/05/22 1135    Order Status: Completed Specimen: Blood Updated: 03/08/22 0650     Special Requests: NO SPECIAL REQUESTS        Culture result: NO GROWTH 3 DAYS       CULTURE, BLOOD [[id number]] Collected: 03/05/22 1120    Order Status: Completed Specimen: Blood Updated: 03/08/22 0650     Special Requests: NO SPECIAL REQUESTS        Culture result: NO GROWTH 3 DAYS           Medications Reviewed      Assessment/Plan     Principal Problem:    Cellulitis of finger of right hand (3/5/2022)    Active Problems:    HTN (hypertension) (3/5/2022)      DM (diabetes mellitus) ([facility] (3/5/2022)        Procedure(s):  INCISION AND DRAINAGE RIGHT RING FINGER :     1. PT and/or OT Consults: YES continue PT/OT: oob to chair, gentle rom   rom hand and fingers                                              2. Incision Care:  Routine Incision Care and Dressing Changes : daily repacking with Nu Guaze, xeroform then kerlex  3. Pain control  4. heparin for DVT prophylaxis  5. Follow cultures. Recommend consult to ID for abx assistance. Wound care consulted for help with dressing changes    4.  DISCHARGE PLANNING     Intervention : [address] [facility]  [phone]

## 2022-03-08 NOTE — PROGRESS NOTES
Problem: Self Care Deficits Care Plan (Adult)  Goal: *Acute Goals and Plan of Care (Insert Text)  Description: Occupational Therapy Goals  Initiated 3/7/2022 within 7 day(s). 1.  Patient will participate in upper extremity therapeutic exercise/activities with modified independence for 8-10 minutes to prevent edema and maintain function for ADLs. 2.  Patient will utilize energy conservation techniques during functional activities with verbal cues. 3.  Patient will perform edema management techniques for 5- 8 minutes with minimal verbal cues to increase independence with ADLs  4. Patient will perform functional fine motor activity for 5-8 minutes with modified independence. Prior Level of Function: Patient was independent with self-care and functional mobility KIMBERLY Almanzar currently working at [employer], very active  Outcome: Progressing Towards Goal   OCCUPATIONAL THERAPY TREATMENT    Patient: Ian Singh (55 y.o. female)  Date: 3/8/2022  Diagnosis: Cellulitis of finger of right hand [L03.011] Cellulitis of finger of right hand  Procedure(s) (LRB):  INCISION AND DRAINAGE RIGHT RING FINGER (Right) 2 Days Post-Op  Precautions:  (standard)    Chart, occupational therapy assessment, plan of care, and goals were reviewed. ASSESSMENT:  Patient alert and motivated to participate in therapy session to increase function in right hand. Therapeutic exercises completed without difficulty and blue foam block given for min resistance. Discussed edema management with exercises and elevation. JOJO elevated on two pillows for exercises and at end of session. She was left seated in the recliner with all needs met.    Progression toward goals:  [x]          Improving appropriately and progressing toward goals  []          Improving slowly and progressing toward goals  []          Not making progress toward goals and plan of care will be adjusted     PLAN:  Patient continues to benefit from skilled intervention to address the above impairments. Continue treatment per established plan of care. Discharge Recommendations:  Outpatient hand therapy  Further Equipment Recommendations for Discharge:  N/A     SUBJECTIVE:   Patient stated I just had some pain medicine a little while ago.     OBJECTIVE DATA SUMMARY:   Cognitive/Behavioral Status:  Neurologic State: Alert  Orientation Level: Oriented X4  Cognition: Appropriate decision making,Follows commands  Safety/Judgement: Awareness of environment,Good awareness of safety precautions    UE Therapeutic Exercises:   Patient performed AROM with right wrist/forearm in flexion/extension, supination/pronation and radial/ulnar deviation x10 reps each. She completed gentle AROM with right digits including fingertip to thumb tapping, followed by AAROM to all but fourth digit to reach full ROM. Patient advised to use pain as her guide but was able to achieve full ROM in unwrapped digits. Increased AROM noted over time. Blue foam block given to patient, after demonstration, for finger flexion and pinching exercises to increase ROM, strength and fine motor coordination. All exercises performed with RUE elevated on two pillows. Pain:  Pain level pre-treatment: 5/10, in right hand   Pain level post-treatment: 5/10, in right hand  Pain Intervention(s): Medication (see MAR); Rest, Repositioning  Response to intervention: Nurse notified, See doc flow    Activity Tolerance:    Good  Please refer to the flowsheet for vital signs taken during this treatment. After treatment:   [x]  Patient left in no apparent distress sitting up in chair  []  Patient left in no apparent distress in bed  [x]  Call bell left within reach  [x]  Nursing notified  []  Caregiver present  []  Bed alarm activated    COMMUNICATION/EDUCATION:   [x] Role of Occupational Therapy in the acute care setting  [x] Home safety education was provided and the patient/caregiver indicated understanding.   [x] Patient/family have participated as able in working towards goals and plan of care. [x] Patient/family agree to work toward stated goals and plan of care. [] Patient understands intent and goals of therapy, but is neutral about his/her participation. [] Patient is unable to participate in goal setting and plan of care.       Thank you for this referral.  Liam Jensen MS OTR/L   Time Calculation: 24 mins

## 2022-03-09 VITALS
HEIGHT: 62 IN | TEMPERATURE: 98 F | BODY MASS INDEX: 41.77 KG/M2 | RESPIRATION RATE: 18 BRPM | DIASTOLIC BLOOD PRESSURE: 80 MMHG | SYSTOLIC BLOOD PRESSURE: 131 MMHG | HEART RATE: 74 BPM | WEIGHT: 227 LBS | OXYGEN SATURATION: 100 %

## 2022-03-09 LAB
ANION GAP SERPL CALC-SCNC: 3 MMOL/L (ref 3–18)
BUN SERPL-MCNC: 13 MG/DL (ref 7–18)
BUN/CREAT SERPL: 18 (ref 12–20)
CALCIUM SERPL-MCNC: 9.3 MG/DL (ref 8.5–10.1)
CHLORIDE SERPL-SCNC: 102 MMOL/L (ref 100–111)
CO2 SERPL-SCNC: 32 MMOL/L (ref 21–32)
CREAT SERPL-MCNC: 0.71 MG/DL (ref 0.6–1.3)
ERYTHROCYTE [DISTWIDTH] IN BLOOD BY AUTOMATED COUNT: 13 % (ref 11.6–14.5)
GLUCOSE BLD STRIP.AUTO-MCNC: 180 MG/DL (ref 70–110)
GLUCOSE BLD STRIP.AUTO-MCNC: 234 MG/DL (ref 70–110)
GLUCOSE SERPL-MCNC: 246 MG/DL (ref 74–99)
HCT VFR BLD AUTO: 32.5 % (ref 35–45)
HGB BLD-MCNC: 10.2 G/DL (ref 12–16)
MCH RBC QN AUTO: 25.8 PG (ref 24–34)
MCHC RBC AUTO-ENTMCNC: 31.4 G/DL (ref 31–37)
MCV RBC AUTO: 82.3 FL (ref 78–100)
NRBC # BLD: 0 K/UL (ref 0–0.01)
NRBC BLD-RTO: 0 PER 100 WBC
PLATELET # BLD AUTO: 356 K/UL (ref 135–420)
PMV BLD AUTO: 10.5 FL (ref 9.2–11.8)
POTASSIUM SERPL-SCNC: 4 MMOL/L (ref 3.5–5.5)
RBC # BLD AUTO: 3.95 M/UL (ref 4.2–5.3)
SODIUM SERPL-SCNC: 137 MMOL/L (ref 136–145)
WBC # BLD AUTO: 8.2 K/UL (ref 4.6–13.2)

## 2022-03-09 PROCEDURE — 80048 BASIC METABOLIC PNL TOTAL CA: CPT

## 2022-03-09 PROCEDURE — 74011250637 HC RX REV CODE- 250/637: Performed by: PHYSICIAN ASSISTANT

## 2022-03-09 PROCEDURE — 74011250637 HC RX REV CODE- 250/637: Performed by: INTERNAL MEDICINE

## 2022-03-09 PROCEDURE — 74011636637 HC RX REV CODE- 636/637: Performed by: PHYSICIAN ASSISTANT

## 2022-03-09 PROCEDURE — 36415 COLL VENOUS BLD VENIPUNCTURE: CPT

## 2022-03-09 PROCEDURE — 99239 HOSP IP/OBS DSCHRG MGMT >30: CPT | Performed by: INTERNAL MEDICINE

## 2022-03-09 PROCEDURE — 74011000250 HC RX REV CODE- 250: Performed by: PHYSICIAN ASSISTANT

## 2022-03-09 PROCEDURE — 85027 COMPLETE CBC AUTOMATED: CPT

## 2022-03-09 PROCEDURE — 82962 GLUCOSE BLOOD TEST: CPT

## 2022-03-09 PROCEDURE — 74011250636 HC RX REV CODE- 250/636: Performed by: INTERNAL MEDICINE

## 2022-03-09 RX ORDER — INSULIN ASPART 100 [IU]/ML
25 INJECTION, SOLUTION INTRAVENOUS; SUBCUTANEOUS
Qty: 5 ADJUSTABLE DOSE PRE-FILLED PEN SYRINGE | Refills: 1 | Status: SHIPPED | OUTPATIENT
Start: 2022-03-09

## 2022-03-09 RX ORDER — OXYCODONE HYDROCHLORIDE 5 MG/1
5-10 TABLET ORAL
Qty: 60 TABLET | Refills: 0 | Status: SHIPPED | OUTPATIENT
Start: 2022-03-09 | End: 2022-03-16

## 2022-03-09 RX ORDER — ACETAMINOPHEN 500 MG
1000 TABLET ORAL EVERY 8 HOURS
Qty: 30 TABLET | Refills: 0 | Status: SHIPPED | OUTPATIENT
Start: 2022-03-09 | End: 2022-03-14

## 2022-03-09 RX ADMIN — MORPHINE SULFATE 2 MG: 2 INJECTION, SOLUTION INTRAMUSCULAR; INTRAVENOUS at 08:42

## 2022-03-09 RX ADMIN — Medication 6 UNITS: at 08:41

## 2022-03-09 RX ADMIN — ASPIRIN 81 MG 81 MG: 81 TABLET ORAL at 08:42

## 2022-03-09 RX ADMIN — SODIUM CHLORIDE, PRESERVATIVE FREE 10 ML: 5 INJECTION INTRAVENOUS at 06:16

## 2022-03-09 RX ADMIN — Medication 3 UNITS: at 12:21

## 2022-03-09 RX ADMIN — DILTIAZEM HYDROCHLORIDE 240 MG: 240 CAPSULE, COATED, EXTENDED RELEASE ORAL at 08:42

## 2022-03-09 RX ADMIN — ACETAMINOPHEN 1000 MG: 500 TABLET ORAL at 06:15

## 2022-03-09 RX ADMIN — GABAPENTIN 900 MG: 300 CAPSULE ORAL at 08:42

## 2022-03-09 RX ADMIN — LISINOPRIL 40 MG: 20 TABLET ORAL at 08:41

## 2022-03-09 RX ADMIN — OXYCODONE 10 MG: 5 TABLET ORAL at 06:19

## 2022-03-09 RX ADMIN — HYDROCHLOROTHIAZIDE 25 MG: 25 TABLET ORAL at 08:42

## 2022-03-09 NOTE — PROGRESS NOTES
Infectious Disease progress Note        Reason: Abscess right ring finger    Current abx Prior abx    vancomycin since 3/5   Ceftriaxone 3/5-3/7  Clindamycin  3/6-3/7     Lines:       Assessment :    55 y.o. female with a PMHx of HTN, uncontrolled type 2 diabetes-last hemoglobin A1c 10.9 on 2/4/2022, CVA, AKI who presented to the ED on 3/5/22 with c/o redness, severe pain, swelling to right ring finger associated with decreased ROM. Clinical presentation consistent with deep abscess dorsal aspect right ring finger  Patient has been appropriately managed with I&D right ring finger on 3/6/2022  Intra-Op cultures 3/6-[facility]SA. Susceptibilities reviewed    Intra-Op findings noted. No evidence of bone involvement. No evidence of PIP joint involvement. Clinically better. Improved erythema/swelling right ring finger      Recommendations:    1.    discontinue vancomycin upon discharge  2. Follow-up Ortho recommendations regarding wound care  3.   switch to to p.o. linezolid till 3/19/2022 once linezolid arranged    Linezolid prescription in chart. Discussed with      Above plan was discussed in details with patient,  and dr Karlo Causey. Please call me if any further questions or concerns. Will continue to participate in the care of this patient. HPI:    Feels better. No new complaints she denies fever, cp, sob, cough, abd pain, nvd.        Past Medical History:   Diagnosis Date    Arthritis     Diabetes ([facility]     Hypertension     Morbid obesity ([facility]     BMI-50    Psychiatric disorder     CLAUSTROPHOBIA    Stroke ([facility] 2006    no residuals    Unspecified sleep apnea     uses cpap    Vitamin D deficiency        Past Surgical History:   Procedure Laterality Date    COLONOSCOPY N/A 10/7/2016    COLONOSCOPY aborted poor prep performed by Kurt Dee MD at [address] COLONOSCOPY N/A 10/21/2016    COLONOSCOPY performed by Azam Hartmann MD at [address] HX HYSTERECTOMY  2006    HX KNEE ARTHROSCOPY  2005 AND 2012    RIGHT KNEE IN 2005, LEFT KNEE IN 2012    NM BREAST SURGERY PROCEDURE UNLISTED  1970'S    LEFT BREAST BX X2     US GUIDED CORE BREAST BIOPSY      LT. br. 2016 benign fat necrosis       Current Discharge Medication List      START taking these medications    Details   linezolid (ZYVOX) 600 mg tablet Take 1 Tablet by mouth two (2) times a day for 12 days. Indications: skin infection due to Staphylococcus aureus bacteria  Qty: 24 Tablet, Refills: 0         CONTINUE these medications which have NOT CHANGED    Details   insulin aspart U-100 (NovoLOG Flexpen U-100 Insulin) 100 unit/mL (3 mL) inpn 20 Units by SubCUTAneous route two (2) times a day. simvastatin (ZOCOR) 40 mg tablet Take 40 mg by mouth nightly. diclofenac EC (VOLTAREN) 75 mg EC tablet Take 75 mg by mouth two (2) times a day. metFORMIN (GLUCOPHAGE) 500 mg tablet Take 1,000 mg by mouth two (2) times daily (with meals). gabapentin (NEURONTIN) 300 mg capsule Take 900 mg by mouth three (3) times daily. Indications: neuropathic pain      aspirin 81 mg chewable tablet Take 81 mg by mouth daily. lisinopril (PRINIVIL, ZESTRIL) 30 mg tablet Take 30 mg by mouth two (2) times a day. diltiazem hcl 240 mg Tb24 Take 1 Tab by mouth two (2) times a day. Indications: HYPERTENSION      hydrochlorothiazide (HYDRODIURIL) 25 mg tablet Take 25 mg by mouth daily.     Indications: HYPERTENSION             Current Facility-Administered Medications   Medication Dose Route Frequency    acetaminophen (TYLENOL) tablet 1,000 mg  1,000 mg Oral Q8H    oxyCODONE IR (ROXICODONE) tablet 10 mg  10 mg Oral Q4H PRN    morphine injection 2 mg  2 mg IntraVENous Q4H PRN    insulin glargine (LANTUS) injection 25 Units  25 Units SubCUTAneous QHS    vancomycin (VANCOCIN) 1500 mg in  ml infusion  1,500 mg IntraVENous Q18H    sodium chloride (NS) flush 5-10 mL  5-10 mL IntraVENous PRN    sodium chloride (NS) flush 5-40 mL 5-40 mL IntraVENous Q8H    sodium chloride (NS) flush 5-40 mL  5-40 mL IntraVENous PRN    acetaminophen (TYLENOL) tablet 650 mg  650 mg Oral Q4H PRN    aspirin chewable tablet 81 mg  81 mg Oral DAILY    gabapentin (NEURONTIN) capsule 900 mg  900 mg Oral TID    hydroCHLOROthiazide (HYDRODIURIL) tablet 25 mg  25 mg Oral DAILY    atorvastatin (LIPITOR) tablet 20 mg  20 mg Oral QHS    insulin lispro (HUMALOG) injection   SubCUTAneous AC&HS    glucose chewable tablet 16 g  4 Tablet Oral PRN    glucagon (GLUCAGEN) injection 1 mg  1 mg IntraMUSCular PRN    dextrose 10% infusion 0-250 mL  0-250 mL IntraVENous PRN    dilTIAZem ER (CARDIZEM CD) capsule 240 mg  240 mg Oral DAILY    lisinopriL (PRINIVIL, ZESTRIL) tablet 40 mg  40 mg Oral DAILY       Allergies: Patient has no known allergies. Family History   Problem Relation Age of Onset    Cancer Maternal Grandmother      Social History     Socioeconomic History    Marital status:      Spouse name: Not on file    Number of children: Not on file    Years of education: Not on file    Highest education level: Not on file   Occupational History    Not on file   Tobacco Use    Smoking status: Never Smoker    Smokeless tobacco: Never Used   Substance and Sexual Activity    Alcohol use: No    Drug use: No    Sexual activity: Not on file   Other Topics Concern    Not on file   Social History Narrative    Not on file     Social Determinants of Health     Financial Resource Strain:     Difficulty of Paying Living Expenses: Not on file   Food Insecurity:     Worried About Running Out of Food in the Last Year: Not on file    Narcisa of Food in the Last Year: Not on file   Transportation Needs:     Lack of Transportation (Medical): Not on file    Lack of Transportation (Non-Medical):  Not on file   Physical Activity:     Days of Exercise per Week: Not on file    Minutes of Exercise per Session: Not on file   Stress:     Feeling of Stress : Not on file   Social Connections:     Frequency of Communication with Friends and Family: Not on file    Frequency of Social Gatherings with Friends and Family: Not on file    Attends Orthodox Services: Not on file    Active Member of Clubs or Organizations: Not on file    Attends Club or Organization Meetings: Not on file    Marital Status: Not on file   Intimate Partner Violence:     Fear of Current or Ex-Partner: Not on file    Emotionally Abused: Not on file    Physically Abused: Not on file    Sexually Abused: Not on file   Housing Stability:     Unable to Pay for Housing in the Last Year: Not on file    Number of [place] in the Last Year: Not on file    Unstable Housing in the Last Year: Not on file     Social History     Tobacco Use   Smoking Status Never Smoker   Smokeless Tobacco Never Used        Temp (24hrs), Av °F (36.7 °C), Min:97.4 °F (36.3 °C), Max:98.4 °F (36.9 °C)    Visit Vitals  /72 (BP 1 Location: Left upper arm, BP Patient Position: At rest)   Pulse 65   Temp 98.2 °F (36.8 °C)   Resp 18   Ht 5' 2\" (1.575 m)   Wt 103 kg (227 lb)   SpO2 99%   Breastfeeding No   BMI 41.52 kg/m²       ROS: 12 point ROS obtained in details. Pertinent positives as mentioned in HPI,   otherwise negative    Physical Exam:    General: Well developed, well nourished female sitting on the  bed AAOx3 in no acute distress. General:   awake alert and oriented   HEENT:  Normocephalic, atraumatic, EOMI, no scleral icterus or pallor; no conjunctival hemmohage;  nasal and oral mucous are moist and without evidence of lesions. Neck supple, no bruits. Lymph Nodes:   not examined   Lungs:   non-labored, bilateral chest movements equal   Heart:  RRR, s1 and s2   Abdomen:  soft, non-distended, active bowel sounds, no hepatomegaly, no splenomegaly.  Non-tender   Genitourinary:  deferred   Extremities:   no clubbing, cyanosis; right ring finger with improved erythema/swelling,  muscle mass appropriate for age Neurologic:  No gross focal sensory or motor abnormalities; Speech appropriate. Skin:   Surgical changes right hand   Back:  no obvious deformity     Psychiatric:  appropriate mood and affect         Labs: Results:   Chemistry Recent Labs     03/09/22  0133 03/08/22  0435 03/07/22  0101   * 177* 315*    135* 135*   K 4.0 3.9 3.5    102 102   CO2 32 29 28   BUN 13 12 8   CREA 0.71 0.64 0.75   CA 9.3 9.6 9.2   AGAP 3 4 5   BUCR 18 19 11*      CBC w/Diff Recent Labs     03/09/22  0133 03/08/22 0435 03/07/22  0101   WBC 8.2 9.7 11.9   RBC 3.95* 4.03* 3.99*   HGB 10.2* 10.7* 10.7*   HCT 32.5* 33.3* 32.9*    328 342      Microbiology Recent Labs     03/06/22  1246   CULT NO ANAEROBES ISOLATED  MODERATE * METHICILLIN RESISTANT STAPHYLOCOCCUS AUREUS **  (NOTE) ;;[facility] NOTIFIED DR ROTHMAN ON 3/8/22 AT 1059.           RADIOLOGY:    All available imaging studies/reports in [facility] for this admission were reviewed      Disclaimer: Sections of this note are dictated utilizing voice recognition software, which may have resulted in some phonetic based errors in grammar and contents. Even though attempts were made to correct all the mistakes, some may have been missed, and remained in the body of the document. If questions arise, please contact our department.     Dr. Nichol Cabrera, Infectious Disease Specialist  [phone]  March 9, 2022  8:18 AM

## 2022-03-09 NOTE — PROGRESS NOTES
Problem: Diabetes Self-Management  Goal: *Disease process and treatment process  Description: Define diabetes and identify own type of diabetes; list 3 options for treating diabetes. Outcome: Progressing Towards Goal  Goal: *Incorporating nutritional management into lifestyle  Description: Describe effect of type, amount and timing of food on blood glucose; list 3 methods for planning meals. Outcome: Progressing Towards Goal  Goal: *Incorporating physical activity into lifestyle  Description: State effect of exercise on blood glucose levels. Outcome: Progressing Towards Goal  Goal: *Developing strategies to promote health/change behavior  Description: Define the ABC's of diabetes; identify appropriate screenings, schedule and personal plan for screenings. Outcome: Progressing Towards Goal  Goal: *Using medications safely  Description: State effect of diabetes medications on diabetes; name diabetes medication taking, action and side effects. Outcome: Progressing Towards Goal  Goal: *Monitoring blood glucose, interpreting and using results  Description: Identify recommended blood glucose targets  and personal targets. Outcome: Progressing Towards Goal  Goal: *Prevention, detection, treatment of acute complications  Description: List symptoms of hyper- and hypoglycemia; describe how to treat low blood sugar and actions for lowering  high blood glucose level. Outcome: Progressing Towards Goal  Goal: *Prevention, detection and treatment of chronic complications  Description: Define the natural course of diabetes and describe the relationship of blood glucose levels to long term complications of diabetes.   Outcome: Progressing Towards Goal  Goal: *Developing strategies to address psychosocial issues  Description: Describe feelings about living with diabetes; identify support needed and support network  Outcome: Progressing Towards Goal  Goal: *Insulin pump training  Outcome: Progressing Towards Goal  Goal: *Sick day guidelines  Outcome: Progressing Towards Goal  Goal: *Patient Specific Goal (EDIT GOAL, INSERT TEXT)  Outcome: Progressing Towards Goal     Problem: Patient Education: Go to Patient Education Activity  Goal: Patient/Family Education  Outcome: Progressing Towards Goal     Problem: Falls - Risk of  Goal: *Absence of Falls  Description: Document Clearence Skates Fall Risk and appropriate interventions in the flowsheet. Outcome: Progressing Towards Goal  Note: Fall Risk Interventions:  Mobility Interventions: Bed/chair exit alarm,Patient to call before getting OOB         Medication Interventions: Evaluate medications/consider consulting pharmacy,Patient to call before getting OOB,Teach patient to arise slowly    Elimination Interventions: Call light in reach,Patient to call for help with toileting needs,Toileting schedule/hourly rounds    History of Falls Interventions: Evaluate medications/consider consulting pharmacy         Problem: Patient Education: Go to Patient Education Activity  Goal: Patient/Family Education  Outcome: Progressing Towards Goal     Problem: Patient Education: Go to Patient Education Activity  Goal: Patient/Family Education  Outcome: Progressing Towards Goal     Problem: Risk for Spread of Infection  Goal: Prevent transmission of infectious organism to others  Description: Prevent the transmission of infectious organisms to other patients, staff members, and visitors.   Outcome: Progressing Towards Goal     Problem: Patient Education:  Go to Education Activity  Goal: Patient/Family Education  Outcome: Progressing Towards Goal

## 2022-03-09 NOTE — PROGRESS NOTES
[facility]    Progress Note      Patient: Marla Blas               Sex: female          DOA: 3/5/2022         YOB: 1965      Age:  55 y.o.        LOS:  LOS: 4 days         S/P  Procedure(s):  INCISION AND DRAINAGE RIGHT RING FINGER               Subjective: Moderate pain but doing better. Ready to go home. Denies cp, sob, abd pain   Objective:      Blood pressure (!) 143/90, pulse 70, temperature 97.7 °F (36.5 °C), resp. rate 18, height 5' 2\" (1.575 m), weight 227 lb (103 kg), SpO2 98 %, not currently breastfeeding.    Well developed, Well Nourished alert, cooperative, no distress  dressing in place, decrease in erythema and swelling today  swelling and tenderness noted in right hand  Sensory is intact   Motor is intact  nv intact  2+distal pulses  Neg calf tenderness    Labs:  CBC  @  CBC:   Lab Results   Component Value Date/Time    WBC 8.2 03/09/2022 01:33 AM    RBC 3.95 (L) 03/09/2022 01:33 AM    HGB 10.2 (L) 03/09/2022 01:33 AM    HCT 32.5 (L) 03/09/2022 01:33 AM    PLATELET 525 87/76/3981 01:33 AM     BMP:   Lab Results   Component Value Date/Time    Glucose 246 (H) 03/09/2022 01:33 AM    Sodium 137 03/09/2022 01:33 AM    Potassium 4.0 03/09/2022 01:33 AM    Chloride 102 03/09/2022 01:33 AM    CO2 32 03/09/2022 01:33 AM    BUN 13 03/09/2022 01:33 AM    Creatinine 0.71 03/09/2022 01:33 AM    Calcium 9.3 03/09/2022 01:33 AM   @  Coagulation  Lab Results   Component Value Date    INR 1.0 03/08/2013    APTT 36.8 03/08/2013      Basic Metabolic Profile  Lab Results   Component Value Date     03/09/2022    CO2 32 03/09/2022    BUN 13 03/09/2022     Results     Procedure Component Value Units Date/Time    CULTURE, ANAEROBIC [[id number]] Collected: 03/06/22 1246    Order Status: Completed Specimen: Hand Updated: 03/08/22 1101     Special Requests: SURG RT HAND     Culture result: NO ANAEROBES ISOLATED       CULTURE, BODY FLUID Sophie Ingram STAIN [[id number]] (Abnormal)  (Susceptibility) Collected: 03/06/22 1246    Order Status: Completed Specimen: Body Fluid from Hand Updated: 03/08/22 1100     Special Requests: SURG RT HAND     GRAM STAIN FEW WBCS SEEN               RARE GRAM POSITIVE COCCI IN PAIRS           Culture result:       MODERATE * METHICILLIN RESISTANT STAPHYLOCOCCUS AUREUS *            (NOTE) ;;MRSA NOTIFIED DR ROTHMAN ON 3/8/22 AT 1059. [facility]    Susceptibility      Staphylococcus aureus Methcillin Resistant     LORENZO     Ciprofloxacin ($) Susceptible     Clindamycin ($) Susceptible     Daptomycin ($$$$$) Susceptible     Doxycycline ($$) Susceptible     Erythromycin ($$$$) Resistant     Gentamicin ($) Susceptible     Levofloxacin ($) Susceptible     Linezolid ($$$$$) Susceptible     Oxacillin Resistant     Tetracycline Susceptible     Trimeth/Sulfa Resistant     Vancomycin ($) Susceptible  [1]                  [1]  For LORENZO >1, consider using another antibiotic. Linear View                   CULTURE, MRSA [[id number]] Collected: 03/05/22 1645    Order Status: Canceled Specimen: Nasal     COVID-19 RAPID TEST [[id number]] Collected: 03/05/22 1415    Order Status: Completed Specimen: Nasopharyngeal Updated: 03/05/22 1541     Specimen source Nasopharyngeal        COVID-19 rapid test Not detected        Comment: Rapid Abbott ID Now       Rapid NAAT:  The specimen is NEGATIVE for SARS-CoV-2, the novel coronavirus associated with COVID-19. Negative results should be treated as presumptive and, if inconsistent with clinical signs and symptoms or necessary for patient management, should be tested with an alternative molecular assay. Negative results do not preclude SARS-CoV-2 infection and should not be used as the sole basis for patient management decisions. This test has been authorized by the FDA under an Emergency Use Authorization (EUA) for use by authorized laboratories.    Fact sheet for Healthcare Providers: ConventionUpdate.co.nz  Fact sheet for Patients: ConventionUpdate.co.nz       Methodology: Isothermal Nucleic Acid Amplification         COVID-19 RAPID TEST [[id number]]     Order Status: Canceled     CULTURE, BLOOD [[id number]] Collected: 03/05/22 1135    Order Status: Completed Specimen: Blood Updated: 03/09/22 0648     Special Requests: NO SPECIAL REQUESTS        Culture result: NO GROWTH 4 DAYS       CULTURE, BLOOD [[id number]] Collected: 03/05/22 1120    Order Status: Completed Specimen: Blood Updated: 03/09/22 0648     Special Requests: NO SPECIAL REQUESTS        Culture result: NO GROWTH 4 DAYS           Medications Reviewed      Assessment/Plan     Principal Problem:    Cellulitis of finger of right hand (3/5/2022)    Active Problems:    HTN (hypertension) (3/5/2022)      DM (diabetes mellitus) ([facility] [address] (3/5/2022)        Procedure(s):  INCISION AND DRAINAGE RIGHT RING FINGER :     1. PT and/or OT Consults: YES continue PT/OT: oob to chair, gentle rom   rom hand and fingers                                              2. Incision Care:  Routine Incision Care and Dressing Changes : daily repacking with estee Mcguire then kerlex - discussed with nursing staff that dressing to be changed before discharge to home today  3. Pain control  4. heparin for DVT prophylaxis  5. Follow cultures. Recommend consult to ID for abx assistance. Wound care consulted for help with dressing changes    Ok for discharge today. [facility] F/u with ortho on Monday or Tuesday    4.  DISCHARGE PLANNING     Intervention : [id number]  [phone]

## 2022-03-09 NOTE — PROGRESS NOTES
Discharge Summary    Patient: Leonel Storm               Sex: female          DOA: 3/5/2022         YOB: 1965      Age:  55 y.o.        LOS:  LOS: 4 days                Admit Date: 3/5/2022    Discharge Date: 3/9/2022    Primary care physician: Jayleen Keen MD    Discharge Diagnoses:    1. Cellulitis of finger of right hand with underlying abscess       +post INCISION AND DRAINAGE RIGHT RING FINGER (3/6/22)        +intraoperative culture grew MRSA  2. Hypertension   3. Hyperglycemia with Type 2 DM, HgbA1c 10.9%  4. Morbid obesity BMI 41   5. Hyperlipidemia   6. Diabetic neuropathy   7. Anemia of blood loss, stable         Discharge Condition: Good  Disposition: home with home health   Code Status: full code     Follow up for Primary Care Physician:  1)  Follow up with Dr Dustin Abraham in 5 days for check up on her wound. She is to keep her wound dressing on, keep it dry. 2)  She continues on oral antibiotic as prescribed by ID to completion   3)  She has been prescribed as needed narcotic for home use   4)  She can go back to work after [employer] of next week with limited use to her right hand     Hospital Course:   55 y.o female with HTN, h/o CVA, obesity,  Type 2 DM, presented to [facility] with right ring finger pain and swelling that has been worsened over the last day. 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 In the ER, xray of hand was without gas. She received IV zosyn while in [facility]. Clinically, her infection was quickly spreading from her finger and there was concern for deep abscess formation in the dorsal aspect of her right ring finger. orthor surgery consulted and performed I&D of right finger on 3/6/22. No evidence of bone involvement, no evidence of PIP joint involvement. Due to her rapid spread, clindamycin was added to her IV antibiotics for toxin producing organisms concern. ID consulted. Her intraoperative culture grew MRSA.  Her symptom improved with hand exercise and IV antibiotics. Her pain was managed. She was seen daily by ortho for wound care. ID recommended Linezolid at discharge. She has underlying type 2 DM that has not been control. Her insulin has been increased to meet her glycemic goal while inpatient. She has anemia due to blood loss but no transfusion needed       Last 24 Hours: she tolerated her wound change this AM. Her pain is controlled. No fever. Case management to help her acquired prior auth for her Linezolid  Ortho recommended to follow up in clinic on Monday for wound check    ROS:  No current fever/chills, no headache, no dizziness, no facial pain, no sinus congestion,   No swallowing pain, No chest pain, no palpitation, no shortness of breath, no abd pain,  No diarrhea, no urinary complaint, no leg pain or swelling, +right hand swelling and pain    VS:   Visit Vitals  BP (!) 143/90 (BP 1 Location: Left upper arm, BP Patient Position: At rest) Comment: Reported to RN Stella   Pulse 70   Temp 97.7 °F (36.5 °C)   Resp 18   Ht 5' 2\" (1.575 m)   Wt 103 kg (227 lb)   SpO2 98%   Breastfeeding No   BMI 41.52 kg/m²      Tmax/24hrs: Temp (24hrs), Av.8 °F (36.6 °C), Min:97.4 °F (36.3 °C), Max:98.2 °F (36.8 °C)  No intake or output data in the 24 hours ending 22 0921    Tele:   General:  Cooperative, Not in acute distress, speaks in full sentence while in bed  HEENT: PERRL, EOMI, supple neck, no JVD, dry oral mucosa  Cardiovascular: S1S2 regular, no rub/gallop   Pulmonary: Clear air entry bilaterally, no wheezing, no crackle  GI:  Soft, non tender, non distended, +bs, no guarding   Extremities:  No pedal edema, +distal pulses appreciated   Right ring finger swelling decrease, redness, swelling at dorsal of right hand has improved. Neuro: AOx3, moving all extremities, no gross deficit.      Consults:   Ortho: dr Reji Salcido  ID: Dr Zhou Gutierrez Diagnostic Studies:   XR Results (most recent):  Results from [facility] Encounter encounter on 03/05/22    XR 4TH FINGER RT MIN 2 V    Narrative  Right fourth digit radiographs    HISTORY: Redness and swelling right hand upon waking, pain and difficulty  bending fingers, extensive redness and swelling fourth digit. COMPARISON: None. FINDINGS:    Three views obtained. There is no fracture or dislocation. The joint spaces  are maintained. Mineralization is normal.   There is edema or inflammation at  the base of the fourth digit. No soft tissue gas. There is no radiopaque  foreign body. Impression  Edema or inflammation at the base of the fourth digit. No soft tissue gas or  bony abnormality. Lab/Data Review:  Labs: Results:       Chemistry Recent Labs     03/09/22 0133 03/08/22 0435 03/07/22  0101   * 177* 315*    135* 135*   K 4.0 3.9 3.5    102 102   CO2 32 29 28   BUN 13 12 8   CREA 0.71 0.64 0.75   CA 9.3 9.6 9.2   AGAP 3 4 5   BUCR 18 19 11*      CBC w/Diff Recent Labs     03/09/22 0133 03/08/22  0435 03/07/22  0101   WBC 8.2 9.7 11.9   RBC 3.95* 4.03* 3.99*   HGB 10.2* 10.7* 10.7*   HCT 32.5* 33.3* 32.9*    328 342      Coagulation No results for input(s): PTP, INR, APTT, INREXT in the last 72 hours. Iron/Ferritin Recent Labs     03/07/22  0101   IRON 23*      BNP No results for input(s): BNPP in the last 72 hours. Cardiac Enzymes No results for input(s): CPK, CKND1, SASCHA in the last 72 hours. No lab exists for component: CKRMB, TROIP   Liver Enzymes No results for input(s): TP, ALB, TBIL, AP in the last 72 hours. No lab exists for component: SGOT, GPT, DBIL   Thyroid Studies No results for input(s): T4, T3U, TSH, TSHEXT in the last 72 hours.     No lab exists for component: T3RU       All Micro Results     Procedure Component Value Units Date/Time    CULTURE, BLOOD [[id number]] Collected: 03/05/22 1120    Order Status: Completed Specimen: Blood Updated: 03/09/22 0648     Special Requests: NO SPECIAL REQUESTS Culture result: NO GROWTH 4 DAYS       CULTURE, BLOOD [[id number]] Collected: 03/05/22 1135    Order Status: Completed Specimen: Blood Updated: 03/09/22 0648     Special Requests: NO SPECIAL REQUESTS        Culture result: NO GROWTH 4 DAYS       CULTURE, ANAEROBIC [[id number]] Collected: 03/06/22 1246    Order Status: Completed Specimen: Hand Updated: 03/08/22 1101     Special Requests: SURG RT HAND     Culture result: NO ANAEROBES ISOLATED       CULTURE, BODY FLUID Lillie Gonzalez STAIN [[id number]]  (Abnormal)  (Susceptibility) Collected: 03/06/22 1246    Order Status: Completed Specimen: Body Fluid from Hand Updated: 03/08/22 1100     Special Requests: SURG RT HAND     GRAM STAIN FEW WBCS SEEN               RARE GRAM POSITIVE COCCI IN PAIRS           Culture result:       MODERATE * METHICILLIN RESISTANT STAPHYLOCOCCUS AUREUS *            (NOTE) ;;MRSA NOTIFIED DR ROTHMAN ON 3/8/22 AT 1059. [facility]    CULTURE, MRSA [[id number]] Collected: 03/05/22 1645    Order Status: Canceled Specimen: Nasal     COVID-19 RAPID TEST [[id number]] Collected: 03/05/22 1415    Order Status: Completed Specimen: Nasopharyngeal Updated: 03/05/22 1541     Specimen source Nasopharyngeal        COVID-19 rapid test Not detected        Comment: Rapid Abbott ID Now       Rapid NAAT:  The specimen is NEGATIVE for SARS-CoV-2, the novel coronavirus associated with COVID-19. Negative results should be treated as presumptive and, if inconsistent with clinical signs and symptoms or necessary for patient management, should be tested with an alternative molecular assay. Negative results do not preclude SARS-CoV-2 infection and should not be used as the sole basis for patient management decisions. This test has been authorized by the FDA under an Emergency Use Authorization (EUA) for use by authorized laboratories.    Fact sheet for Healthcare Providers: ConventionUpdate.co.nz  Fact sheet for Patients: iTendency.uy       Methodology: Isothermal Nucleic Acid Amplification         COVID-19 RAPID TEST [[id number]]     Order Status: Canceled                 Medications at discharge  including reasons for change and indications for new ones:   Current Discharge Medication List      START taking these medications    Details   oxyCODONE IR (ROXICODONE) 5 mg immediate release tablet Take 1-2 Tablets by mouth every four (4) hours as needed for Pain for up to 7 days. Max Daily Amount: 60 mg.  Qty: 60 Tablet, Refills: 0  Start date: 3/9/2022, End date: 3/16/2022    Associated Diagnoses: Cellulitis of finger of right hand      acetaminophen (TYLENOL) 500 mg tablet Take 2 Tablets by mouth every eight (8) hours for 5 days. Indications: pain  Qty: 30 Tablet, Refills: 0  Start date: 3/9/2022, End date: 3/14/2022      linezolid (ZYVOX) 600 mg tablet Take 1 Tablet by mouth two (2) times a day for 12 days. Indications: skin infection due to Staphylococcus aureus bacteria  Qty: 24 Tablet, Refills: 0  Start date: 3/8/2022, End date: 3/20/2022         CONTINUE these medications which have CHANGED    Details   insulin aspart U-100 (NovoLOG Flexpen U-100 Insulin) 100 unit/mL (3 mL) inpn 25 Units by SubCUTAneous route Before breakfast, lunch, dinner and at bedtime. Indications: type 2 diabetes mellitus  Qty: 5 Adjustable Dose Pre-filled Pen Syringe, Refills: 1  Start date: 3/9/2022         CONTINUE these medications which have NOT CHANGED    Details   simvastatin (ZOCOR) 40 mg tablet Take 40 mg by mouth nightly. metFORMIN (GLUCOPHAGE) 500 mg tablet Take 1,000 mg by mouth two (2) times daily (with meals). gabapentin (NEURONTIN) 300 mg capsule Take 900 mg by mouth three (3) times daily. Indications: neuropathic pain      aspirin 81 mg chewable tablet Take 81 mg by mouth daily. lisinopril (PRINIVIL, ZESTRIL) 30 mg tablet Take 30 mg by mouth two (2) times a day.       diltiazem hcl 240 mg Tb24 Take 1 Tab by mouth two (2) times a day. Indications: HYPERTENSION      hydrochlorothiazide (HYDRODIURIL) 25 mg tablet Take 25 mg by mouth daily.     Indications: HYPERTENSION         STOP taking these medications       diclofenac EC (VOLTAREN) 75 mg EC tablet Comments:   Reason for Stopping:                       Pending laboratory work and tests: none    Activity: Activity as tolerated, limited right hand use due to pain  She will need hand therapy at outpatient     Diet: Cardiac Diet, Diabetic Diet and Low fat, Low cholesterol    Wound Care: Keep wound clean and dry      Time spent >30 minutes  Yareli Sosa MD  3/9/2022  9:21 AM

## 2022-03-09 NOTE — PROGRESS NOTES
Bedside and Verbal shift change report given to Major Christy RN (oncoming nurse) by Angie Cooper RN (offgoing nurse). Report included the following information SBAR, Kardex, Intake/Output, MAR and Recent Results.

## 2022-03-10 ENCOUNTER — TELEPHONE (OUTPATIENT)
Dept: ORTHOPEDIC SURGERY | Age: 57
End: 2022-03-10

## 2022-03-10 NOTE — DISCHARGE SUMMARY
Discharge Summary    Patient: Gagandeep Jones               Sex: female          DOA: 3/5/2022         YOB: 1965      Age:  55 y.o.        LOS:  LOS: 4 days                Admit Date: 3/5/2022    Discharge Date: 3/9/2022    Primary care physician: Dilcia Augustine MD    Discharge Diagnoses:    1. Cellulitis of finger of right hand with underlying abscess       +post INCISION AND DRAINAGE RIGHT RING FINGER (3/6/22)        +intraoperative culture grew MRSA  2. Hypertension   3. Hyperglycemia with Type 2 DM, HgbA1c 10.9%  4. Morbid obesity BMI 41   5. Hyperlipidemia   6. Diabetic neuropathy   7. Anemia of blood loss, stable         Discharge Condition: Good  Disposition: home with home health   Code Status: full code     Follow up for Primary Care Physician:  1)  Follow up with Dr Maria R Toscano in 5 days for check up on her wound. She is to keep her wound dressing on, keep it dry. 2)  She continues on oral antibiotic as prescribed by ID to completion   3)  She has been prescribed as needed narcotic for home use   4)  She can go back to work after [employer] of next week with limited use to her right hand     Hospital Course:   55 y.o female with HTN, h/o CVA, obesity,  Type 2 DM, presented to [facility] with right ring finger pain and swelling that has been worsened over the last day. 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 In the ER, xray of hand was without gas. She received IV zosyn while in [facility]. Clinically, her infection was quickly spreading from her finger and there was concern for deep abscess formation in the dorsal aspect of her right ring finger. orthor surgery consulted and performed I&D of right finger on 3/6/22. No evidence of bone involvement, no evidence of PIP joint involvement. Due to her rapid spread, clindamycin was added to her IV antibiotics for toxin producing organisms concern. ID consulted. Her intraoperative culture grew MRSA.  Her symptom improved with hand exercise and IV antibiotics. Her pain was managed. She was seen daily by ortho for wound care. ID recommended Linezolid at discharge. She has underlying type 2 DM that has not been control. Her insulin has been increased to meet her glycemic goal while inpatient. She has anemia due to blood loss but no transfusion needed       Last 24 Hours: she tolerated her wound change this AM. Her pain is controlled. No fever. Case management to help her acquired prior auth for her Linezolid  Ortho recommended to follow up in clinic on Monday for wound check    ROS:  No current fever/chills, no headache, no dizziness, no facial pain, no sinus congestion,   No swallowing pain, No chest pain, no palpitation, no shortness of breath, no abd pain,  No diarrhea, no urinary complaint, no leg pain or swelling, +right hand swelling and pain    VS:   Visit Vitals  /80 (BP 1 Location: Left upper arm, BP Patient Position: At rest)   Pulse 74   Temp 98 °F (36.7 °C)   Resp 18   Ht 5' 2\" (1.575 m)   Wt 103 kg (227 lb)   SpO2 100%   Breastfeeding No   BMI 41.52 kg/m²      Tmax/24hrs: Temp (24hrs), Av.9 °F (36.6 °C), Min:97.7 °F (36.5 °C), Max:98 °F (36.7 °C)  No intake or output data in the 24 hours ending 03/10/22 0711    Tele:   General:  Cooperative, Not in acute distress, speaks in full sentence while in bed  HEENT: PERRL, EOMI, supple neck, no JVD, dry oral mucosa  Cardiovascular: S1S2 regular, no rub/gallop   Pulmonary: Clear air entry bilaterally, no wheezing, no crackle  GI:  Soft, non tender, non distended, +bs, no guarding   Extremities:  No pedal edema, +distal pulses appreciated   Right ring finger swelling decrease, redness, swelling at dorsal of right hand has improved. Neuro: AOx3, moving all extremities, no gross deficit.      Consults:   Ortho: dr Avtar Dunn  ID: Dr Porter Cargo Studies:   XR Results (most recent):  Results from [facility] Encounter encounter on 22    XR 4TH FINGER RT MIN 2 V    Narrative  Right fourth digit radiographs    HISTORY: Redness and swelling right hand upon waking, pain and difficulty  bending fingers, extensive redness and swelling fourth digit. COMPARISON: None. FINDINGS:    Three views obtained. There is no fracture or dislocation. The joint spaces  are maintained. Mineralization is normal.   There is edema or inflammation at  the base of the fourth digit. No soft tissue gas. There is no radiopaque  foreign body. Impression  Edema or inflammation at the base of the fourth digit. No soft tissue gas or  bony abnormality. Lab/Data Review:  Labs: Results:       Chemistry Recent Labs     03/09/22  0133 03/08/22  0435   * 177*    135*   K 4.0 3.9    102   CO2 32 29   BUN 13 12   CREA 0.71 0.64   CA 9.3 9.6   AGAP 3 4   BUCR 18 19      CBC w/Diff Recent Labs     03/09/22 0133 03/08/22  0435   WBC 8.2 9.7   RBC 3.95* 4.03*   HGB 10.2* 10.7*   HCT 32.5* 33.3*    328      Coagulation No results for input(s): PTP, INR, APTT, INREXT, INREXT in the last 72 hours. Iron/Ferritin No results for input(s): IRON in the last 72 hours. No lab exists for component: TIBCCALC   BNP No results for input(s): BNPP in the last 72 hours. Cardiac Enzymes No results for input(s): CPK, CKND1, SACSHA in the last 72 hours. No lab exists for component: CKRMB, TROIP   Liver Enzymes No results for input(s): TP, ALB, TBIL, AP in the last 72 hours. No lab exists for component: SGOT, GPT, DBIL   Thyroid Studies No results for input(s): T4, T3U, TSH, TSHEXT, TSHEXT in the last 72 hours.     No lab exists for component: T3RU       All Micro Results     Procedure Component Value Units Date/Time    CULTURE, BLOOD [[id number]] Collected: 03/05/22 1120    Order Status: Completed Specimen: Blood Updated: 03/09/22 0648     Special Requests: NO SPECIAL REQUESTS        Culture result: NO GROWTH 4 DAYS       CULTURE, BLOOD [[id number]] Collected: 03/05/22 1135    Order Status: Completed Specimen: Blood Updated: 03/09/22 0648     Special Requests: NO SPECIAL REQUESTS        Culture result: NO GROWTH 4 DAYS       CULTURE, ANAEROBIC [[id number]] Collected: 03/06/22 1246    Order Status: Completed Specimen: Hand Updated: 03/08/22 1101     Special Requests: SURG RT HAND     Culture result: NO ANAEROBES ISOLATED       CULTURE, BODY FLUID [place] STAIN [[id number]]  (Abnormal)  (Susceptibility) Collected: 03/06/22 1246    Order Status: Completed Specimen: Body Fluid from Hand Updated: 03/08/22 1100     Special Requests: SURG RT HAND     GRAM STAIN FEW WBCS SEEN               RARE GRAM POSITIVE COCCI IN PAIRS           Culture result:       MODERATE * METHICILLIN RESISTANT STAPHYLOCOCCUS AUREUS *            (NOTE) ;;MRSA NOTIFIED DR ROTHMAN ON 3/8/22 AT 1059. [facility]    CULTURE, MRSA [[id number]] Collected: 03/05/22 1645    Order Status: Canceled Specimen: Nasal     COVID-19 RAPID TEST [[id number]] Collected: 03/05/22 1415    Order Status: Completed Specimen: Nasopharyngeal Updated: 03/05/22 1541     Specimen source Nasopharyngeal        COVID-19 rapid test Not detected        Comment: Rapid Abbott ID Now       Rapid NAAT:  The specimen is NEGATIVE for SARS-CoV-2, the novel coronavirus associated with COVID-19. Negative results should be treated as presumptive and, if inconsistent with clinical signs and symptoms or necessary for patient management, should be tested with an alternative molecular assay. Negative results do not preclude SARS-CoV-2 infection and should not be used as the sole basis for patient management decisions. This test has been authorized by the FDA under an Emergency Use Authorization (EUA) for use by authorized laboratories.    Fact sheet for Healthcare Providers: ConventionUpdate.co.nz  Fact sheet for Patients: ConventionUpdate.co.nz       Methodology: Isothermal Nucleic Acid Amplification         COVID-19 RAPID TEST [[id number]]     Order Status: Canceled                 Medications at discharge  including reasons for change and indications for new ones:   Discharge Medication List as of 3/9/2022 12:35 PM      START taking these medications    Details   oxyCODONE IR (ROXICODONE) 5 mg immediate release tablet Take 1-2 Tablets by mouth every four (4) hours as needed for Pain for up to 7 days. Max Daily Amount: 60 mg., Normal, Disp-60 Tablet, R-0      acetaminophen (TYLENOL) 500 mg tablet Take 2 Tablets by mouth every eight (8) hours for 5 days. Indications: pain, Normal, Disp-30 Tablet, R-0      linezolid (ZYVOX) 600 mg tablet Take 1 Tablet by mouth two (2) times a day for 12 days. Indications: skin infection due to Staphylococcus aureus bacteria, Print, Disp-24 Tablet, R-0         CONTINUE these medications which have CHANGED    Details   insulin aspart U-100 (NovoLOG Flexpen U-100 Insulin) 100 unit/mL (3 mL) inpn 25 Units by SubCUTAneous route Before breakfast, lunch, dinner and at bedtime. Indications: type 2 diabetes mellitus, Normal, Disp-5 Adjustable Dose Pre-filled Pen Syringe, R-1         CONTINUE these medications which have NOT CHANGED    Details   simvastatin (ZOCOR) 40 mg tablet Take 40 mg by mouth nightly., Historical Med      metFORMIN (GLUCOPHAGE) 500 mg tablet Take 1,000 mg by mouth two (2) times daily (with meals). , Historical Med      gabapentin (NEURONTIN) 300 mg capsule Take 900 mg by mouth three (3) times daily. Indications: neuropathic pain, Historical Med      aspirin 81 mg chewable tablet Take 81 mg by mouth daily. , Historical Med      lisinopril (PRINIVIL, ZESTRIL) 30 mg tablet Take 30 mg by mouth two (2) times a day., Historical Med      diltiazem hcl 240 mg Tb24 Take 1 Tab by mouth two (2) times a day. Indications: HYPERTENSION, Historical Med      hydrochlorothiazide (HYDRODIURIL) 25 mg tablet Take 25 mg by mouth daily.     Indications: HYPERTENSION, Historical Med         STOP taking these medications       diclofenac EC (VOLTAREN) 75 mg EC tablet Comments:   Reason for Stopping:                       Pending laboratory work and tests: none    Activity: Activity as tolerated, limited right hand use due to pain  She will need hand therapy at outpatient     Diet: Cardiac Diet, Diabetic Diet and Low fat, Low cholesterol    Wound Care: Keep wound clean and dry      Time spent >30 minutes  Arianne Stovall MD  3/9/2022  9:21 AM

## 2022-03-10 NOTE — DISCHARGE INSTRUCTIONS
DISCHARGE SUMMARY from Nurse    PATIENT INSTRUCTIONS:    After general anesthesia or intravenous sedation, for 24 hours or while taking prescription Narcotics:  · Limit your activities  · Do not drive and operate hazardous machinery  · Do not make important personal or business decisions  · Do  not drink alcoholic beverages  · If you have not urinated within 8 hours after discharge, please contact your surgeon on call. Report the following to your surgeon:  · Excessive pain, swelling, redness or odor of or around the surgical area  · Temperature over 100.5  · Nausea and vomiting lasting longer than 4 hours or if unable to take medications  · Any signs of decreased circulation or nerve impairment to extremity: change in color, persistent  numbness, tingling, coldness or increase pain  · Any questions    What to do at Home:  Recommended activity: Activity as tolerated,     If you experience any of the following symptoms chest pain, shortness of breath, fever greater than 100.5, swelling, discharge of odor, pain unrelieved by medication, please follow up with Dr. Charan Killian. *  Please give a list of your current medications to your Primary Care Provider. *  Please update this list whenever your medications are discontinued, doses are      changed, or new medications (including over-the-counter products) are added. *  Please carry medication information at all times in case of emergency situations. These are general instructions for a healthy lifestyle:    No smoking/ No tobacco products/ Avoid exposure to second hand smoke  Surgeon General's Warning:  Quitting smoking now greatly reduces serious risk to your health.     Obesity, smoking, and sedentary lifestyle greatly increases your risk for illness    A healthy diet, regular physical exercise & weight monitoring are important for maintaining a healthy lifestyle    You may be retaining fluid if you have a history of heart failure or if you experience any of the following symptoms:  Weight gain of 3 pounds or more overnight or 5 pounds in a week, increased swelling in our hands or feet or shortness of breath while lying flat in bed. Please call your doctor as soon as you notice any of these symptoms; do not wait until your next office visit. Patient armband removed and shredded  MyChart Activation    Thank you for requesting access to [facility]. Please follow the instructions below to securely access and download your online medical record. [facility] allows you to send messages to your doctor, view your test results, renew your prescriptions, schedule appointments, and more. How Do I Sign Up? 1. In your internet browser, go to [email or web address]  2. Click on the First Time User? Click Here link in the Sign In box. You will be redirect to the New Member Sign Up page. 3. Enter your [facility] Access Code exactly as it appears below. You will not need to use this code after youve completed the sign-up process. If you do not sign up before the expiration date, you must request a new code. [facility] Access Code: [email or web address]  Expires: 3/25/2022 11:43 AM (This is the date your [facility] access code will )    4. Enter the last four digits of your Social Security Number (xxxx) and Date of Birth (mm/dd/yyyy) as indicated and click Submit. You will be taken to the next sign-up page. 5. Create a [facility] ID. This will be your [facility] login ID and cannot be changed, so think of one that is secure and easy to remember. 6. Create a [facility] password. You can change your password at any time. 7. Enter your Password Reset Question and Answer. This can be used at a later time if you forget your password. 8. Enter your e-mail address. You will receive e-mail notification when new information is available in [address] 9. Click Sign Up. You can now view and download portions of your medical record.   10. Click the Download Summary menu link to download a portable copy of your medical information. Additional Information    If you have questions, please visit the Frequently Asked Questions section of the [employer] website at [email or web address] Remember, [facility] is NOT to be used for urgent needs. For medical emergencies, dial 911. The discharge information has been reviewed with the {PATIENT PARENT GUARDIAN:79908}. The {PATIENT PARENT GUARDIAN:59854} verbalized understanding. Discharge medications reviewed with the {[employer] meds reviewed RWCP:51563} and appropriate educational materials and side effects teaching were provided. ___________________________________________________________________________________________________________________________________    Discharge Instructions    Patient: Ruma Ashley MRN: [id number]  CSN: [id number]    YOB: 1965  Age: 55 y.o. Sex: female    DOA: 3/5/2022 LOS:  LOS: 4 days   Discharge Date:      ACUTE DIAGNOSES:  1. Cellulitis of finger of right hand with underlying abscess       +post INCISION AND DRAINAGE RIGHT RING FINGER (3/6/22)        +intraoperative culture grew MRSA  2. Hypertension   3. Hyperglycemia with Type 2 DM, HgbA1c 10.9%  4. Morbid obesity BMI 41   5. Hyperlipidemia   6. Diabetic neuropathy   7. Anemia of blood loss, stable         DISCHARGE MEDICATIONS:     NOTE: you are continue on Linezolid for your antibiotic per Dr. Apollo Lee recommendation  NOTE: your insulin has been increased to help with better glycemic control         · It is important that you take the medication exactly as they are prescribed. · Keep your medication in the bottles provided by the pharmacist and keep a list of the medication names, dosages, and times to be taken in your wallet. · Do not take other medications without consulting your doctor.        DIET:  Cardiac Diet, Diabetic Diet and Low fat, Low cholesterol    ACTIVITY: Activity as tolerated  Keep your dressing on until seen in ortho office on Monday or Tuesday  Dr. Colleen Forte  Postoperative Information      You will be given a prescription for pain medication. It may be taken every 4-6 hours as needed for pain for the first 4-5 days. A bandage was placed on your incision after surgery. You need to keep this incision clean and dry. The operated area may be sore and develop bruising over the next several days. You should expect swelling in the area. You may elevate the operated extremity and apply an icepack on top of the dressing to help minimize the swelling. It is safe to take a shower two days after surgery but you must keep the operated area dry. If you have a high temperature, unexpected pain, redness or swelling, or any drainage around your operated area, please call my office immediately. Please make an appointment to return to my office Monday or Tuesday. Dr. Adrian Gama office number [phone]        ADDITIONAL INFORMATION: If you experience any of the following symptoms then please call your primary care physician or return to the emergency room if you cannot get hold of your doctor: Fever, chills, nausea, vomiting, diarrhea, change in mentation, falling, bleeding, shortness of breath. FOLLOW UP CARE:  Dr. Benito Charlton MD  you are to call and set up an appointment to see them in 2 weeks. Follow-up with *Dr. Caridad Murdock, orthopaedic on Monday or Tuesday  Dr. Adrian Gama office number [phone]      Information obtained by :  I understand that if any problems occur once I am at home I am to contact my physician. I understand and acknowledge receipt of the instructions indicated above.                                                                                                                                            Physician's or R.N.'s Signature                                                                  Date/Time Patient or Representative Signature                                                          Date/Time    Brian Santiago MD  3/9/2022  9:17 AM

## 2022-03-10 NOTE — TELEPHONE ENCOUNTER
Please call patient. She needs post op apt on Monday or Tuesday. She somehow was given my cell number. Please give her office number.

## 2022-03-11 LAB
BACTERIA SPEC CULT: NORMAL
BACTERIA SPEC CULT: NORMAL
SERVICE CMNT-IMP: NORMAL
SERVICE CMNT-IMP: NORMAL

## 2022-03-13 RX ORDER — FENTANYL CITRATE 50 UG/ML
INJECTION, SOLUTION INTRAMUSCULAR; INTRAVENOUS AS NEEDED
Status: DISCONTINUED | OUTPATIENT
Start: 2022-03-06 | End: 2022-03-13 | Stop reason: HOSPADM

## 2022-03-13 NOTE — ADDENDUM NOTE
Addendum  created 03/13/22 0730 by Charity Soler CRNA    Intraprocedure Meds edited, Orders acknowledged in Narrator

## 2022-03-15 ENCOUNTER — HOSPITAL ENCOUNTER (OUTPATIENT)
Dept: PHYSICAL THERAPY | Age: 57
Discharge: HOME | End: 2022-03-15
Payer: COMMERCIAL

## 2022-03-15 ENCOUNTER — OFFICE VISIT (OUTPATIENT)
Dept: ORTHOPEDIC SURGERY | Age: 57
End: 2022-03-15
Payer: COMMERCIAL

## 2022-03-15 VITALS — HEART RATE: 76 BPM | TEMPERATURE: 98 F | OXYGEN SATURATION: 100 %

## 2022-03-15 DIAGNOSIS — L03.011 CELLULITIS OF FINGER OF RIGHT HAND: Primary | ICD-10-CM

## 2022-03-15 PROCEDURE — 97165 OT EVAL LOW COMPLEX 30 MIN: CPT

## 2022-03-15 PROCEDURE — 97110 THERAPEUTIC EXERCISES: CPT

## 2022-03-15 PROCEDURE — 99024 POSTOP FOLLOW-UP VISIT: CPT | Performed by: PHYSICIAN ASSISTANT

## 2022-03-15 PROCEDURE — 97535 SELF CARE MNGMENT TRAINING: CPT

## 2022-03-15 NOTE — PROGRESS NOTES
Naren Feliciano  1965     HISTORY OF PRESENT ILLNESS  Naren Feliciano is a 55 y.o. female who presents today for evaluation s/p Incision and drainage of the right ring finger on 3/6/2022. She has been taking her antibiotics. Patient denies any fever, chills, chest pain, shortness of breath or calf pain. The remainder of the review of systems is negative. There are no new illness or injuries to report since last seen in the office. There are no changes to medications, allergies, family or social history. PHYSICAL EXAM:   Visit Vitals  Pulse 76   Temp 98 °F (36.7 °C) (Temporal)   SpO2 100%      The patient is a well-developed, well-nourished female in no acute distress. The patient is alert and oriented times three. The patient appears to be well groomed. Mood and affect are normal.  ORTHOPEDIC EXAM of right ring finger: Inspection: mild swelling  Incision, healing with sutures in place. No active drainage today  Range of motion: unable to make a fist today  ttp none  Stability: Stable  Strength: n/a/5    IMPRESSION:      ICD-10-CM ICD-9-CM    1. Cellulitis of finger of right hand  L03.011 681.00 REFERRAL TO OCCUPATIONAL THERAPY        PLAN: Surgery discussed at length today and incisions were cleaned. No need for packing anymore. She has improved post-operatively and is overall doing well. Start with OT to help with finger mobility. Provided work note to stay out of work x one week.  Return in one week      Scribed by Edouard iDaz [address]) as dictated by RENALDO Rosa [address] and Spine Specialist

## 2022-03-15 NOTE — PROGRESS NOTES
Hand Therapy Evaluation and Daily Note    Patient Name: Naren Feliciano  Date:3/15/2022  : 1965  Age: 55 y.o.y/o  [x]  Patient  Verified  Payor: BLUE CROSS / Plan: [facility] PPO / Product Type: PPO /    Referring Provider: GREGORY Kirkpatrick MD Visit:  3/22/2022  Onset Date:  3/4/2022  Surgical Date: 3/6/2022  Surgical Procedure: s/p Incision and drainage of the right ring finger    In time:10:45 AM  Out time:11:30 AM  Total Treatment Time (min): 45  Total Timed Codes (min): 35  1:1 Treatment Time (MC only): 39   Visit #: 1 of 8    Treatment Area: Cellulitis of right ring finger [T91.803]    Precautions:contact precautions secondary to MRSA     Hand Dominance: right handed   Hand Involved: right    Total Evaluation Time:  10    History of Present Condition:  Patient is a right hand dominant  55 y.o. female with a chief complaint  of right hand pain at the ring finger s/p I&D on 3/6/2022. Pt reports cellulitis of the ring finger, unsure of initial incident. Pain Rating:   Current: (0-no pain 10-debilitating pain) mild   At best: (0-no pain 10-debilitating pain) mild  At worst: (0-no pain 10-debilitating pain) mild  Location: right hand  Type:  mild   Better with: medication  Worse with:     Medications/Allergies/Past Medical History:  See chart; reviewed with patient. Diabetes, arthritis, h/o stroke, HTN, MRSA    Diagnostic Tests: none    Prior Level of Function: (I) with ADL/IADL tasks without functional limitations and pain using right hand.      Current Level of Function:  Min A    Social History: Pt lives in home with spouse and daughter    Occupation/Job Requirements: CNA    Observation: band aids applied   Scar/incision:   1.5 cm open wound, clear drainage  Location:  Right dorsum ring finger PIP jt     Palpation:  na    Range of Motion:   Single Digit ROM CHART as measured in degrees  Digit  A/P 3/15/2022  Right ring finger Date  Side    MP 0-55     PIP 24-52     DIP 0-7 RAND 90       Strength:  NT      Sensation:    intact      Edema: Right ring finger cm circum - P1 8.0, PIP jt NT, P2 5.8 cm circum    GIRTH CHART measured in cm  Date: 3/15/2022       Side Right/left    DPC circum. 19.7/20.1    Wrist Crease     FA      Elbow         Special Tests:   ADLs  Feeding:        []MaxA   []ModA   []Ghanshyam   [] CGA   []SBA   []Daniel   [x]Independent  UE Dressing:       []MaxA   []ModA   []Ghanshyam   [] CGA   []SBA   []Daniel   [x]Independent  LE Dressing:       []MaxA   []ModA   []Ghanshyam   [] CGA   []SBA   []Daniel   [x]Independent  Grooming:       []MaxA   []ModA   [x]Ghanshyam   [] CGA   []SBA   []Daniel   []Independent  Toileting:       []MaxA   []ModA   [x]Ghanshyam   [] CGA   []SBA   []Daniel   []Independent  Bathing:       []MaxA   []ModA   [x]Ghanshyam   [] CGA   []SBA   []Daniel   []Independent  Light Meal Prep:    []MaxA   []ModA   [x]Ghanshyam   [] CGA   []SBA   []Daniel   []Independent  Household/Other: []MaxA   []ModA   [x]Ghanshyam   [] CGA   []SBA   []Daniel   []Independent  Adaptive Equip:     []MaxA   []ModA   []Ghanshyam   [] CGA   []SBA   []Daniel   []Independent  Driving:       []MaxA   []ModA   []Ghanshyam   [] CGA   []SBA   []Daniel   [x]Independent      Todays Treatment:  Patient received an initial evaluation today followed by education as to diagnosis, precautions and treatment plan. Patient was provided with a basic home exercise program including digit blocking and tendon glides. Pt was redressed with xeroform and band aids prior to end of treatment session. She was educated to keep the hand clean and to avoid submerging hand in water to allow for healing of the wound. OBJECTIVE  12 min Therapeutic Exercise:  [x] See flow sheet :   Rationale: increase ROM to improve the patients ability to make a composite fist with right hand.      23 min Self Care/Home Management: wound care, prognosis, diagnosis, precautions, treatment plan, activity modifications, edema mgmt   Rationale: education  to improve the patients ability to promote healing of injury site. With   [] TE   [] TA   [] neuro   [] other: Patient Education: [x] Review HEP    [] Progressed/Changed HEP based on:   [] positioning   [] body mechanics   [] transfers   [] heat/ice application   [] Splint wear/care   [] Sensory re-education   [] scar management      [] other:      Pain Level (0-10 scale) post treatment: 4/10    Patient will continue to benefit from skilled OT services to modify and progress therapeutic interventions, address ROM deficits, address strength deficits, analyze and address soft tissue restrictions and instruct in home and community integration to attain goals. Assessment: Pt presents to skilled OT today rating her pain level 4/10 in the right ring finger. There are bandaids applied with xeroform over her wound. Upon removal, there is a 1.5 cm incision with 2 sutures intact and wound opening on the dorsum PIP jt. The wound is healing with clear drainage noted. She was just seen by referring provider today prior to this evaluation, and provider was packing wound previously. The digit is limited to 90 deg RAND with a 24 deg extension lag noted. There is moderate edema noted about the RF. She reports min A with her ADL/IADL tasks due to the functional limitations and pain using her dominant right hand. 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 Evaluation Complexity: History LOW Complexity : Brief history review  Examination LOW Complexity : 1-3 performance deficits relating to physical, cognitive , or psychosocial skils that result in activity limitations and / or participation restrictions  Clinical Decision Making MEDIUM Complexity : Patient may present with comorbidities that affect occupational performnce.  Miniml to moderate modification of tasks or assistance (eg, physical or verbal ) with assesment(s) is necessary to enable patient to complete evaluation   Overall Complexity Rating: LOW     Patient would benefit from OT/Hand therapy services for the following problems:  Problem List: Pain effecting function, Decreased range of motion, Decreased strength, Edema effecting function, Decreased coordination/prehension, Decreased ADL/functional abilities , Decreased activity tolerance and Decreased flexibility/joint mobility     Treatment Plan may include any combination of the following: Therapeutic exercise, Therapeutic activities, Neuromuscular re-education, Physical agent/modality, Scar management, Manual therapy, Wound care, Patient education and ADLs/IADLs    Patient / Family readiness to learn indicated by: asking questions, trying to perform skills and interest    Persons(s) to be included in education:   patient (P)    Barriers to Learning/Limitations: None    Patient Goal (s): to get my right hand better    Patient Self Reported Health Status: fair    Rehabilitation Potential: good    Short Term Goals: To be accomplished in 2  weeks:  Goal:* Patient will be compliant with initial home exercise program to take an active role in their rehabilitation process. Status at Eval: Patient was provided with a basic home exercise program including digit blocking and tendon glides. Goal:* Patient will demonstrate a good understanding of their condition and strategies for self-management. Status at Eval: pt educated on wound care, prognosis, diagnosis, precautions, treatment plan, activity modifications, edema mgmt    Long Term Goals: To be accomplished in 4 weeks:    Goal:* Patient will regain 220 degrees total arc of motion of the right ring finger to enable grasp of cylindrical objects such as a glass, handle or toothbrush. Status at Eval: 90 deg RAND right RF    Goal:* Patient will show a 30 point improvement on FOTO functional status measure to improve overall functional performance.   Status at Eval: 18    Goal:* Pt will demonstrate 0.3 cm decrease in right RF prox phalanx edema in order to make a composite fist with right hand.    Status at eval: Right ring finger cm circum - P1 8.0, PIP jt NT, P2 5.8      Frequency / Duration: Patient to be seen 2 times per week for 4 weeks:    Patient/ Caregiver education and instruction: Diagnosis, prognosis, self care, activity modification and exercises    Kelvin Chavez OT, 3/15/2022 10:47 AM

## 2022-03-15 NOTE — PROGRESS NOTES
[facility]  [address] [facility] [address]  [place], [address]  [phone] fax    Plan of Care/Statement of Necessity for Occupational Therapy Services    Patient name: Davi Matos Start of Care: 3/15/2022   Referral source: Robby [place], [place] : 1965    Medical Diagnosis: Cellulitis of right ring finger [L03.011]  Payor: [facility] / Plan: [facility] PPO / Product Type: PPO /  Onset Date:3/4/2022    Treatment Diagnosis: right hand pain   Prior Hospitalization: see medical history Provider#: [id number]   Medications: Verified on Patient summary List    Comorbidities: Diabetes, arthritis, h/o stroke, HTN, MRSA   Prior Level of Function: (I) with ADL/IADL tasks without functional limitations and pain using right hand. The Plan of Care and following information is based on the information from the initial evaluation. Assessment/ key information: Patient is a right hand dominant  55 y.o. female with a chief complaint  of right hand pain at the ring finger s/p I&D on 3/6/2022. Pt reports cellulitis of the ring finger, unsure of initial incident. Pt presents to skilled OT today rating her pain level 4/10 in the right ring finger. There are bandaids applied with xeroform over her wound. Upon removal, there is a 1.5 cm incision with 2 sutures intact and wound opening on the dorsum PIP jt. The wound is healing with clear drainage noted. She was just seen by referring provider today prior to this evaluation, and provider was packing wound previously. The digit is limited to 90 deg RAND with a 24 deg extension lag noted. There is moderate edema noted about the RF. She reports min A with her ADL/IADL tasks due to the functional limitations and pain using her dominant right hand. 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  Patient received an initial evaluation today followed by education as to diagnosis, precautions and treatment plan. Patient was provided with a basic home exercise program including digit blocking and tendon glides. Pt was redressed with xeroform and band aids prior to end of treatment session. She was educated to keep the hand clean and to avoid submerging hand in water to allow for healing of the wound. Skilled OT services are necessary to address aforementioned deficits to improve quality of life. Evaluation Complexity: History LOW Complexity : Brief history review  Examination LOW Complexity : 1-3 performance deficits relating to physical, cognitive , or psychosocial skils that result in activity limitations and / or participation restrictions  Clinical Decision Making MEDIUM Complexity : Patient may present with comorbidities that affect occupational performnce. Miniml to moderate modification of tasks or assistance (eg, physical or verbal ) with assesment(s) is necessary to enable patient to complete evaluation   Overall Complexity Rating: LOW     Patient would benefit from OT/Hand therapy services for the following problems:  Problem List: Pain effecting function, Decreased range of motion, Decreased strength, Edema effecting function, Decreased coordination/prehension, Decreased ADL/functional abilities , Decreased activity tolerance and Decreased flexibility/joint mobility     Treatment Plan may include any combination of the following: Therapeutic exercise, Therapeutic activities, Neuromuscular re-education, Physical agent/modality, Scar management, Manual therapy, Wound care, Patient education and ADLs/IADLs    Patient / Family readiness to learn indicated by: asking questions, trying to perform skills and interest    Persons(s) to be included in education:   patient (P)    Barriers to Learning/Limitations: None    Patient Goal (s): to get my right hand better    Patient Self Reported Health Status: fair    Rehabilitation Potential: good    Short Term Goals:  To be accomplished in 2  weeks:  Goal:* Patient will be compliant with initial home exercise program to take an active role in their rehabilitation process. Status at Eval: Patient was provided with a basic home exercise program including digit blocking and tendon glides. Goal:* Patient will demonstrate a good understanding of their condition and strategies for self-management. Status at Eval: pt educated on wound care, prognosis, diagnosis, precautions, treatment plan, activity modifications, edema mgmt    Long Term Goals: To be accomplished in 4 weeks:    Goal:* Patient will regain 220 degrees total arc of motion of the right ring finger to enable grasp of cylindrical objects such as a glass, handle or toothbrush. Status at Eval: 90 deg RAND right RF    Goal:* Patient will show a 30 point improvement on FOTO functional status measure to improve overall functional performance. Status at Eval: 18    Goal:* Pt will demonstrate 0.3 cm decrease in right RF prox phalanx edema in order to make a composite fist with right hand. Status at eval: Right ring finger cm circum - P1 8.0, PIP jt NT, P2 5.8      Frequency / Duration: Patient to be seen 2 times per week for 4 weeks:  Patient/ Caregiver education and instruction: Diagnosis, prognosis, self care, activity modification and exercises  [x]  Plan of care has been reviewed with Hue Araujo OT 3/15/2022 11:56 AM  ________________________________________________________________________    I certify that the above Therapy Services are being furnished while the patient is under my care. I agree with the treatment plan and certify that this therapy is necessary.     [de-identified] Signature:____________Date:_________TIME:________     GREGORY Kamara  ** Signature, Date and Time must be completed for valid certification **    Please sign and return to [facility] [address] [place] [address] & [facility]  [address]  [place], [address]  [phone] fax

## 2022-03-15 NOTE — LETTER
NOTIFICATION RETURN TO WORK / SCHOOL    3/15/2022 8:56 AM    [phone]      To Whom It May Concern:    Nakia Nelson is currently under the care of [address] [place] [address] Reid Ayala. She was evaluated in the office today and is to remain on no duty at work for one week until reevaluated in the office. If there are questions or concerns please have the patient contact our office.         Sincerely,      GREGORY Vila

## 2022-03-16 ENCOUNTER — TELEPHONE (OUTPATIENT)
Dept: PHYSICAL THERAPY | Age: 57
End: 2022-03-16

## 2022-03-16 NOTE — TELEPHONE ENCOUNTER
Patient called to say she needs to be placed on hold, patient found out she will not be getting paid from work until she actually ges cleared to go back. She cannot afford $60 copay until she is getting paid again.  She stated she should be ok to come in for appt on April 4th

## 2022-03-18 PROBLEM — L03.011 CELLULITIS OF FINGER OF RIGHT HAND: Status: ACTIVE | Noted: 2022-03-05

## 2022-03-19 PROBLEM — I10 HTN (HYPERTENSION): Status: ACTIVE | Noted: 2022-03-05

## 2022-03-19 PROBLEM — E11.9 DM (DIABETES MELLITUS) (HCC): Status: ACTIVE | Noted: 2022-03-05

## 2022-03-21 ENCOUNTER — APPOINTMENT (OUTPATIENT)
Dept: PHYSICAL THERAPY | Age: 57
End: 2022-03-21
Payer: COMMERCIAL

## 2022-03-22 ENCOUNTER — OFFICE VISIT (OUTPATIENT)
Dept: ORTHOPEDIC SURGERY | Age: 57
End: 2022-03-22
Payer: COMMERCIAL

## 2022-03-22 VITALS — TEMPERATURE: 97.7 F

## 2022-03-22 DIAGNOSIS — L03.011 CELLULITIS OF FINGER OF RIGHT HAND: Primary | ICD-10-CM

## 2022-03-22 PROCEDURE — 99024 POSTOP FOLLOW-UP VISIT: CPT | Performed by: PHYSICIAN ASSISTANT

## 2022-03-22 NOTE — LETTER
NOTIFICATION RETURN TO WORK / SCHOOL    3/22/2022 11:45 AM    [phone]      To Whom It May Concern:    Davi Matos is currently under the care of [address] [place], [place] [address]trista Ayala. She will return to work/school on 3/23/22. Full duty status. If there are questions or concerns please have the patient contact our office.         Sincerely,      GREGORY Benitez

## 2022-03-22 NOTE — PROGRESS NOTES
Checo Zamarripa  1965     HISTORY OF PRESENT ILLNESS  Checo Zamarripa is a 55 y.o. female who presents today for evaluation s/p Incision and drainage of the right ring finger on 3/6/2022. She has been taking her antibiotics. Patient denies any fever, chills, chest pain, shortness of breath or calf pain. The remainder of the review of systems is negative. There are no new illness or injuries to report since last seen in the office. There are no changes to medications, allergies, family or social history. PHYSICAL EXAM:   Visit Vitals  Temp 97.7 °F (36.5 °C) (Temporal)      The patient is a well-developed, well-nourished female in no acute distress. The patient is alert and oriented times three. The patient appears to be well groomed. Mood and affect are normal.  ORTHOPEDIC EXAM of right ring finger: Inspection: mild swelling  Incision, healing with sutures in place. No active drainage today  Range of motion: unable to make a fist today  ttp none  Stability: Stable  Strength: n/a/5    IMPRESSION:      ICD-10-CM ICD-9-CM    1. Cellulitis of finger of right hand  L03.011 681.00         PLAN:  1. Patient improving postoperatively. We will continue working on her range of motion. Sutures removed bandage applied today. She is okay to return to work tomorrow. If her range of motion plateaus we will reevaluate her. Patient knows worrisome signs to look for. Discussed that because of her diabetes her healing will take longer.      RENALDO Stout [phone]

## 2022-03-23 ENCOUNTER — APPOINTMENT (OUTPATIENT)
Dept: PHYSICAL THERAPY | Age: 57
End: 2022-03-23
Payer: COMMERCIAL

## 2022-03-28 ENCOUNTER — APPOINTMENT (OUTPATIENT)
Dept: PHYSICAL THERAPY | Age: 57
End: 2022-03-28
Payer: COMMERCIAL

## 2022-03-30 ENCOUNTER — APPOINTMENT (OUTPATIENT)
Dept: PHYSICAL THERAPY | Age: 57
End: 2022-03-30
Payer: COMMERCIAL

## 2022-04-04 ENCOUNTER — TELEPHONE (OUTPATIENT)
Dept: PHYSICAL THERAPY | Age: 57
End: 2022-04-04

## 2022-04-04 NOTE — PROGRESS NOTES
[facility]   [address] [place] [address]  [place], [address]  [phone] fax    Occupational Therapy Discharge Summary    Patient name: Nicole Laws Start of Care: 3/15/2022   Referral source: [employer] [place] : 1965   Medical/Treatment Diagnosis: Cellulitis of right ring finger [L03.011]  Payor: [employer] / Plan: [facility] PPO / Product Type: PPO /  Onset Date:3/4/2022     Prior Hospitalization: see medical history Provider#: [id number]   Medications: Verified on Patient Summary List     Comorbidities: Diabetes, arthritis, h/o stroke, HTN, MRSA   Prior Level of Function: (I) with ADL/IADL tasks without functional limitations and pain using right hand.        Visits from Start of Care: 1    Missed Visits: 2  Reporting Period : 3/15/2022 to 3/15/2022    Summary of Care:  Short Term Goals: To be accomplished in 2  weeks:  Goal:* Patient will be compliant with initial home exercise program to take an active role in their rehabilitation process. Status at Eval: Patient was provided with a basic home exercise program including digit blocking and tendon glides.      Goal:* Patient will demonstrate a good understanding of their condition and strategies for self-management. Status at Eval: pt educated on wound care, prognosis, diagnosis, precautions, treatment plan, activity modifications, edema mgmt     Long Term Goals: To be accomplished in 4 weeks:                       Goal:* Patient will regain 220 degrees total arc of motion of the right ring finger to enable grasp of cylindrical objects such as a glass, handle or toothbrush. Status at Eval: 90 deg RAND right RF     Goal:* Patient will show a 30 point improvement on FOTO functional status measure to improve overall functional performance. Status at Eval: 18     Goal:* Pt will demonstrate 0.3 cm decrease in right RF prox phalanx edema in order to make a composite fist with right hand. Status at eval: Right ring finger cm circum - P1 8.0, PIP jt NT, P2 5.8    ASSESSMENT/RECOMMENDATIONS: Unable to further assess goals due to pt requested d/c due to financial reasons. Pt was seen for initial evaluation only and was educated on infection prevention and was provided digit AROM HEP. Will d/c at this time at patient's request and will assess in the future if medically necessary.  Thank you for this referral.     [x]Discontinue therapy: []Patient has reached or is progressing toward set goals      [x]Patient is non-compliant or has abdicated      []Due to lack of appreciable progress towards set [facility] 4/4/2022 10:34 AM

## 2022-04-06 ENCOUNTER — APPOINTMENT (OUTPATIENT)
Dept: PHYSICAL THERAPY | Age: 57
End: 2022-04-06

## 2022-04-06 DIAGNOSIS — L03.011 CELLULITIS OF FINGER OF RIGHT HAND: Primary | ICD-10-CM

## 2022-04-06 RX ORDER — OXYCODONE HYDROCHLORIDE 5 MG/1
5-10 TABLET ORAL
Qty: 60 TABLET | Refills: 0 | Status: CANCELLED | OUTPATIENT
Start: 2022-04-06 | End: 2022-04-13

## 2022-04-06 NOTE — TELEPHONE ENCOUNTER
Patient states she is in so much pain    Last Visit[place] 3[place]22[place]22 [place] [facility] [place] [facility]  Next Appointment: none  Previous Refill Encounter(s): 3/9/22 #60    Requested Prescriptions     Pending Prescriptions Disp Refills    oxyCODONE IR (ROXICODONE) 5 mg immediate release tablet 60 Tablet 0     Sig: Take 1-2 Tablets by mouth every four (4) hours as needed for Pain for up to 7 days. Max Daily Amount: 60 mg.

## 2022-04-11 ENCOUNTER — APPOINTMENT (OUTPATIENT)
Dept: PHYSICAL THERAPY | Age: 57
End: 2022-04-11

## 2022-04-13 ENCOUNTER — APPOINTMENT (OUTPATIENT)
Dept: PHYSICAL THERAPY | Age: 57
End: 2022-04-13

## 2022-07-06 ENCOUNTER — OFFICE VISIT (OUTPATIENT)
Dept: ORTHOPEDIC SURGERY | Age: 57
End: 2022-07-06
Payer: COMMERCIAL

## 2022-07-06 VITALS — BODY MASS INDEX: 41.77 KG/M2 | TEMPERATURE: 97.7 F | WEIGHT: 227 LBS | HEIGHT: 62 IN

## 2022-07-06 DIAGNOSIS — M65.341 ACQUIRED TRIGGER FINGER OF RIGHT RING FINGER: ICD-10-CM

## 2022-07-06 DIAGNOSIS — L03.011 CELLULITIS OF FINGER OF RIGHT HAND: Primary | ICD-10-CM

## 2022-07-06 DIAGNOSIS — M65.331 ACQUIRED TRIGGER FINGER OF RIGHT MIDDLE FINGER: ICD-10-CM

## 2022-07-06 PROCEDURE — 20550 NJX 1 TENDON SHEATH/LIGAMENT: CPT | Performed by: PHYSICIAN ASSISTANT

## 2022-07-06 PROCEDURE — 99213 OFFICE O/P EST LOW 20 MIN: CPT | Performed by: PHYSICIAN ASSISTANT

## 2022-07-06 RX ORDER — TRIAMCINOLONE ACETONIDE 40 MG/ML
20 INJECTION, SUSPENSION INTRA-ARTICULAR; INTRAMUSCULAR ONCE
Status: COMPLETED | OUTPATIENT
Start: 2022-07-06 | End: 2022-07-06

## 2022-07-06 RX ADMIN — TRIAMCINOLONE ACETONIDE 20 MG: 40 INJECTION, SUSPENSION INTRA-ARTICULAR; INTRAMUSCULAR at 13:40

## 2022-07-06 RX ADMIN — TRIAMCINOLONE ACETONIDE 20 MG: 40 INJECTION, SUSPENSION INTRA-ARTICULAR; INTRAMUSCULAR at 13:41

## 2022-07-06 NOTE — PROGRESS NOTES
Maine Louis  1965     HISTORY OF PRESENT ILLNESS  Maine Louis is a 65 y.o. female who presents today for evaluation s/p Incision and drainage of the right ring finger on 3/6/2022. She has not been seen since April. Patient was sent to OT but never went secondary to financial issues. Ring and middle finger feel locked now. Patient denies any fever, chills, chest pain, shortness of breath or calf pain. The remainder of the review of systems is negative. There are no new illness or injuries to report since last seen in the office. There are no changes to medications, allergies, family or social history. PHYSICAL EXAM:   Visit Vitals  Temp 97.7 °F (36.5 °C) (Temporal)   Ht 5' 2\" (1.575 m)   Wt 227 lb (103 kg)   BMI 41.52 kg/m²      The patient is a well-developed, well-nourished female in no acute distress. The patient is alert and oriented times three. The patient appears to be well groomed. Mood and affect are normal.  LYMPHATIC: lymph nodes are not enlarged and are within normal limits  SKIN: normal in color and non tender to palpation. There are no bruises or abrasions noted. NEUROLOGICAL: Motor sensory exam is within normal limits. Reflexes are equal bilaterally. There is normal sensation to pinprick and light touch   MUSCULOSKELETAL:  Examination Right Hand   Skin Intact   Deformity -   Swelling +   Tenderness +A1 pulley   Tenderness A1 Pulley +ring and middle   Finger flexion Full   Finger extension Full   Thenar Eminence Atrophy -   Sensation Normal   Capillary refill -   Heberden's nodes -   Dupuytren's -     Examination Right Wrist   Skin Intact   Tenderness -   Flexion 60   Extension 60   Deformity -   Effusion -   Tinnel's sign -   Phalen's test -   Finklestein maneuver -   Pain with thumb abduction -   Unable to make a fist either passive or actively.   PROCEDURE: Right hand Injection with Ultrasound Guidance    Indication:Right Hand pain/swelling    After sterile prep, 0.5 cc of Xylocaine and 0.5 cc of Kenalog were injected into the right A1 pulley ring and middle finger. Intra-tendon Ultrasound images captured using [address] [place] [address] Loop Ultrasound machine using a frequency of 10 MHz with a linear transducer and scanned into patient's chart. [facility]  OFFICE PROCEDURE PROGRESS NOTE        Chart reviewed for the following:  Edwar PAYNE PA, have reviewed the History, Physical and updated the Allergic reactions for Franklin Rivero     TIME OUT performed immediately prior to start of procedure:  Edwar PAYNE PA-C, have performed the following reviews on Franklin Rivero prior to the start of the procedure:            * Patient was identified by name and date of birth   * Agreement on procedure being performed was verified  * Risks and Benefits explained to the patient  * Procedure site verified and marked as necessary  * Patient was positioned for comfort  * Consent was signed and verified     Time: 1:26 PM    Date of procedure: 7/6/2022    Procedure performed by:  GREGORY Brooks    Provider assisted by: (see medication administration)    How tolerated by patient: tolerated the procedure well with no complications    Comments: none    IMPRESSION:      ICD-10-CM ICD-9-CM    1. Cellulitis of finger of right hand  L03.011 681.00 REFERRAL TO OCCUPATIONAL THERAPY   2. Acquired trigger finger of right middle finger  M65.331 727.03 REFERRAL TO OCCUPATIONAL THERAPY   3. Acquired trigger finger of right ring finger  M65.341 727.03 REFERRAL TO OCCUPATIONAL THERAPY        PLAN:  1. Patient with stiffness status post I&D of finger. Discussed with patient that this might be permanent at this point as she has not been able to get her finger moving yet. This patient was lost to follow-up which could be detrimental to her overall recovery. She has developed some trigger fingers in the middle and ring finger.   We will inject her with ultrasound guidance today. Recommended 1 session at least of occupational therapy. If she still has persistent problems we will refer her to Dr. Kathie Patrick for second opinion. Risk factors include: bmi>40  2. Yes cortisone injection indicated today   3. Yes Physical/Occupational Therapy indicated today  4. No diagnostic test indicated today:   5. No durable medical equipment indicated today  6. No referral indicated today   7. No medications indicated today:   8.  No Narcotic indicated today         RTC with Dr. Kathie Patrick in 3 weeks     RENALDO [facility]

## 2022-07-12 ENCOUNTER — HOSPITAL ENCOUNTER (OUTPATIENT)
Dept: PHYSICAL THERAPY | Age: 57
Discharge: HOME | End: 2022-07-12
Payer: COMMERCIAL

## 2022-07-12 PROCEDURE — 97535 SELF CARE MNGMENT TRAINING: CPT

## 2022-07-12 PROCEDURE — 97165 OT EVAL LOW COMPLEX 30 MIN: CPT

## 2022-07-12 PROCEDURE — 97110 THERAPEUTIC EXERCISES: CPT

## 2022-07-12 NOTE — PROGRESS NOTES
[facility]  [address] [facility] [address]  [place], [address]  [phone] fax    Plan of Care/Statement of Necessity for Occupational Therapy Services    Patient name: Gianfranco Vigil Start of Care: 2022   Referral source: Evaristo San : 1965    Medical Diagnosis: Pain in right hand [M79.641]  Payor: [facility] / Plan: [facility] PPO / Product Type: PPO /  Onset Date:2022    Treatment Diagnosis: right hand pain   Prior Hospitalization: see medical history Provider#: [id number]   Medications: Verified on Patient summary List    Comorbidities: Diabetes, arthritis, h/o stroke, HTN, MRSA   Prior Level of Function: (I) with ADL/IADL tasks without functional limitations and pain using dominant right hand.         The Plan of Care and following information is based on the information from the initial evaluation. Assessment/ key information:  Patient is a right hand dominant 65 y.o. female with a chief complaint  of right hand pain s/p incision and drainage of the right ring finger on 3/6/2022. She attended skilled OT for initial evaluation in March but failed to attend follow up appointments secondary to financial reasons. She is now c/o locking feeling in the right ring and middle fingers. She received steroid injections to the right ring and middle finger A1 pulleys and reports some relief from this. Pt presents to skilled OT rating her pain level 10/10 in the right hand. She demonstrates limited ability to make a composite fist with severe deficits in middle and ring finger RAND with 105 deg RAND of the middle digit and 96 deg RAND of the ring finger. She is able to oppose the thumb to all digits. There is tightness in the palm noted but no tenderness with palpation to the palm at the A1 pulleys at this time. Patient received an initial evaluation today followed by education as to diagnosis, precautions and treatment plan. Patient was provided with a basic home exercise program including digit blocking exercises, tendon glides, and intrinsic stretches. Pt hesitant to attend secondary to financial reasons. Therapist recommended at least 1x/wk to improve functional use of dominant hand and pt agrees to this. Skilled OT services are necessary to address aforementioned deficits to improve quality of life. Evaluation Complexity: History LOW Complexity : Brief history review  Examination LOW Complexity : 1-3 performance deficits relating to physical, cognitive , or psychosocial skils that result in activity limitations and / or participation restrictions  Clinical Decision Making MEDIUM Complexity : Patient may present with comorbidities that affect occupational performnce. Miniml to moderate modification of tasks or assistance (eg, physical or verbal ) with assesment(s) is necessary to enable patient to complete evaluation   Overall Complexity Rating: LOW     Patient would benefit from OT/Hand therapy services for the following problems:  Problem List: Pain effecting function, Decreased range of motion, Decreased strength, Edema effecting function, Decreased coordination/prehension, Decreased ADL/functional abilities , Decreased activity tolerance and Decreased flexibility/joint mobility     Treatment Plan may include any combination of the following: Therapeutic exercise, Therapeutic activities, Neuromuscular re-education, Physical agent/modality, Manual therapy, Patient education and ADLs/IADLs    Patient / Family readiness to learn indicated by: asking questions, trying to perform skills and interest    Persons(s) to be included in education:   patient (P)    Barriers to Learning/Limitations: None    Patient Goal (s): to get my hand back    Patient Self Reported Health Status: fair    Rehabilitation Potential: good    Short Term Goals:  To be accomplished in 2  weeks:  Goal:* Patient will be compliant with initial home exercise program to take an active role in their rehabilitation process. Status at Eval:  Patient was provided with a basic home exercise program including digit blocking exercises, tendon glides, and intrinsic stretches. Goal:* Patient will demonstrate a good understanding of their condition and strategies for self-management. Status at Eval: pt educated on prognosis, diagnosis, treatment plan, precautions, activity modifications, hand flexor anatomy    Long Term Goals: To be accomplished in 4 weeks:   Goal:*Patient will regain 220 degrees total arc of motion of the right middle finger to enable grasp of cylindrical objects such as a glass, handle or toothbrush. Status at Eval: right middle finger 105 deg RAND    Goal:*Patient will regain 220 degrees total arc of motion of the right ring finger to enable grasp of cylindrical objects such as a glass, handle or toothbrush. Status at Eval: right ring finger 96 deg RAND    Goal:*Patient will attain 0 degrees of right middle finger digital extension to enable her to reach into pocket. Status at Eval: 10 deg extension lag    Goal:* Pt will demonstrate ability to make a composite fist with right hand in order to keep coins secure in palm. Status at eval: unable    Goal:* Patient will show a 25 point improvement on FOTO functional status measure to improve overall functional performance. Status at Eval: 36    Frequency / Duration: Patient to be seen 1 times per week for 4 weeks:  Patient/ Caregiver education and instruction: Diagnosis, prognosis, self care, activity modification and exercises  [x]  Plan of care has been reviewed with Argentina Gracia OT 7/12/2022 11:52 AM  ________________________________________________________________________    I certify that the above Therapy Services are being furnished while the patient is under my care. I agree with the treatment plan and certify that this therapy is necessary.     [address] [facility] Signature:____________Date:_________TIME:________     GREGORY Mello  ** Signature, Date and Time must be completed for valid certification **    Please sign and return to [facility] [address] [place] [address] & [address]  [address]  [address], [facility] [address]  [phone] fax

## 2022-07-12 NOTE — PROGRESS NOTES
Hand Therapy Evaluation and Daily Note    Patient Name: Solitario Maxwell  Date:2022  : 1965  Age: 65 y.o.y/o  [x]  Patient  Verified  Payor: [employer] / Plan: [facility] PPO / Product Type: PPO /    Referring Provider: GREGORY Collins MD Visit:  None scheduled  Onset Date:  3/4/2022  Surgical Date: 3/6/2022  Surgical Procedure: s/p Incision and drainage of the right ring finger    In time:10:00 AM  Out time:10:49 AM  Total Treatment Time (min): 49  Total Timed Codes (min): 35  1:1 Treatment Time ([address] only): 52   Visit #: 1 of 4    Treatment Area: Pain in right hand [M79.641]    Precautions:    Hand Dominance: right handed   Hand Involved: right    Total Evaluation Time:  14    History of Present Condition:  Patient is a right hand dominant 65 y.o. female with a chief complaint  of right hand pain s/p incision and drainage of the right ring finger on 3/6/2022. She attended skilled OT for initial evaluation in March but failed to attend follow up appointments secondary to financial reasons. She is now c/o locking feeling in the right ring and middle fingers. She received steroid injections to the right ring and middle finger A1 pulleys and reports some relief from this. Pain Rating:   Current: (0-no pain 10-debilitating pain) severe   At best: (0-no pain 10-debilitating pain) severe  At worst: (0-no pain 10-debilitating pain) severe  Location: right hand  Type:  severe   Better with: nothing  Worse with: cooking, working    Medications/Allergies/Past Medical History:  See chart; reviewed with patient.  Diabetes, arthritis, h/o stroke, HTN, MRSA    Diagnostic Tests: none     Prior Level of Function: (I) with ADL/IADL tasks without functional limitations and pain using right hand.      Current Level of Function:  (I) with ADL/IADL tasks with functional limitations and pain using dominant right hand     Social History: Pt lives in home with spouse and daughter     Occupation/Job Requirements: CNA    Observation: limited AROM of right hand RF and MF  Scar/incision:   Dorsal ring finger prox phalanx   Location:  Right hand     Palpation:  No tenderness with palpation to right hand palm    Range of Motion:   Hand ROM   2nd 3rd 4th 5th Thumb   MP  0-45 0-30     PIP  10-58 12-60     DIP  0-12 0-18     PABD        RABD        RAND  105 96         Hand ROM AFTER therex   2nd 3rd 4th 5th Thumb   MP  82 74     PIP  10-85 12-70     DIP  12 15     PABD        RABD        RAND  169 147         Strength:  Unable to make a composite fist      Sensation:    NT      Edema: mild    Special Tests:       ADLs  Feeding:        []MaxA   []ModA   []Ghanshyam   [] CGA   []SBA   []Daniel   [x]Independent  UE Dressing:       []MaxA   []ModA   []Ghanshyam   [] CGA   []SBA   []Daniel   [x]Independent  LE Dressing:       []MaxA   []ModA   []Ghanshyam   [] CGA   []SBA   []Daniel   [x]Independent  Grooming:       []MaxA   []ModA   []Ghanshyam   [] CGA   []SBA   []Daniel   [x]Independent  Toileting:       []MaxA   []ModA   []Ghanshyam   [] CGA   []SBA   []Daniel   [x]Independent  Bathing:       []MaxA   []ModA   []Ghanshyam   [] CGA   []SBA   []Daniel   [x]Independent  Light Meal Prep:    []MaxA   []ModA   []Ghanshyam   [] CGA   []SBA   []Daniel   [x]Independent  Household/Other: []MaxA   []ModA   []Ghanshyam   [] CGA   []SBA   []Daniel   [x]Independent  Adaptive Equip:     []MaxA   []ModA   []Ghanshyam   [] CGA   []SBA   []Daniel   []Independent  Driving:       []MaxA   []ModA   []Ghanshyam   [] CGA   []SBA   []Daniel   [x]Independent      Todays Treatment:  Patient received an initial evaluation today followed by education as to diagnosis, precautions and treatment plan. Patient was provided with a basic home exercise program including digit blocking exercises, tendon glides, and intrinsic stretches. OBJECTIVE  Modality rationale: decrease pain and increase tissue extensibility to improve the patients ability to functionally use right hand.     Min Type Additional Details    [] Estim:  []Unatt       []IFC  []Premod                        []Other:  []w/ice   []w/heat  Position:  Location:    [] Estim: []Att    []TENS instruct  []NMES                    []Other:  []w/US   []w/ice   []w/heat  Position:  Location:    []  Traction: [] Cervical       []Lumbar                       [] Prone          []Supine                       []Intermittent   []Continuous Lbs:  [] before manual  [] after manual    []  Ultrasound: []Continuous   [] Pulsed                           []1MHz   []3MHz W/cm2:  Location:    []  Iontophoresis with dexamethasone         Location: [] Take home patch   [] In clinic   5 concurrent with self care []  Ice     [x]  Heat MHP  []  Ice massage  []  Laser   []  Paraffin Position:seated, resting  Location: right hand    []  Laser with stim  []  Other:  Position:  Location:    []  Vasopneumatic Device Pressure:       [] lo [] med [] hi   Temperature: [] lo [] med [] hi       [x] Skin assessment post-treatment:  [x]intact [x]redness- no adverse reaction    25 min Therapeutic Exercise:  [x] See flow sheet :   Rationale: increase ROM to improve the patients ability to make a composite fist with right hand     10 min Self Care/Home Management: prognosis, diagnosis, treatment plan, precautions, activity modifications, hand flexor anatomy   Rationale: education  to improve the patients ability to return to normal use of dominant right hand    With   [] TE   [] TA   [] neuro   [] other: Patient Education: [x] Review HEP    [] Progressed/Changed HEP based on:   [] positioning   [] body mechanics   [] transfers   [] heat/ice application   [] Splint wear/care   [] Sensory re-education   [] scar management      [] other:      Pain Level (0-10 scale) post treatment: 6/10    Assessment: Pt presents to skilled OT rating her pain level 10/10 in the right hand.   She demonstrates limited ability to make a composite fist with severe deficits in middle and ring finger RAND with 105 deg RAND of the middle digit and 96 deg RAND of the ring finger. She is able to oppose the thumb to all digits. There is tightness in the palm noted but no tenderness with palpation to the palm at the A1 pulleys at this time. Evaluation Complexity: History LOW Complexity : Brief history review  Examination LOW Complexity : 1-3 performance deficits relating to physical, cognitive , or psychosocial skils that result in activity limitations and / or participation restrictions  Clinical Decision Making MEDIUM Complexity : Patient may present with comorbidities that affect occupational performnce. Miniml to moderate modification of tasks or assistance (eg, physical or verbal ) with assesment(s) is necessary to enable patient to complete evaluation   Overall Complexity Rating: LOW     Patient would benefit from OT/Hand therapy services for the following problems:  Problem List: Pain effecting function, Decreased range of motion, Decreased strength, Edema effecting function, Decreased coordination/prehension, Decreased ADL/functional abilities , Decreased activity tolerance and Decreased flexibility/joint mobility     Treatment Plan may include any combination of the following: Therapeutic exercise, Therapeutic activities, Neuromuscular re-education, Physical agent/modality, Manual therapy, Patient education and ADLs/IADLs    Patient / Family readiness to learn indicated by: asking questions, trying to perform skills and interest    Persons(s) to be included in education:   patient (P)    Barriers to Learning/Limitations: None    Patient Goal (s): to get my hand back    Patient Self Reported Health Status: fair    Rehabilitation Potential: good    Short Term Goals: To be accomplished in 2  weeks:  Goal:* Patient will be compliant with initial home exercise program to take an active role in their rehabilitation process.   Status at al:  Patient was provided with a basic home exercise program including digit blocking exercises, tendon glides, and intrinsic stretches. Goal:* Patient will demonstrate a good understanding of their condition and strategies for self-management. Status at Eval: pt educated on prognosis, diagnosis, treatment plan, precautions, activity modifications, hand flexor anatomy    Long Term Goals: To be accomplished in 4 weeks:   Goal:*Patient will regain 220 degrees total arc of motion of the right middle finger to enable grasp of cylindrical objects such as a glass, handle or toothbrush. Status at Eval: right middle finger 105 deg RAND    Goal:*Patient will regain 220 degrees total arc of motion of the right ring finger to enable grasp of cylindrical objects such as a glass, handle or toothbrush. Status at Eval: right ring finger 96 deg RAND    Goal:*Patient will attain 0 degrees of right middle finger digital extension to enable her to reach into pocket. Status at Eval: 10 deg extension lag    Goal:* Pt will demonstrate ability to make a composite fist with right hand in order to keep coins secure in palm. Status at eval: unable    Goal:* Patient will show a 25 point improvement on FOTO functional status measure to improve overall functional performance.   Status at Eval: 36    Frequency / Duration: Patient to be seen 1 times per week for 4 weeks:    Patient/ Caregiver education and instruction: Diagnosis, prognosis, self care, activity modification and exercises    Albert Yates OT,7/12/2022 9:48 AM

## 2022-07-21 ENCOUNTER — HOSPITAL ENCOUNTER (OUTPATIENT)
Dept: PHYSICAL THERAPY | Age: 57
Discharge: HOME | End: 2022-07-21
Payer: COMMERCIAL

## 2022-07-21 PROCEDURE — 97530 THERAPEUTIC ACTIVITIES: CPT

## 2022-07-21 PROCEDURE — 97110 THERAPEUTIC EXERCISES: CPT

## 2022-07-21 PROCEDURE — 97018 PARAFFIN BATH THERAPY: CPT

## 2022-07-21 NOTE — PROGRESS NOTES
OT DAILY TREATMENT NOTE     Patient Name: Ezekiel Organ  Date:2022  : 1965  [x]  Patient  Verified  Payor: BLUE CROSS / Plan: [facility] PPO / Product Type: PPO /    In time:4:18  Out time:5:01  Total Treatment Time (min): 43  Visit #: 2 of 4    Medicare/BCBS Only   Total Timed Codes (min):  33 1:1 Treatment Time:  43     Treatment Area: Pain in right hand [M79.641]    SUBJECTIVE  Pain Level (0-10 scale): 4/10  Any medication changes, allergies to medications, adverse drug reactions, diagnosis change, or new procedure performed?: [x] No    [] Yes (see summary sheet for update)  Subjective functional status/changes:   [] No changes reported    \"I used a knife today to cut a lemon\"    OBJECTIVE    Modality rationale: decrease inflammation, decrease pain, and increase tissue extensibility to improve the patients ability to move digits and  during functional daily activities.    Min Type Additional Details    [] Estim:  []Unatt       []IFC  []Premod                        []Other:  []w/ice   []w/heat  Position:  Location:    [] Estim: []Att    []TENS instruct  []NMES                    []Other:  []w/US   []w/ice   []w/heat  Position:  Location:    []  Traction: [] Cervical       []Lumbar                       [] Prone          []Supine                       []Intermittent   []Continuous Lbs:  [] before manual  [] after manual    []  Ultrasound: []Continuous   [] Pulsed                           []1MHz   []3MHz W/cm2:  Location:    []  Iontophoresis with dexamethasone         Location: [] Take home patch   [] In clinic   10 min []  Ice     []  heat  []  Ice massage  []  Laser   [x]  Paraffin Position: seated/resting  Location: right wrist/hand    []  Laser with stim  []  Other:  Position:  Location:    []  Vasopneumatic Device    []  Right     []  Left  Pre-treatment girth:  Post-treatment girth:  Measured at (location):  Pressure:       [] lo [] med [] hi   Temperature: [] lo [] med [] hi     [x] Skin assessment post-treatment:  [x]intact []redness- no adverse reaction    []redness - adverse reaction:     20 min Therapeutic Exercise:  [] See flow sheet :   Rationale: increase ROM to improve the patients ability to to move digits and  during functional daily tasks. Right hand    Intrinsic stretches  Digit PROM- MR and RF each joint  Digit blocking AROM- MF and RF each joints  Tendon glides         13 min Therapeutic Activity:  []  See flow sheet :   Rationale: increase ROM and improve coordination  to improve the patients ability to manipulate small items. Right hand    Palm to digit translation- securing marbles into hand  Dexterity balls- cw/ccw 10 times  Oppositions with marbles  Finger abd/add with marbles        With   [] TE   [] TA   [] neuro   [] other: Patient Education: [x] Review HEP    [x] Progressed/Changed HEP based on: pt required mod positioning cues for digit blocking AROM HEP. [] positioning   [] body mechanics   [] transfers   [] heat/ice application   [] Splint wear/care   [] Sensory re-education   [] scar management      [] other:            Other Objective/Functional Measures:     Pt tolerated all digit ROM exercises  Increased time to complete fine motor activities     Pain Level (0-10 scale) post treatment: 4/10    ASSESSMENT/Changes in Function: decreased pain after heat modality and ROM exercises, pt required increased time to complete fine motor coordination activities due to limited digit ROM, pt required mod verbal cueing for positioning during digit blocking exercises, improving digit ROM since initiate evaluation. Patient will continue to benefit from skilled OT services to address ROM deficits, address strength deficits, analyze and address soft tissue restrictions, analyze and modify body mechanics/ergonomics, and assess and modify postural abnormalities to attain remaining goals.      [x]  See Plan of Care  []  See progress note/recertification  []  See Discharge Summary         Progress towards goals / Updated goals:    Short Term Goals: To be accomplished in 2  weeks:  Goal:* Patient will be compliant with initial home exercise program to take an active role in their rehabilitation process. Status at Eval:  Patient was provided with a basic home exercise program including digit blocking exercises, tendon glides, and intrinsic stretches. 7/21/22- pt required mod verbal cueing for digit blocking HEP. Goal:* Patient will demonstrate a good understanding of their condition and strategies for self-management. Status at Eval: pt educated on prognosis, diagnosis, treatment plan, precautions, activity modifications, hand flexor anatomy     Long Term Goals: To be accomplished in 4 weeks:          Goal:*Patient will regain 220 degrees total arc of motion of the right middle finger to enable grasp of cylindrical objects such as a glass, handle or toothbrush. Status at Eval: right middle finger 105 deg RAND     Goal:*Patient will regain 220 degrees total arc of motion of the right ring finger to enable grasp of cylindrical objects such as a glass, handle or toothbrush. Status at Eval: right ring finger 96 deg RAND     Goal:*Patient will attain 0 degrees of right middle finger digital extension to enable her to reach into pocket. Status at Eval: 10 deg extension lag     Goal:* Pt will demonstrate ability to make a composite fist with right hand in order to keep coins secure in palm. Status at eval: unable  7/21/22- pt unable to make composite fist.     Goal:* Patient will show a 25 point improvement on FOTO functional status measure to improve overall functional performance.   Status at Eval: 39    PLAN  []  Upgrade activities as tolerated     [x]  Continue plan of care  []  Update interventions per flow sheet       []  Discharge due to:_  []  Other:_      Maria Elena Sandoval 7/21/2022  4:20 PM    Future Appointments   Date Time Provider Desmond Carrol   7/21/2022  4:45 PM Verline [place], OT [facility]   7/26/2022  4:45 PM Verline [place], OT [facility]   8/4/2022  1:00 PM Verline [place], OT [facility]

## 2022-07-26 ENCOUNTER — TELEPHONE (OUTPATIENT)
Dept: PHYSICAL THERAPY | Age: 57
End: 2022-07-26

## 2022-07-26 ENCOUNTER — APPOINTMENT (OUTPATIENT)
Dept: PHYSICAL THERAPY | Age: 57
End: 2022-07-26
Payer: COMMERCIAL

## 2022-08-04 ENCOUNTER — HOSPITAL ENCOUNTER (OUTPATIENT)
Dept: PHYSICAL THERAPY | Age: 57
Discharge: HOME | End: 2022-08-04
Payer: COMMERCIAL

## 2022-08-04 PROCEDURE — 97530 THERAPEUTIC ACTIVITIES: CPT

## 2022-08-04 PROCEDURE — 97110 THERAPEUTIC EXERCISES: CPT

## 2022-08-04 PROCEDURE — 97018 PARAFFIN BATH THERAPY: CPT

## 2022-08-04 NOTE — PROGRESS NOTES
[facility]  [address] [facility] [address]  [place], [address]  [phone] fax    Occupational Therapy Progress Note  Patient name: Delfina Goldberg Start of Care: 2022   Referral source: Tulinette ArielleMulu gallardoma : 1965   Medical/Treatment Diagnosis: Pain in right hand [M79.641]  Payor: [facility] / Plan: [facility] PPO / Product Type: PPO /  Onset Date:2022     Prior Hospitalization: see medical history Provider#: [id number]   Medications: Verified on Patient Summary List     Comorbidities: Diabetes, arthritis, h/o stroke, HTN, MRSA   Prior Level of Function: (I) with ADL/IADL tasks without functional limitations and pain using dominant right hand. Visits from Start of Care: 3    Missed Visits: 1    Established Goals:  Short Term Goals: To be accomplished in 2  weeks:  Goal:* Patient will be compliant with initial home exercise program to take an active role in their rehabilitation process. Status at Eval:  Patient was provided with a basic home exercise program including digit blocking exercises, tendon glides, and intrinsic stretches. 22- pt required mod verbal cueing for digit blocking HEP. Status at PN 2022 -moderate cueing for HEP, progressing    Goal:* Patient will demonstrate a good understanding of their condition and strategies for self-management. Status at Eval: pt educated on prognosis, diagnosis, treatment plan, precautions, activity modifications, hand flexor anatomy   Status at PN 2022 -pt demo good understanding of hand flexor anatomy, goal met    Long Term Goals: To be accomplished in 4 weeks:          Goal:*Patient will regain 220 degrees total arc of motion of the right middle finger to enable grasp of cylindrical objects such as a glass, handle or toothbrush.   Status at Eval: right middle finger 105 deg RAND   Status at PN 2022 -160 deg RAND, regressed    Goal:*Patient will regain 220 degrees total arc of motion of the right ring finger to enable grasp of cylindrical objects such as a glass, handle or toothbrush. Status at Eval: right ring finger 96 deg RAND   Status at PN 8/4/2022 -160 deg RAND, progressing    Goal:*Patient will attain 0 degrees of right middle finger digital extension to enable her to reach into pocket. Status at Eval: 10 deg extension lag   Status at PN 8/4/2022 -8 deg extension lag, progressing    Goal:* Pt will demonstrate ability to make a composite fist with right hand in order to keep coins secure in palm. Status at eval: unable  7/21/22- pt unable to make composite fist.   Status at PN 8/4/2022 -unable, no change    Goal:* Patient will show a 25 point improvement on FOTO functional status measure to improve overall functional performance. Status at Eval: 36  Status at PN 8/4/2022 -50, progressing     Key Functional Changes: reduced pain, improving functional use    Updated Goals: to be achieved in 4 weeks:  Short Term Goals: To be accomplished in 2  weeks:  Goal:* Patient will be compliant with initial home exercise program to take an active role in their rehabilitation process. Status at PN 8/4/2022 -moderate cueing for HEP           Goal:*Patient will regain 220 degrees total arc of motion of the right middle finger to enable grasp of cylindrical objects such as a glass, handle or toothbrush. Status at PN 8/4/2022 -160 deg RAND    Goal:*Patient will regain 220 degrees total arc of motion of the right ring finger to enable grasp of cylindrical objects such as a glass, handle or toothbrush. Status at PN 8/4/2022 -160 deg RAND    Goal:*Patient will attain 0 degrees of right middle finger digital extension to enable her to reach into pocket. Status at PN 8/4/2022 -8 deg extension lag    Goal:* Pt will demonstrate ability to make a composite fist with right hand in order to keep coins secure in palm.   Status at  8/4/2022 -unable    Goal:* Patient will show a 25 point improvement on FOTO functional status measure to improve overall functional performance. Status at PN 8/4/2022 -50    Goal:* Pt will have 15 pounds of  in the right hand to allow for functional grasp for all ADL activities including dressing, bathing and self care. Status at PN 8/4/2022 - 8#    ASSESSMENT/RECOMMENDATIONS:slow progress towards goals due to limited treatment sessions attended. Pt reports financial issues attending skilled OT more than once per week. [x]Continue therapy per initial plan/protocol at a frequency of  1-2 x per week for 4 weeks  []Continue therapy with the following recommended changes:_____________________      _____________________________________________________________________  []Discontinue therapy progressing towards or have reached established goals  []Discontinue therapy due to lack of appreciable progress towards goals  []Discontinue therapy due to lack of attendance or compliance  []Await Physician's recommendations/decisions regarding therapy  []Other:________________________________________________________________    Thank you for this referral.   Luis A Cannon, OT 8/4/2022 1:27 PM  NOTE TO PHYSICIAN:  PLEASE COMPLETE THE ORDERS BELOW AND   FAX TO [facility]: [phone]  If you are unable to process this request in 24 hours please contact our office: [phone]    I have read the above report and request that my patient continue as recommended. I have read the above report and request that my patient continue therapy with the following changes/special instructions:__________________________________________________________  I have read the above report and request that my patient be discharged from therapy.     [de-identified] Signature:____________Date:_________TIME:________     GREGORY Mello  ** Signature, Date and Time must be completed for valid certification **

## 2022-08-04 NOTE — PROGRESS NOTES
OT DAILY TREATMENT NOTE     Patient Name: Keegan Mejia  Date:2022  : 1965  [x]  Patient  Verified  Payor: BLUE CROSS / Plan: [facility] PPO / Product Type: PPO /    In time:1:07 PM  Out time:1:49 PM  Total Treatment Time (min): 42  Visit #: 3 of 4    Medicare/BCBS Only   Total Timed Codes (min):  32 1:1 Treatment Time:  42     Treatment Area: Pain in right hand [M79.641]    SUBJECTIVE  Pain Level (0-10 scale): 5/10  Any medication changes, allergies to medications, adverse drug reactions, diagnosis change, or new procedure performed?: [x] No    [] Yes (see summary sheet for update)  Subjective functional status/changes:   [] No changes reported  \"I do my exercises in the morning. I can't come more than once a week. \"    OBJECTIVE    Modality rationale: decrease pain and increase tissue extensibility to improve the patients ability to make a composite fist with right hand   Min Type Additional Details    [] Estim:  []Unatt       []IFC  []Premod                        []Other:  []w/ice   []w/heat  Position:  Location:    [] Estim: []Att    []TENS instruct  []NMES                    []Other:  []w/US   []w/ice   []w/heat  Position:  Location:    []  Traction: [] Cervical       []Lumbar                       [] Prone          []Supine                       []Intermittent   []Continuous Lbs:  [] before manual  [] after manual    []  Ultrasound: []Continuous   [] Pulsed                           []1MHz   []3MHz W/cm2:  Location:    []  Iontophoresis with dexamethasone         Location: [] Take home patch   [] In clinic   10 []  Ice     []  heat  []  Ice massage  []  Laser   [x]  Paraffin Position:seated, resting  Location: right hand    []  Laser with stim  []  Other:  Position:  Location:    []  Vasopneumatic Device    []  Right     []  Left  Pre-treatment girth:  Post-treatment girth:  Measured at (location):  Pressure:       [] lo [] med [] hi   Temperature: [] lo [] med [] hi [x] Skin assessment post-treatment:  [x]intact [x]redness- no adverse reaction    []redness - adverse reaction:     17 min Therapeutic Exercise:  [x] See flow sheet :   Rationale: increase ROM and increase strength to improve the patients ability to grasp, , make a composite fist with right hand    15 min Therapeutic Activity:  [x]  See flow sheet :   Rationale: increase ROM and improve coordination  to improve the patients ability to manipulate items using right hand. With   [] TE   [] TA   [] neuro   [] other: Patient Education: [x] Review HEP    [] Progressed/Changed HEP based on:   [] positioning   [] body mechanics   [] transfers   [] heat/ice application   [] Splint wear/care   [] Sensory re-education   [] scar management      [] other:            Other Objective/Functional Measures:      Hand ROM AFTER therex 7/12/2022 right    2nd 3rd 4th 5th Thumb   MP   82 74       PIP   10-85 12-70       DIP   12 15       PABD             RABD             RAND   169 147          Hand ROM 8/4/2022 right   2nd 3rd 4th 5th Thumb   MP  81 77     PIP  8-84 14-85     DIP  3 12     PABD        RABD        RAND  160 160        Measurements: Taken with [employer]ron Dynamometer, in Lbs   Level 2 8/4/2022    Right 8   Left 50   Deficit    Change           Pain Level (0-10 scale) post treatment: 4/10    ASSESSMENT/Changes in Function: Pt has not been seen for skilled OT since 7/21/2022. Pt is only able to attend skilled OT 1x perday week secondary to financial issues. She requires moderate verbal and physical cueing for her HEP which she states she is completing once per day. There is no improvement in the middle digit RAND and there is 13 deg improvement in the 4th digit AROM. She is unable to make a composite fist therefore her  strength is severely limited. Re-educated on importance of HEP compliance to improve functional use of the right hand. Educated pt to perform HEP 3x per day.   Initiated soft therapy putty hand and  strengthening HEP and pt demonstrated minimal difficulty with this during this treatment session. Patient will continue to benefit from skilled OT services to modify and progress therapeutic interventions, address ROM deficits, address strength deficits, analyze and address soft tissue restrictions, and instruct in home and community integration to attain remaining goals. []  See Plan of Care  [x]  See progress note/recertification  []  See Discharge Summary         Progress towards goals / Updated goals:  Short Term Goals: To be accomplished in 2  weeks:  Goal:* Patient will be compliant with initial home exercise program to take an active role in their rehabilitation process. Status at Eval:  Patient was provided with a basic home exercise program including digit blocking exercises, tendon glides, and intrinsic stretches. 7/21/22- pt required mod verbal cueing for digit blocking HEP. Status at PN 8/4/2022 -moderate cueing for HEP, progressing    Goal:* Patient will demonstrate a good understanding of their condition and strategies for self-management. Status at Eval: pt educated on prognosis, diagnosis, treatment plan, precautions, activity modifications, hand flexor anatomy   Status at PN 8/4/2022 -pt demo good understanding of hand flexor anatomy, goal met    Long Term Goals: To be accomplished in 4 weeks:          Goal:*Patient will regain 220 degrees total arc of motion of the right middle finger to enable grasp of cylindrical objects such as a glass, handle or toothbrush. Status at Eval: right middle finger 105 deg RAND   Status at PN 8/4/2022 -160 deg RAND, regressed    Goal:*Patient will regain 220 degrees total arc of motion of the right ring finger to enable grasp of cylindrical objects such as a glass, handle or toothbrush.   Status at Eval: right ring finger 96 deg RAND   Status at PN 8/4/2022 -160 deg RAND, progressing    Goal:*Patient will attain 0 degrees of right middle finger digital extension to enable her to reach into pocket. Status at Eval: 10 deg extension lag   Status at PN 8/4/2022 -8 deg extension lag, progressing    Goal:* Pt will demonstrate ability to make a composite fist with right hand in order to keep coins secure in palm. Status at eval: unable  7/21/22- pt unable to make composite fist.   Status at PN 8/4/2022 -unable, no change    Goal:* Patient will show a 25 point improvement on FOTO functional status measure to improve overall functional performance. Status at Eval: 36  Status at PN 8/4/2022 -50, progressing    PLAN  [x]  Upgrade activities as tolerated     [x]  Continue plan of care  []  Update interventions per flow sheet       []  Discharge due to:_  []  Other:_      Carmen Grier OT 8/4/2022  1:13 PM    No future appointments.

## 2022-08-09 ENCOUNTER — HOSPITAL ENCOUNTER (OUTPATIENT)
Dept: PHYSICAL THERAPY | Age: 57
Discharge: HOME | End: 2022-08-09
Payer: COMMERCIAL

## 2022-08-09 PROCEDURE — 97018 PARAFFIN BATH THERAPY: CPT

## 2022-08-09 PROCEDURE — 97535 SELF CARE MNGMENT TRAINING: CPT

## 2022-08-09 PROCEDURE — 97110 THERAPEUTIC EXERCISES: CPT

## 2022-08-09 NOTE — PROGRESS NOTES
OT DAILY TREATMENT NOTE     Patient Name: Terra Gurrola  Date:2022  : 1965  [x]  Patient  Verified  Payor: BLUE CROSS / Plan: [facility] PPO / Product Type: PPO /    In time:2:33  Out time:3:16  Total Treatment Time (min): 43  Visit #: 1 of 8    Medicare/BCBS Only   Total Timed Codes (min):  33 1:1 Treatment Time:  43     Treatment Area: Pain in right hand [M79.641]    SUBJECTIVE  Pain Level (0-10 scale): 910 digits and wrist  Any medication changes, allergies to medications, adverse drug reactions, diagnosis change, or new procedure performed?: [x] No    [] Yes (see summary sheet for update)  Subjective functional status/changes:   [] No changes reported    \"I am in a lot of pain today\"  \"My hand didn't keep me from doing dishes today\"  \"I may have over used my hand at work\"     OBJECTIVE    Modality rationale: decrease pain and increase tissue extensibility to improve the patients ability to make a composite fist with right hand.    Min Type Additional Details      [] Estim:  []Unatt       []IFC  []Premod                        []Other: []w/ice   []w/heat  Position:  Location:      [] Estim: []Att    []TENS instruct  []NMES                    []Other: []w/US   []w/ice   []w/heat  Position:  Location:      []  Traction: [] Cervical       []Lumbar                       [] Prone          []Supine                       []Intermittent   []Continuous Lbs:  [] before manual  [] after manual      []  Ultrasound: []Continuous   [] Pulsed                           []1MHz   []3MHz W/cm2:  Location:      []  Iontophoresis with dexamethasone        Location: [] Take home patch  [] In clinic    10 []  Ice     []  heat  []  Ice massage  []  Laser  [x]  Paraffin Position:seated, resting  Location: right hand/wrist      []  Laser with stim  []  Other: Position:  Location:      []  Vasopneumatic Device    []  Right     []  Left  Pre-treatment girth:  Post-treatment girth:  Measured at (location): Pressure:       [] lo [] med [] hi  Temperature: [] lo [] med [] hi       [x] Skin assessment post-treatment:  [x]intact [x]redness- no adverse reaction    []redness - adverse reaction:     23 min Therapeutic Exercise:  [] See flow sheet :   Rationale: increase ROM to improve the patients ability to move digits and make a composite fist.     Right hand: Towel scrunches   Intrinsic stretches   Hookfist with marker   1/4\" peg removal from soft putty   Putty HEP - making food items. Small yellow tbar 3 ways  Ball rolls      10 min Self Care/Home Management: activity modification strategies, adaptive devices for meal preparation. Rationale:  patient education   to improve the patients ability to self-manage symptoms and improve participation in functional daily activities with activity modification strategies. With   [] TE   [] TA   [] neuro   [] other: Patient Education: [x] Review HEP    [x] Progressed/Changed HEP based on: min cues to perform digit ROM and putty HEP with proper positioning. [] positioning   [] body mechanics   [] transfers   [] heat/ice application   [] Splint wear/care   [] Sensory re-education   [] scar management      [] other:            Other Objective/Functional Measures:     Pt unable to make a composite fist   No pain with gentle strengthening exercises. Pain Level (0-10 scale) post treatment: 6/10    ASSESSMENT/Changes in Function: difficulty performing a composite fist, pt tolerated all strengthening well, pt educated on adaptive devices to improve independence with meal preparation/cooking tasks, decreased pain with heat modalities. Patient will continue to benefit from skilled OT services to address ROM deficits, address strength deficits, analyze and address soft tissue restrictions, analyze and cue movement patterns, and analyze and modify body mechanics/ergonomics to attain remaining goals.      [x]  See Plan of Care  []  See progress note/recertification  []  See Discharge Summary         Progress towards goals / Updated goals:    Short Term Goals: To be accomplished in 2  weeks:  Goal:* Patient will be compliant with initial home exercise program to take an active role in their rehabilitation process. Status at PN 8/4/2022 -moderate cueing for HEP           Goal:*Patient will regain 220 degrees total arc of motion of the right middle finger to enable grasp of cylindrical objects such as a glass, handle or toothbrush. Status at PN 8/4/2022 -160 deg RAND     Goal:*Patient will regain 220 degrees total arc of motion of the right ring finger to enable grasp of cylindrical objects such as a glass, handle or toothbrush. Status at PN 8/4/2022 -160 deg RAND     Goal:*Patient will attain 0 degrees of right middle finger digital extension to enable her to reach into pocket. Status at PN 8/4/2022 -8 deg extension lag     Goal:* Pt will demonstrate ability to make a composite fist with right hand in order to keep coins secure in palm. Status at PN 8/4/2022 -unable     Goal:* Patient will show a 25 point improvement on FOTO functional status measure to improve overall functional performance. Status at PN 8/4/2022 -50     Goal:* Pt will have 15 pounds of  in the right hand to allow for functional grasp for all ADL activities including dressing, bathing and self care.   Status at PN 8/4/2022 - 8#    PLAN  []  Upgrade activities as tolerated     [x]  Continue plan of care  []  Update interventions per flow sheet       []  Discharge due to:_  []  Other:_      ESTELITA Rodriguez 8/9/2022  7:59 AM    Future Appointments   Date Time Provider Desmond Branham   8[facility]9/2022  2:30 [facility]   8/18/2022  1:00 PM Andreia Sherwood [facility]   8/23/2022 12:15 PM Andreia Sherwood OT [facility]   8/29/2022  4:00 PM ESTELITA Martin [facility]

## 2022-08-18 ENCOUNTER — APPOINTMENT (OUTPATIENT)
Dept: PHYSICAL THERAPY | Age: 57
End: 2022-08-18
Payer: COMMERCIAL

## 2022-08-23 ENCOUNTER — APPOINTMENT (OUTPATIENT)
Dept: PHYSICAL THERAPY | Age: 57
End: 2022-08-23
Payer: COMMERCIAL

## 2022-08-29 ENCOUNTER — TELEPHONE (OUTPATIENT)
Dept: PHYSICAL THERAPY | Age: 57
End: 2022-08-29

## 2022-09-08 NOTE — PROGRESS NOTES
[facility]  [address] [facility] [address]  [place], [address]  [phone] fax    Occupational Therapy Discharge Summary    Patient name: Ele Schaffer Start of Care: 2022   Referral source: Alejandrina Elizabeth, [address] : 1965   Medical/Treatment Diagnosis: Pain in right hand [M79.641]  Payor: [employer] / Plan: [facility] PPO / Product Type: PPO /  Onset Date:2022     Prior Hospitalization: see medical history Provider#: [id number]   Medications: Verified on Patient Summary List     Comorbidities: Diabetes, arthritis, h/o stroke, HTN, MRSA   Prior Level of Function: (I) with ADL/IADL tasks without functional limitations and pain using dominant right hand. Visits from Start of Care: 4    Missed Visits: 4  Reporting Period : 2022 to 2022    Summary of Care:  Short Term Goals: To be accomplished in 2  weeks:  Goal:* Patient will be compliant with initial home exercise program to take an active role in their rehabilitation process. Status at PN 2022 -moderate cueing for HEP           Goal:*Patient will regain 220 degrees total arc of motion of the right middle finger to enable grasp of cylindrical objects such as a glass, handle or toothbrush. Status at PN 2022 -160 deg RAND     Goal:*Patient will regain 220 degrees total arc of motion of the right ring finger to enable grasp of cylindrical objects such as a glass, handle or toothbrush. Status at PN 2022 -160 deg RAND     Goal:*Patient will attain 0 degrees of right middle finger digital extension to enable her to reach into pocket. Status at PN 2022 -8 deg extension lag     Goal:* Pt will demonstrate ability to make a composite fist with right hand in order to keep coins secure in palm. Status at PN 2022 -unable     Goal:* Patient will show a 25 point improvement on FOTO functional status measure to improve overall functional performance.   Status at PN 8/4/2022 -50     Goal:* Pt will have 15 pounds of  in the right hand to allow for functional grasp for all ADL activities including dressing, bathing and self care. Status at PN 8/4/2022 - 8#    ASSESSMENT/RECOMMENDATIONS: unable to further assess goals due to pt abdicated therapy. Attempted to contact patient to schedule appointments and phone number was not in service. It has been 30 days since this patient has been seen for skilled OT. Will d/c at this time and reassess in the future if medically necessary.  Thank you for this referral.     [x]Discontinue therapy: []Patient has reached or is progressing toward set goals      [x]Patient is non-compliant or has abdicated      []Due to lack of appreciable progress towards set [address] Schaffer,  9/8/2022 3:21 PM

## 2023-03-17 ENCOUNTER — TRANSCRIBE ORDERS (OUTPATIENT)
Facility: HOSPITAL | Age: 58
End: 2023-03-17

## 2023-03-17 DIAGNOSIS — Z12.31 ENCOUNTER FOR SCREENING MAMMOGRAM FOR BREAST CANCER: Primary | ICD-10-CM

## 2023-04-13 ENCOUNTER — HOSPITAL ENCOUNTER (OUTPATIENT)
Facility: HOSPITAL | Age: 58
Discharge: HOME | End: 2023-04-16
Payer: COMMERCIAL

## 2023-04-13 DIAGNOSIS — Z12.31 ENCOUNTER FOR SCREENING MAMMOGRAM FOR BREAST CANCER: ICD-10-CM

## 2023-04-13 PROCEDURE — 77063 BREAST TOMOSYNTHESIS BI: CPT

## 2024-05-14 ENCOUNTER — TRANSCRIBE ORDERS (OUTPATIENT)
Facility: HOSPITAL | Age: 59
End: 2024-05-14

## 2024-05-14 DIAGNOSIS — Z12.31 VISIT FOR SCREENING MAMMOGRAM: Primary | ICD-10-CM

## 2024-06-04 ENCOUNTER — HOSPITAL ENCOUNTER (OUTPATIENT)
Facility: HOSPITAL | Age: 59
Discharge: HOME | End: 2024-06-07
Payer: COMMERCIAL

## 2024-06-04 VITALS — BODY MASS INDEX: 35.17 KG/M2 | WEIGHT: 206 LBS | HEIGHT: 64 IN

## 2024-06-04 DIAGNOSIS — Z12.31 VISIT FOR SCREENING MAMMOGRAM: ICD-10-CM

## 2024-06-04 PROCEDURE — 77063 BREAST TOMOSYNTHESIS BI: CPT

## 2024-08-26 ENCOUNTER — HOSPITAL ENCOUNTER (EMERGENCY)
Facility: HOSPITAL | Age: 59
Discharge: HOME | End: 2024-08-26
Attending: STUDENT IN AN ORGANIZED HEALTH CARE EDUCATION/TRAINING PROGRAM
Payer: COMMERCIAL

## 2024-08-26 ENCOUNTER — APPOINTMENT (OUTPATIENT)
Facility: HOSPITAL | Age: 59
End: 2024-08-26
Attending: STUDENT IN AN ORGANIZED HEALTH CARE EDUCATION/TRAINING PROGRAM
Payer: COMMERCIAL

## 2024-08-26 VITALS
RESPIRATION RATE: 20 BRPM | SYSTOLIC BLOOD PRESSURE: 131 MMHG | BODY MASS INDEX: 36.86 KG/M2 | WEIGHT: 208 LBS | DIASTOLIC BLOOD PRESSURE: 53 MMHG | OXYGEN SATURATION: 99 % | HEIGHT: 63 IN | HEART RATE: 94 BPM | TEMPERATURE: 97.9 F

## 2024-08-26 DIAGNOSIS — M79.605 LEFT LEG PAIN: Primary | ICD-10-CM

## 2024-08-26 DIAGNOSIS — M54.17 LUMBOSACRAL RADICULOPATHY: ICD-10-CM

## 2024-08-26 PROCEDURE — 96372 THER/PROPH/DIAG INJ SC/IM: CPT

## 2024-08-26 PROCEDURE — 99284 EMERGENCY DEPT VISIT MOD MDM: CPT

## 2024-08-26 PROCEDURE — 6360000002 HC RX W HCPCS

## 2024-08-26 PROCEDURE — 6370000000 HC RX 637 (ALT 250 FOR IP)

## 2024-08-26 PROCEDURE — 73502 X-RAY EXAM HIP UNI 2-3 VIEWS: CPT

## 2024-08-26 PROCEDURE — 6370000000 HC RX 637 (ALT 250 FOR IP): Performed by: STUDENT IN AN ORGANIZED HEALTH CARE EDUCATION/TRAINING PROGRAM

## 2024-08-26 RX ORDER — OXYCODONE HYDROCHLORIDE 5 MG/1
5 TABLET ORAL
Status: COMPLETED | OUTPATIENT
Start: 2024-08-26 | End: 2024-08-26

## 2024-08-26 RX ORDER — LIDOCAINE 4 G/G
1 PATCH TOPICAL
Status: DISCONTINUED | OUTPATIENT
Start: 2024-08-26 | End: 2024-08-27 | Stop reason: HOSPADM

## 2024-08-26 RX ORDER — KETOROLAC TROMETHAMINE 10 MG/1
10 TABLET, FILM COATED ORAL EVERY 6 HOURS PRN
Qty: 15 TABLET | Refills: 0 | Status: SHIPPED | OUTPATIENT
Start: 2024-08-26

## 2024-08-26 RX ORDER — CYCLOBENZAPRINE HCL 10 MG
10 TABLET ORAL 3 TIMES DAILY PRN
Qty: 21 TABLET | Refills: 0 | Status: SHIPPED | OUTPATIENT
Start: 2024-08-26 | End: 2024-09-05

## 2024-08-26 RX ORDER — OXYCODONE AND ACETAMINOPHEN 5; 325 MG/1; MG/1
1 TABLET ORAL
Status: COMPLETED | OUTPATIENT
Start: 2024-08-26 | End: 2024-08-26

## 2024-08-26 RX ORDER — LIDOCAINE 50 MG/G
1 PATCH TOPICAL DAILY
Qty: 10 PATCH | Refills: 0 | Status: SHIPPED | OUTPATIENT
Start: 2024-08-26 | End: 2024-09-05

## 2024-08-26 RX ORDER — OXYCODONE AND ACETAMINOPHEN 5; 325 MG/1; MG/1
1 TABLET ORAL EVERY 6 HOURS PRN
Qty: 12 TABLET | Refills: 0 | Status: SHIPPED | OUTPATIENT
Start: 2024-08-26 | End: 2024-08-29

## 2024-08-26 RX ORDER — CYCLOBENZAPRINE HCL 10 MG
10 TABLET ORAL
Status: COMPLETED | OUTPATIENT
Start: 2024-08-26 | End: 2024-08-26

## 2024-08-26 RX ORDER — KETOROLAC TROMETHAMINE 15 MG/ML
15 INJECTION, SOLUTION INTRAMUSCULAR; INTRAVENOUS ONCE
Status: COMPLETED | OUTPATIENT
Start: 2024-08-26 | End: 2024-08-26

## 2024-08-26 RX ADMIN — KETOROLAC TROMETHAMINE 15 MG: 15 INJECTION, SOLUTION INTRAMUSCULAR; INTRAVENOUS at 18:05

## 2024-08-26 RX ADMIN — CYCLOBENZAPRINE 10 MG: 10 TABLET, FILM COATED ORAL at 18:05

## 2024-08-26 RX ADMIN — OXYCODONE HYDROCHLORIDE AND ACETAMINOPHEN 1 TABLET: 5; 325 TABLET ORAL at 19:15

## 2024-08-26 RX ADMIN — OXYCODONE HYDROCHLORIDE 5 MG: 5 TABLET ORAL at 21:04

## 2024-08-26 ASSESSMENT — PAIN SCALES - GENERAL
PAINLEVEL_OUTOF10: 10
PAINLEVEL_OUTOF10: 10
PAINLEVEL_OUTOF10: 9
PAINLEVEL_OUTOF10: 9
PAINLEVEL_OUTOF10: 10

## 2024-08-26 ASSESSMENT — PAIN DESCRIPTION - LOCATION
LOCATION: NECK;LEG
LOCATION: HIP;NECK
LOCATION: NECK;HIP

## 2024-08-26 ASSESSMENT — ENCOUNTER SYMPTOMS
COUGH: 0
SHORTNESS OF BREATH: 0
BACK PAIN: 0

## 2024-08-26 ASSESSMENT — PAIN DESCRIPTION - DESCRIPTORS
DESCRIPTORS: ACHING;SHARP;NUMBNESS
DESCRIPTORS: ACHING;SHARP;NUMBNESS
DESCRIPTORS: SHARP;NUMBNESS

## 2024-08-26 ASSESSMENT — LIFESTYLE VARIABLES
HOW MANY STANDARD DRINKS CONTAINING ALCOHOL DO YOU HAVE ON A TYPICAL DAY: PATIENT DOES NOT DRINK
HOW OFTEN DO YOU HAVE A DRINK CONTAINING ALCOHOL: NEVER

## 2024-08-26 ASSESSMENT — PAIN DESCRIPTION - ORIENTATION
ORIENTATION: LEFT;POSTERIOR
ORIENTATION: LEFT
ORIENTATION: LEFT

## 2024-08-26 ASSESSMENT — PAIN - FUNCTIONAL ASSESSMENT: PAIN_FUNCTIONAL_ASSESSMENT: 0-10

## 2024-08-26 NOTE — ED PROVIDER NOTES
[facility] EMERGENCY DEPT  EMERGENCY DEPARTMENT ENCOUNTER      Pt Name: Rosalia Downing  MRN: [id number]  Birthdate 1965  Date of evaluation: 8/26/2024  Provider: JESUS Amaral  12:40 AM    CHIEF COMPLAINT       Chief Complaint   Patient presents with    Neck Pain    Leg Pain         HISTORY OF PRESENT ILLNESS    Rosalia Downing is a 59 y.o. female who presents to the emergency department with left leg pain.      58 y/o f presents to emergency department with pain to neck and pain to her left hip that radiates to her toes. She reports that she noticed the pain Friday at work. 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 She denies any fever or chills. No headache. No fever or chills. She denies any known injury. She has been taking otc medication with no relief for pain.               Nursing Notes were reviewed.    REVIEW OF SYSTEMS       Review of Systems   Constitutional:  Negative for chills, fatigue and fever.   HENT:  Negative for congestion and ear pain.    Eyes:  Negative for visual disturbance.   Respiratory:  Negative for cough and shortness of breath.    Cardiovascular:  Negative for chest pain and palpitations.   Gastrointestinal: Negative.    Endocrine: Negative.    Genitourinary: Negative.  Negative for dysuria and flank pain.   Musculoskeletal:  Positive for myalgias and neck pain. Negative for back pain and gait problem.   Skin:  Negative for pallor and wound.   Neurological:  Negative for dizziness, tremors, seizures, syncope, speech difficulty, weakness, light-headedness, numbness and headaches.   Hematological: Negative.    Psychiatric/Behavioral: Negative.         Except as noted above the remainder of the review of systems was reviewed and negative.       PAST MEDICAL HISTORY     Past Medical History:   Diagnosis Date    Arthritis     Diabetes (HCC)     Hypertension     Morbid obesity (HCC)     BMI-50    Psychiatric disorder     CLAUSTROPHOBIA    Stroke (HCC) 2006    no residuals    Unspecified  Cervical back: Normal range of motion and neck supple. No rigidity or tenderness.      Right upper leg: Normal. No tenderness or bony tenderness.      Left upper leg: Normal. No tenderness or bony tenderness.      Right lower leg: Normal. No deformity, tenderness or bony tenderness. No edema.      Left lower leg: Normal. No deformity, tenderness or bony tenderness. No edema.      Right foot: Normal. Normal capillary refill. Normal pulse.      Left foot: Normal. Normal capillary refill. Normal pulse.   Lymphadenopathy:      Cervical: No cervical adenopathy.   Skin:     General: Skin is warm.      Capillary Refill: Capillary refill takes less than 2 seconds.      Coloration: Skin is not jaundiced.      Findings: No erythema.   Neurological:      General: No focal deficit present.      Mental Status: She is alert and oriented to person, place, and time. Mental status is at baseline.      Cranial Nerves: No cranial nerve deficit.      Sensory: No sensory deficit.      Motor: No weakness.      Coordination: Coordination normal.      Gait: Gait normal.   Psychiatric:         Mood and Affect: Mood normal.         Behavior: Behavior normal.         DIAGNOSTIC RESULTS         Interpretation per the Radiologist below, if available at the time of this note:    XR HIP 2-3 VW W PELVIS LEFT   Final Result   1.  No acute pathology in the pelvis or the left hip.   2.  Mild left hip osteoarthritis.      Electronically signed by Torrey Gates            ED BEDSIDE ULTRASOUND:   Performed by ED Physician - none    LABS:  Labs Reviewed - No data to display    All other labs were within normal range or not returned as of this dictation.    EMERGENCY DEPARTMENT COURSE and DIFFERENTIAL DIAGNOSIS/MDM:   Vitals:    Vitals:    08/26/24 1708   BP: (!) 131/53   Pulse: 94   Resp: 20   Temp: 97.9 °F (36.6 °C)   TempSrc: Oral   SpO2: 99%   Weight: 94.3 kg (208 lb)   Height: 1.6 m (5' 3\")           Medical Decision Making  Amount and/or Complexity

## 2024-08-26 NOTE — ED TRIAGE NOTES
Pt c/o pain from her neck all the way down her left leg to her toes. Started Friday at work. Pt thinks she has a pinched nerve

## 2024-08-27 NOTE — DISCHARGE INSTRUCTIONS
Call your PCP for follow-up in office this week.   Take medication as prescribed for pain.   Return to ED for worsening/ concerning symptoms.

## 2024-08-27 NOTE — ED PROVIDER NOTES
Dr. Lopez taking for patient care.  Patient initially seen for headache and left lower extremity pain.  On my evaluation patient ambulatory however pain reproduced with ambulation.  No tenderness palpation to the midline spine, left hip, or left lower extremity.  Neurovascularly intact.  No obvious abnormalities as etiology of her symptoms.  Believe patient likely suffering from a radiculopathy at this time.  X-ray of the left hip on my read did not show evidence of acute osseous abnormalities.  Ultimately patient will be discharged home with strict return precautions and follow-up recommendations with orthopedic surgery.  Low suspicion for cauda equina or spinal epidural abscess.  No history of IV drug use or immunosuppressive therapy.  Patient not appropriate for steroids as she has a history of poorly controlled diabetes.  Patient verbalized understanding and is without further questions.  Comfortable plan.     Ion Lopez Jr., DO  08/26/24 1459

## 2024-08-28 ASSESSMENT — ENCOUNTER SYMPTOMS: GASTROINTESTINAL NEGATIVE: 1

## 2024-09-01 ENCOUNTER — HOSPITAL ENCOUNTER (EMERGENCY)
Facility: HOSPITAL | Age: 59
Discharge: HOME | End: 2024-09-01
Attending: STUDENT IN AN ORGANIZED HEALTH CARE EDUCATION/TRAINING PROGRAM
Payer: COMMERCIAL

## 2024-09-01 VITALS
HEIGHT: 63 IN | TEMPERATURE: 97.9 F | SYSTOLIC BLOOD PRESSURE: 145 MMHG | HEART RATE: 97 BPM | BODY MASS INDEX: 36.86 KG/M2 | OXYGEN SATURATION: 100 % | WEIGHT: 208 LBS | DIASTOLIC BLOOD PRESSURE: 96 MMHG | RESPIRATION RATE: 16 BRPM

## 2024-09-01 DIAGNOSIS — M54.32 SCIATICA OF LEFT SIDE: Primary | ICD-10-CM

## 2024-09-01 PROCEDURE — 6370000000 HC RX 637 (ALT 250 FOR IP): Performed by: STUDENT IN AN ORGANIZED HEALTH CARE EDUCATION/TRAINING PROGRAM

## 2024-09-01 PROCEDURE — 96372 THER/PROPH/DIAG INJ SC/IM: CPT

## 2024-09-01 PROCEDURE — 6360000002 HC RX W HCPCS: Performed by: STUDENT IN AN ORGANIZED HEALTH CARE EDUCATION/TRAINING PROGRAM

## 2024-09-01 PROCEDURE — 99284 EMERGENCY DEPT VISIT MOD MDM: CPT

## 2024-09-01 RX ORDER — KETOROLAC TROMETHAMINE 15 MG/ML
30 INJECTION, SOLUTION INTRAMUSCULAR; INTRAVENOUS ONCE
Status: COMPLETED | OUTPATIENT
Start: 2024-09-01 | End: 2024-09-01

## 2024-09-01 RX ORDER — DIAZEPAM 5 MG
5 TABLET ORAL EVERY 6 HOURS PRN
Qty: 12 TABLET | Refills: 0 | Status: SHIPPED | OUTPATIENT
Start: 2024-09-01 | End: 2024-09-04

## 2024-09-01 RX ORDER — DIAZEPAM 5 MG
5 TABLET ORAL ONCE
Status: COMPLETED | OUTPATIENT
Start: 2024-09-01 | End: 2024-09-01

## 2024-09-01 RX ADMIN — DIAZEPAM 5 MG: 5 TABLET ORAL at 08:11

## 2024-09-01 RX ADMIN — KETOROLAC TROMETHAMINE 30 MG: 15 INJECTION, SOLUTION INTRAMUSCULAR; INTRAVENOUS at 08:11

## 2024-09-01 ASSESSMENT — PAIN DESCRIPTION - ORIENTATION
ORIENTATION: LEFT
ORIENTATION: LEFT

## 2024-09-01 ASSESSMENT — PAIN DESCRIPTION - LOCATION
LOCATION: LEG
LOCATION: LEG

## 2024-09-01 ASSESSMENT — PAIN SCALES - GENERAL
PAINLEVEL_OUTOF10: 10
PAINLEVEL_OUTOF10: 10

## 2024-09-01 ASSESSMENT — PAIN - FUNCTIONAL ASSESSMENT: PAIN_FUNCTIONAL_ASSESSMENT: 0-10

## 2024-09-01 ASSESSMENT — PAIN DESCRIPTION - DESCRIPTORS: DESCRIPTORS: ACHING;CRAMPING

## 2024-09-01 NOTE — ED NOTES
Pt ambulatory to F1, Respirations are even, equal and unlabored. No signs of cardiopulmonary distress noted. Gait steady     Pt Is A&Ox4, NAD voiced or noted.

## 2024-09-01 NOTE — ED TRIAGE NOTES
Pt presents to triage for L leg pain. Pt states she was seen at [facility] ED and diagnosed with a pinched nerve. Pt states the pain is not improvingwith oxycodone and muscle relaxer.

## 2024-09-01 NOTE — ED PROVIDER NOTES
EMERGENCY DEPARTMENT HISTORY AND PHYSICAL EXAM      Date: 9/1/2024  Patient Name: Rosalia Downing    History of Presenting Illness     Chief Complaint   Patient presents with    Leg Pain       History (Context): Rosalia Downing is a 59 y.o. female  presents to the ED today with chief complaint of left-sided hip and leg pain.  Patient states symptoms have been ongoing for approximately 1 week and have not worsened improved since onset.  Was seen in outside hospital and diagnosed with sciatica/radiculopathy.  States that she was provided multiple medication without significant improvement of her symptoms.  Patient denies any bowel or bladder incontinence or difficulty, IV drug usage, fever, chills, recent trauma or falls, or difficulty with ambulation.  States her symptoms are exacerbated with laying supine.  Denies any immunosuppressive therapy.      PCP: Fox Rolle MD    Current Facility-Administered Medications   Medication Dose Route Frequency Provider Last Rate Last Admin    diazePAM (VALIUM) tablet 5 mg  5 mg Oral Once Ion Lopez Jr., DO        ketorolac (TORADOL) injection 30 mg  30 mg IntraMUSCular Once Ion Lopez Jr., DO         Current Outpatient Medications   Medication Sig Dispense Refill    diazePAM (VALIUM) 5 MG tablet Take 1 tablet by mouth every 6 hours as needed for Anxiety for up to 3 days. Max Daily Amount: 20 mg 12 tablet 0    cyclobenzaprine (FLEXERIL) 10 MG tablet Take 1 tablet by mouth 3 times daily as needed for Muscle spasms 21 tablet 0    ketorolac (TORADOL) 10 MG tablet Take 1 tablet by mouth every 6 hours as needed for Pain 15 tablet 0    lidocaine (LIDODERM) 5 % Place 1 patch onto the skin daily for 10 days 12 hours on, 12 hours off. 10 patch 0    aspirin 81 MG chewable tablet Take 1 tablet by mouth daily      dilTIAZem HCl ER Coated Beads 240 MG TB24 Take 1 tablet by mouth 2 times daily (Patient not taking: Reported on 8/26/2024)      gabapentin (NEURONTIN) 300 MG

## 2024-10-24 ENCOUNTER — HOSPITAL ENCOUNTER (OUTPATIENT)
Facility: HOSPITAL | Age: 59
Setting detail: RECURRING SERIES
Discharge: HOME | End: 2024-10-27
Payer: COMMERCIAL

## 2024-10-24 PROCEDURE — 97110 THERAPEUTIC EXERCISES: CPT

## 2024-10-24 PROCEDURE — 97535 SELF CARE MNGMENT TRAINING: CPT

## 2024-10-24 PROCEDURE — 97162 PT EVAL MOD COMPLEX 30 MIN: CPT

## 2024-10-24 NOTE — PROGRESS NOTES
PT DAILY TREATMENT NOTE/LUMBAR EVAL     Patient Name: Rosalia Downing    Date: 10/24/2024    : 1965  Insurance: Payor: JOI / Plan: [facility] / Product Type: *No Product type* /      Patient  verified yes     Visit #   Current / Total 1 24   Time   In / Out 9:43 10:21   Pain   In / Out 8 7   Subjective Functional Status/Changes: See Subjective Summary       Treatment Area: Other low back pain [M54.59]  Spondylosis without myelopathy or radiculopathy, lumbar region [M47.816]  Spinal stenosis, lumbar region with neurogenic claudication [M48.062]  SUBJECTIVE    Subjective functional status/changes:     Chief Complaint: Low back and bilateral anterior leg pain   History/Mechanism of Injury: unknown - gradual onset  Current Symptoms/Deficits: ascending/descending stairs, pushing/pulling/lifting activities  Pain-  Current: 8/10     Worst: 9/10   Best: did not report/10  Previous Treatment/Compliance: none  Mobility Devices: SPC   PMHx/Surgical Hx: bilateral knee arthroscopy     Diagnostic Tests: [] Lab work [] X-rays    [] CT [x] MRI     [] Other:  Results: severe arthritis per pt report    Work Hx: CNA  Living Situation: 14 steps to upstairs on right side  Household Modifications: none  Hobbies: walk  PLOF: Independent  Pt Goals: \"get back to my normal self\", \"be able to walk 1 mile without pain\", \"be able to get to back to Taoism\"    General Health:  Red Flags Indicated? [] Yes    [x] No  [] Yes [x] No Recent weight change (If yes, due to dieting? [] Yes  [] No)   [] Yes [x] No Weakness in legs during walking  [] Yes [x] No Unremitting pain at night  [] Yes [x] No Abdominal pain or problems  [] Yes [x] No Rectal bleeding  [] Yes [x] No Feet more cold or painful in cold weather  [] Yes [x] No Menstrual irregularities  [] Yes [x] No Blood or pain with urination  [] Yes [x] No Dysfunction of bowel or bladder  [] Yes [x] No Recent illness within past 3 weeks (i.e, cold, flu)  [] Yes [x]

## 2024-10-24 NOTE — PROGRESS NOTES
[facility]  [address], [place] [address] Phone: [phone] Fax [phone]  Plan of Care / Statement of Necessity for Physical Therapy Services     Patient Name: Rosalia Downing : 1965   Treatment   Diagnosis: M25.551  RIGHT HIP PAIN, M25.552  LEFT HIP PAIN, M79.651  Pain in right thigh, and M79.652  Pain in left thigh  and M54.59  OTHER LOWER BACK PAIN Medical Diagnosis: Other low back pain [M54.59]  Spondylosis without myelopathy or radiculopathy, lumbar region [M47.816]  Spinal stenosis, lumbar region with neurogenic claudication [M48.062]   Onset Date: 10/11/2024 Payor Source: Payor: JOI / Plan: [facility] / Product Type: *No Product type* /    Referral Source: Frida Guerrero FNP Start of Care (SOC): 10/24/2024   Prior Hospitalization: See medical history Provider #: [id number]   Prior Level of Function: Independent    Comorbidities: Diabetes mellitus and Other: hx of high cholesterol, hx of HTN, hx of bilateral knee arthroscopy     Assessment / key information:  Pt is a pleasant 59 y.o. female who presents to PT with bilateral anterior thigh pain describes as burning all the way into bilateral feet, and low back pain with gradual onset. Pt demonstrates decreased strength in bilateral hip extension, and left hip internal rotation - more deficits seen with left LE. Pt also demonstrates decreased ROM with lumbar extension and bilateral rotation. Pt's impairments have limited their ability to perform work responsibilities, ascend/descend standard steps, perform pushing/pulling/lifting activities, and perform ADL due to pain and burning sensation. Pt would benefit from skilled PT to address above deficits in order to improve their ability to perfrom pain-free unrestricted ADLs/IADLs in order to enhance the Pt's overall quality of life.      Evaluation Complexity:  History:  HIGH Complexity :3+ comorbidities /

## 2024-10-29 ENCOUNTER — HOSPITAL ENCOUNTER (OUTPATIENT)
Facility: HOSPITAL | Age: 59
Setting detail: RECURRING SERIES
Discharge: HOME | End: 2024-11-01
Payer: COMMERCIAL

## 2024-10-29 PROCEDURE — 97112 NEUROMUSCULAR REEDUCATION: CPT

## 2024-10-29 PROCEDURE — 97530 THERAPEUTIC ACTIVITIES: CPT

## 2024-10-29 PROCEDURE — 97140 MANUAL THERAPY 1/> REGIONS: CPT

## 2024-10-29 NOTE — PROGRESS NOTES
PHYSICAL / OCCUPATIONAL THERAPY - DAILY TREATMENT NOTE    Patient Name: Rosalia Downing    Date: 10/29/2024    : 1965  Insurance: Payor: [employer] / Plan: [facility] / Product Type: *No Product type* /      Patient  verified Yes     Visit #   Current / Total 2 18   Time   In / Out 5:23 6:10   Pain   In / Out 10 5   Subjective Functional Status/Changes: Pt reports severe pain increase with HEP performance.      TREATMENT AREA =  Other low back pain [M54.59]  Spondylosis without myelopathy or radiculopathy, lumbar region [M47.816]  Spinal stenosis, lumbar region with neurogenic claudication [M48.062]     OBJECTIVE         Therapeutic Procedures:    Tx Min Billable or 1:1 Min (if diff from Tx Min) Procedure, Rationale, Specifics   10 10 [id number] Manual Therapy (timed):  decrease pain, increase ROM, increase tissue extensibility, and asses.adjust pelvic position to improve patient's ability to progress to PLOF and address remaining functional goals.  The manual therapy interventions were performed at a separate and distinct time from the therapeutic activities interventions . (see flow sheet as applicable)     Details if applicable:  Supine: pelvic shotgun MET x1, left upslip MET x1, left posterior/right anterior rotation MET; Left side lying: sacral torsion MET     19 17 [id number] Neuromuscular Re-Education (timed):  improve balance, coordination, kinesthetic sense, posture, core stability and proprioception to improve patient's ability to develop conscious control of individual muscles and awareness of position of extremities in order to progress to PLOF and address remaining functional goals. (see flow sheet as applicable)     Details if applicable:      73011 Therapeutic Activity (timed):  use of dynamic activities replicating functional movements to increase ROM, strength, coordination, balance, and proprioception in order to improve patient's ability to progress to PLOF and address remaining functional

## 2024-10-31 ENCOUNTER — HOSPITAL ENCOUNTER (OUTPATIENT)
Facility: HOSPITAL | Age: 59
Setting detail: RECURRING SERIES
Discharge: HOME | End: 2024-11-03
Payer: COMMERCIAL

## 2024-10-31 PROCEDURE — 97530 THERAPEUTIC ACTIVITIES: CPT

## 2024-10-31 PROCEDURE — 97112 NEUROMUSCULAR REEDUCATION: CPT

## 2024-10-31 NOTE — PROGRESS NOTES
PHYSICAL / OCCUPATIONAL THERAPY - DAILY TREATMENT NOTE    Patient Name: Rosalia Downing    Date: 10/31/2024    : 1965  Insurance: Payor: JOI / Plan: [facility] / Product Type: *No Product type* /      Patient  verified Yes     Visit #   Current / Total 3 18   Time   In / Out 9:40 am 10:25 am   Pain   In / Out 5/10 5/10   Subjective Functional Status/Changes: \"I feel better today.\"      TREATMENT AREA =  Other low back pain [M54.59]  Spondylosis without myelopathy or radiculopathy, lumbar region [M47.816]  Spinal stenosis, lumbar region with neurogenic claudication [M48.062]     OBJECTIVE         Therapeutic Procedures:    Tx Min Billable or 1:1 Min (if diff from Tx Min) Procedure, Rationale, Specifics   30 30 63540 Neuromuscular Re-Education (timed):  improve balance, coordination, kinesthetic sense, posture, core stability and proprioception to improve patient's ability to develop conscious control of individual muscles and awareness of position of extremities in order to progress to PLOF and address remaining functional goals. (see flow sheet as applicable)     Details if applicable:     15 15 33174 Therapeutic Activity (timed):  use of dynamic activities replicating functional movements to increase ROM, strength, coordination, balance, and proprioception in order to improve patient's ability to progress to PLOF and address remaining functional goals.  (see flow sheet as applicable)     Details if applicable:  including HEP update          Details if applicable:            Details if applicable:            Details if applicable:     45 45 [facility] Totals Reminder: bill using total billable min of TIMED therapeutic procedures (example: do not include dry needle or estim unattended, both untimed codes, in totals to left)  8-22 min = 1 unit; 23-37 min = 2 units; 38-52 min = 3 units; 53-67 min = 4 units; 68-82 min = 5 units   Total Total     [x]  Patient Education billed concurrently with other  Flexor Endurance to >30 seconds to demonstrate improved core activation required for ADL/work responsibilities.   Status at last note/certification: 6.24 seconds - unable to to lift legs with task  Current:  3. Pt to report < 4/10 pain at worst to increase ease with iADLs, job duties.   Status at last note/certification: 9/10 at worst  Current:  4.  Pt will report >50% improvement in burning sensation/nerve symptoms in bilateral anterior thighs to demonstrate improved quality of life.   Status at last note/certification: pt reports consistent bilateral anterior thigh pain described as burning   Current:  5. Pt will improve Oswestry score to <40% to demonstrate improved mobility with ADL/work responsibilities and increased quality of life.  Status at last note/certification: Oswestry = 70%  Current:    Next PN/ RC due 11/22/24  Auth due (visit number/ date) 20 visits per calendar year    PLAN  - Continue Plan of Care  - Upgrade activities as tolerated    Fany Cardona, PT    10/31/2024    10:04 AM  If an interpreting service was utilized for treatment of this patient, the contents of this document represent the material reviewed with the patient via the .     Future Appointments   Date Time Provider Department Center   11/5/2024  9:00 AM [facility]   11/7/2024 10:20 AM [facility]   11/12/2024 10:20 AM [facility]   11/14/2024 10:20 AM [facility]   11/19/2024 10:20 AM [facility]   11/21/2024 10:20 AM [facility]   11/26/2024 10:20 AM [facility]   12/3/2024 10:20 AM [facility]   12/5/2024 10:20 AM [facility]

## 2024-11-05 ENCOUNTER — HOSPITAL ENCOUNTER (OUTPATIENT)
Facility: HOSPITAL | Age: 59
Setting detail: RECURRING SERIES
Discharge: HOME | End: 2024-11-08
Payer: COMMERCIAL

## 2024-11-05 PROCEDURE — 97110 THERAPEUTIC EXERCISES: CPT

## 2024-11-05 PROCEDURE — 97530 THERAPEUTIC ACTIVITIES: CPT

## 2024-11-05 PROCEDURE — 97112 NEUROMUSCULAR REEDUCATION: CPT

## 2024-11-05 NOTE — PROGRESS NOTES
PHYSICAL / OCCUPATIONAL THERAPY - DAILY TREATMENT NOTE    Patient Name: Rosalia Downing    Date: 2024    : 1965  Insurance: Payor: JOI / Plan: [facility] / Product Type: *No Product type* /      Patient  verified Yes     Visit #   Current / Total 4 18   Time   In / Out 9:00 9:48   Pain   In / Out 7 6   Subjective Functional Status/Changes: \"I have a lot of pain in my right leg today\"     TREATMENT AREA =  Other low back pain [M54.59]  Spondylosis without myelopathy or radiculopathy, lumbar region [M47.816]  Spinal stenosis, lumbar region with neurogenic claudication [M48.062]     OBJECTIVE    Modalities Rationale:     decrease pain to improve patient's ability to progress to PLOF and address remaining functional goals.     min [] Estim Unattended, type/location:                                      []  w/ice    []  w/heat    min [] Estim Attended, type/location:                                     []  w/US     []  w/ice    []  w/heat    []  TENS insruct      min []  Mechanical Traction: type/lbs                   []  pro   []  sup   []  int   []  cont    []  before manual    []  after manual    min []  Ultrasound, settings/location:     10 min  unbill [x]  Ice     []  Heat    location/position: Reclined/right hip     min []  Paraffin,  details:     min []  Vasopneumatic Device, press/temp:     min []  Whirlpool / Fluido:    If using vaso (only need to measure limb vaso being performed on)      pre-treatment girth :       post-treatment girth :       measured at (landmark location) :      min []  Other:    Skin assessment post-treatment:   Intact      Therapeutic Procedures:    Tx Min Billable or 1:1 Min (if diff from Tx Min) Procedure, Rationale, Specifics   16 16 [id number] Therapeutic Exercise (timed):  increase ROM, strength, coordination, balance, and proprioception to improve patient's ability to progress to PLOF and address remaining functional goals. (see flow sheet as applicable)     Details

## 2024-11-07 ENCOUNTER — HOSPITAL ENCOUNTER (OUTPATIENT)
Facility: HOSPITAL | Age: 59
Setting detail: RECURRING SERIES
Discharge: HOME | End: 2024-11-10
Payer: COMMERCIAL

## 2024-11-07 PROCEDURE — 97112 NEUROMUSCULAR REEDUCATION: CPT

## 2024-11-07 PROCEDURE — 97110 THERAPEUTIC EXERCISES: CPT

## 2024-11-07 NOTE — PROGRESS NOTES
PHYSICAL / OCCUPATIONAL THERAPY - DAILY TREATMENT NOTE    Patient Name: Rosalia Downing    Date: 2024    : 1965  Insurance: Payor: JOI / Plan: [facility] / Product Type: *No Product type* /      Patient  verified Yes     Visit #   Current / Total 5 18   Time   In / Out 10:20 11:10   Pain   In / Out 7 6   Subjective Functional Status/Changes: \"My pain is waking me up during the night\"     TREATMENT AREA =  Other low back pain [M54.59]  Spondylosis without myelopathy or radiculopathy, lumbar region [M47.816]  Spinal stenosis, lumbar region with neurogenic claudication [M48.062]     OBJECTIVE    Modalities Rationale:     decrease pain and increase tissue extensibility to improve patient's ability to progress to PLOF and address remaining functional goals.     min [] Estim Unattended, type/location:                                      []  w/ice    []  w/heat    min [] Estim Attended, type/location:                                     []  w/US     []  w/ice    []  w/heat    []  TENS insruct      min []  Mechanical Traction: type/lbs                   []  pro   []  sup   []  int   []  cont    []  before manual    []  after manual    min []  Ultrasound, settings/location:     10 min  unbill []  Ice     [x]  Heat    location/position: Reclined/anterior thigh    min []  Paraffin,  details:     min []  Vasopneumatic Device, press/temp:     min []  Whirlpool / Fluido:    If using vaso (only need to measure limb vaso being performed on)      pre-treatment girth :       post-treatment girth :       measured at (landmark location) :      min []  Other:    Skin assessment post-treatment:   Intact      Therapeutic Procedures:    Tx Min Billable or 1:1 Min (if diff from Tx Min) Procedure, Rationale, Specifics   24 39 [id number] Therapeutic Exercise (timed):  increase ROM, strength, coordination, balance, and proprioception to improve patient's ability to progress to PLOF and address remaining functional goals. (see

## 2024-11-12 ENCOUNTER — HOSPITAL ENCOUNTER (OUTPATIENT)
Facility: HOSPITAL | Age: 59
Setting detail: RECURRING SERIES
Discharge: HOME | End: 2024-11-15
Payer: COMMERCIAL

## 2024-11-12 PROCEDURE — 99285 EMERGENCY DEPT VISIT HI MDM: CPT

## 2024-11-12 PROCEDURE — 97530 THERAPEUTIC ACTIVITIES: CPT

## 2024-11-12 PROCEDURE — 97110 THERAPEUTIC EXERCISES: CPT

## 2024-11-12 NOTE — PROGRESS NOTES
PHYSICAL / OCCUPATIONAL THERAPY - DAILY TREATMENT NOTE    Patient Name: Rosalia Downing    Date: 2024    : 1965  Insurance: Payor: JOI / Plan: [facility] / Product Type: *No Product type* /      Patient  verified Yes     Visit #   Current / Total 6 18   Time   In / Out 10:23 11:14   Pain   In / Out 5 3   Subjective Functional Status/Changes: \"I just feel real achy in my legs today\"     TREATMENT AREA =  Other low back pain [M54.59]  Spondylosis without myelopathy or radiculopathy, lumbar region [M47.816]  Spinal stenosis, lumbar region with neurogenic claudication [M48.062]     OBJECTIVE    Modalities Rationale:     decrease pain and increase tissue extensibility to improve patient's ability to progress to PLOF and address remaining functional goals.     min [] Estim Unattended, type/location:                                      []  w/ice    []  w/heat    min [] Estim Attended, type/location:                                     []  w/US     []  w/ice    []  w/heat    []  TENS insruct      min []  Mechanical Traction: type/lbs                   []  pro   []  sup   []  int   []  cont    []  before manual    []  after manual    min []  Ultrasound, settings/location:     10 min  unbill []  Ice     [x]  Heat    location/position: Reclined/anterior thighs    min []  Paraffin,  details:     min []  Vasopneumatic Device, press/temp:     min []  Whirlpool / Fluido:    If using vaso (only need to measure limb vaso being performed on)      pre-treatment girth :       post-treatment girth :       measured at (landmark location) :      min []  Other:    Skin assessment post-treatment:   Intact      Therapeutic Procedures:    Tx Min Billable or 1:1 Min (if diff from Tx Min) Procedure, Rationale, Specifics   08 [phone] [id number] Therapeutic Exercise (timed):  increase ROM, strength, coordination, balance, and proprioception to improve patient's ability to progress to PLOF and address remaining functional goals. (see

## 2024-11-13 ENCOUNTER — HOSPITAL ENCOUNTER (EMERGENCY)
Facility: HOSPITAL | Age: 59
Discharge: HOME | End: 2024-11-13
Attending: EMERGENCY MEDICINE
Payer: COMMERCIAL

## 2024-11-13 ENCOUNTER — APPOINTMENT (OUTPATIENT)
Facility: HOSPITAL | Age: 59
End: 2024-11-13
Payer: COMMERCIAL

## 2024-11-13 VITALS
RESPIRATION RATE: 20 BRPM | WEIGHT: 226 LBS | HEART RATE: 105 BPM | BODY MASS INDEX: 40.04 KG/M2 | OXYGEN SATURATION: 99 % | DIASTOLIC BLOOD PRESSURE: 117 MMHG | SYSTOLIC BLOOD PRESSURE: 141 MMHG | HEIGHT: 63 IN | TEMPERATURE: 98 F

## 2024-11-13 DIAGNOSIS — Z79.4 TYPE 2 DIABETES MELLITUS WITH HYPERGLYCEMIA, WITH LONG-TERM CURRENT USE OF INSULIN (HCC): ICD-10-CM

## 2024-11-13 DIAGNOSIS — N30.00 ACUTE CYSTITIS WITHOUT HEMATURIA: Primary | ICD-10-CM

## 2024-11-13 DIAGNOSIS — M54.41 RIGHT-SIDED LOW BACK PAIN WITH RIGHT-SIDED SCIATICA, UNSPECIFIED CHRONICITY: ICD-10-CM

## 2024-11-13 DIAGNOSIS — E11.65 TYPE 2 DIABETES MELLITUS WITH HYPERGLYCEMIA, WITH LONG-TERM CURRENT USE OF INSULIN (HCC): ICD-10-CM

## 2024-11-13 DIAGNOSIS — R10.31 ABDOMINAL PAIN, RIGHT LOWER QUADRANT: ICD-10-CM

## 2024-11-13 LAB
ALBUMIN SERPL-MCNC: 4.1 G/DL (ref 3.4–5)
ALBUMIN/GLOB SERPL: 1 (ref 0.8–1.7)
ALP SERPL-CCNC: 86 U/L (ref 45–117)
ALT SERPL-CCNC: 18 U/L (ref 13–56)
ANION GAP SERPL CALC-SCNC: 9 MMOL/L (ref 3–18)
APPEARANCE UR: CLEAR
AST SERPL-CCNC: 13 U/L (ref 10–38)
BACTERIA URNS QL MICRO: ABNORMAL /HPF
BASOPHILS # BLD: 0.1 K/UL (ref 0–0.1)
BASOPHILS NFR BLD: 1 % (ref 0–2)
BILIRUB SERPL-MCNC: 0.6 MG/DL (ref 0.2–1)
BILIRUB UR QL: NEGATIVE
BUN SERPL-MCNC: 17 MG/DL (ref 7–18)
BUN/CREAT SERPL: 18 (ref 12–20)
CALCIUM SERPL-MCNC: 10.1 MG/DL (ref 8.5–10.1)
CHLORIDE SERPL-SCNC: 98 MMOL/L (ref 100–111)
CO2 SERPL-SCNC: 26 MMOL/L (ref 21–32)
COLOR UR: YELLOW
CREAT SERPL-MCNC: 0.92 MG/DL (ref 0.6–1.3)
DIFFERENTIAL METHOD BLD: NORMAL
EOSINOPHIL # BLD: 0.1 K/UL (ref 0–0.4)
EOSINOPHIL NFR BLD: 1 % (ref 0–5)
EPITH CASTS URNS QL MICRO: ABNORMAL /LPF (ref 0–5)
ERYTHROCYTE [DISTWIDTH] IN BLOOD BY AUTOMATED COUNT: 13.1 % (ref 11.6–14.5)
GLOBULIN SER CALC-MCNC: 4 G/DL (ref 2–4)
GLUCOSE BLD STRIP.AUTO-MCNC: 236 MG/DL (ref 70–110)
GLUCOSE SERPL-MCNC: 397 MG/DL (ref 74–99)
GLUCOSE UR STRIP.AUTO-MCNC: >1000 MG/DL
HCT VFR BLD AUTO: 36.3 % (ref 35–45)
HGB BLD-MCNC: 12.5 G/DL (ref 12–16)
HGB UR QL STRIP: NEGATIVE
IMM GRANULOCYTES # BLD AUTO: 0 K/UL (ref 0–0.04)
IMM GRANULOCYTES NFR BLD AUTO: 0 % (ref 0–0.5)
KETONES UR QL STRIP.AUTO: 15 MG/DL
LEUKOCYTE ESTERASE UR QL STRIP.AUTO: ABNORMAL
LYMPHOCYTES # BLD: 3.3 K/UL (ref 0.9–3.6)
LYMPHOCYTES NFR BLD: 36 % (ref 21–52)
MCH RBC QN AUTO: 27.3 PG (ref 24–34)
MCHC RBC AUTO-ENTMCNC: 34.4 G/DL (ref 31–37)
MCV RBC AUTO: 79.3 FL (ref 78–100)
MONOCYTES # BLD: 0.7 K/UL (ref 0.05–1.2)
MONOCYTES NFR BLD: 7 % (ref 3–10)
MUCOUS THREADS URNS QL MICRO: NEGATIVE /LPF
NEUTS SEG # BLD: 5 K/UL (ref 1.8–8)
NEUTS SEG NFR BLD: 55 % (ref 40–73)
NITRITE UR QL STRIP.AUTO: NEGATIVE
NRBC # BLD: 0 K/UL (ref 0–0.01)
NRBC BLD-RTO: 0 PER 100 WBC
PH UR STRIP: 6 (ref 5–8)
PLATELET # BLD AUTO: 402 K/UL (ref 135–420)
PMV BLD AUTO: 10.6 FL (ref 9.2–11.8)
POTASSIUM SERPL-SCNC: 3.5 MMOL/L (ref 3.5–5.5)
PROT SERPL-MCNC: 8.1 G/DL (ref 6.4–8.2)
PROT UR STRIP-MCNC: 30 MG/DL
RBC # BLD AUTO: 4.58 M/UL (ref 4.2–5.3)
RBC #/AREA URNS HPF: NEGATIVE /HPF (ref 0–5)
SODIUM SERPL-SCNC: 133 MMOL/L (ref 136–145)
SP GR UR REFRACTOMETRY: >1.03 (ref 1–1.04)
UROBILINOGEN UR QL STRIP.AUTO: 0.2 EU/DL (ref 0.2–1)
WBC # BLD AUTO: 9.1 K/UL (ref 4.6–13.2)
WBC URNS QL MICRO: ABNORMAL /HPF (ref 0–5)

## 2024-11-13 PROCEDURE — 6360000002 HC RX W HCPCS: Performed by: PHYSICIAN ASSISTANT

## 2024-11-13 PROCEDURE — 6370000000 HC RX 637 (ALT 250 FOR IP): Performed by: PHYSICIAN ASSISTANT

## 2024-11-13 PROCEDURE — 96361 HYDRATE IV INFUSION ADD-ON: CPT

## 2024-11-13 PROCEDURE — 82962 GLUCOSE BLOOD TEST: CPT

## 2024-11-13 PROCEDURE — 87088 URINE BACTERIA CULTURE: CPT

## 2024-11-13 PROCEDURE — 87186 SC STD MICRODIL/AGAR DIL: CPT

## 2024-11-13 PROCEDURE — 85025 COMPLETE CBC W/AUTO DIFF WBC: CPT

## 2024-11-13 PROCEDURE — 81001 URINALYSIS AUTO W/SCOPE: CPT

## 2024-11-13 PROCEDURE — 2580000003 HC RX 258: Performed by: PHYSICIAN ASSISTANT

## 2024-11-13 PROCEDURE — 96375 TX/PRO/DX INJ NEW DRUG ADDON: CPT

## 2024-11-13 PROCEDURE — 96374 THER/PROPH/DIAG INJ IV PUSH: CPT

## 2024-11-13 PROCEDURE — 80053 COMPREHEN METABOLIC PANEL: CPT

## 2024-11-13 PROCEDURE — 74177 CT ABD & PELVIS W/CONTRAST: CPT

## 2024-11-13 PROCEDURE — 6360000004 HC RX CONTRAST MEDICATION: Performed by: PHYSICIAN ASSISTANT

## 2024-11-13 PROCEDURE — 87086 URINE CULTURE/COLONY COUNT: CPT

## 2024-11-13 RX ORDER — 0.9 % SODIUM CHLORIDE 0.9 %
1000 INTRAVENOUS SOLUTION INTRAVENOUS ONCE
Status: COMPLETED | OUTPATIENT
Start: 2024-11-13 | End: 2024-11-13

## 2024-11-13 RX ORDER — METHOCARBAMOL 750 MG/1
750 TABLET, FILM COATED ORAL EVERY 6 HOURS PRN
Qty: 20 TABLET | Refills: 0 | Status: SHIPPED | OUTPATIENT
Start: 2024-11-13

## 2024-11-13 RX ORDER — IOPAMIDOL 612 MG/ML
85 INJECTION, SOLUTION INTRAVASCULAR
Status: COMPLETED | OUTPATIENT
Start: 2024-11-13 | End: 2024-11-13

## 2024-11-13 RX ORDER — KETOROLAC TROMETHAMINE 10 MG/1
10 TABLET, FILM COATED ORAL EVERY 6 HOURS PRN
Qty: 20 TABLET | Refills: 0 | Status: SHIPPED | OUTPATIENT
Start: 2024-11-13

## 2024-11-13 RX ORDER — CEPHALEXIN 500 MG/1
500 CAPSULE ORAL 3 TIMES DAILY
Qty: 21 CAPSULE | Refills: 0 | Status: SHIPPED | OUTPATIENT
Start: 2024-11-13 | End: 2024-11-20

## 2024-11-13 RX ORDER — MORPHINE SULFATE 4 MG/ML
4 INJECTION, SOLUTION INTRAMUSCULAR; INTRAVENOUS
Status: COMPLETED | OUTPATIENT
Start: 2024-11-13 | End: 2024-11-13

## 2024-11-13 RX ORDER — KETOROLAC TROMETHAMINE 15 MG/ML
15 INJECTION, SOLUTION INTRAMUSCULAR; INTRAVENOUS
Status: COMPLETED | OUTPATIENT
Start: 2024-11-13 | End: 2024-11-13

## 2024-11-13 RX ADMIN — INSULIN HUMAN 10 UNITS: 100 INJECTION, SOLUTION PARENTERAL at 02:34

## 2024-11-13 RX ADMIN — WATER 1000 MG: 1 INJECTION INTRAMUSCULAR; INTRAVENOUS; SUBCUTANEOUS at 02:34

## 2024-11-13 RX ADMIN — KETOROLAC TROMETHAMINE 15 MG: 15 INJECTION, SOLUTION INTRAMUSCULAR; INTRAVENOUS at 01:49

## 2024-11-13 RX ADMIN — MORPHINE SULFATE 4 MG: 4 INJECTION, SOLUTION INTRAMUSCULAR; INTRAVENOUS at 01:49

## 2024-11-13 RX ADMIN — IOPAMIDOL 85 ML: 612 INJECTION, SOLUTION INTRAVENOUS at 05:33

## 2024-11-13 RX ADMIN — SODIUM CHLORIDE 1000 ML: 9 INJECTION, SOLUTION INTRAVENOUS at 02:39

## 2024-11-13 ASSESSMENT — ENCOUNTER SYMPTOMS
RHINORRHEA: 0
BACK PAIN: 1
EYE REDNESS: 0
STRIDOR: 0
COUGH: 0
SORE THROAT: 0
WHEEZING: 0
SHORTNESS OF BREATH: 0
EYE DISCHARGE: 0
ABDOMINAL PAIN: 0
NAUSEA: 0
VOMITING: 0

## 2024-11-13 ASSESSMENT — PAIN DESCRIPTION - DESCRIPTORS: DESCRIPTORS: SHOOTING

## 2024-11-13 ASSESSMENT — PAIN DESCRIPTION - ORIENTATION
ORIENTATION: RIGHT;LOWER
ORIENTATION: LOWER

## 2024-11-13 ASSESSMENT — PAIN SCALES - GENERAL
PAINLEVEL_OUTOF10: 10
PAINLEVEL_OUTOF10: 10

## 2024-11-13 ASSESSMENT — PAIN DESCRIPTION - LOCATION
LOCATION: ABDOMEN
LOCATION: BACK

## 2024-11-13 NOTE — ED TRIAGE NOTES
Pt does PT Tuesdays and Thursdays for arthritis in back. Today after PT pt having increase in back pain. A&Ox4.

## 2024-11-13 NOTE — ED PROVIDER NOTES
EMERGENCY DEPARTMENT HISTORY AND PHYSICAL EXAM    Date: 11/13/2024  Patient Name: Rosalia Downing    History of Presenting Illness     Chief Complaint   Patient presents with    Back Pain         History Provided By: patient     Chief Complaint: back pain   Duration: few days  Timing:  acute on chronic   Location: R low back down the R leg   Quality: shooting   Severity: moderate to severe  Modifying Factors: none   Associated Symptoms: RLQ abd pain       Additional History (Context): Rosalia Downing is a 59 y.o. female with PMH DM arthritis, htn stroke arthritis and obesity who presents with c/o worsening back pain radiating down the R leg for the past few days.  Patient also reports some associated right lower quadrant abdominal pain.  She does report starting physical therapy recently for some chronic back pain.  She is unsure if this worsened her symptoms.  Denies fever and chills.  Denies urinary and bowel incontinence.  No other complaints reported at this time    PCP: Fox Rolle MD    Current Facility-Administered Medications   Medication Dose Route Frequency Provider Last Rate Last Admin    sodium chloride 0.9 % bolus 1,000 mL  1,000 mL IntraVENous Once Ronda Benavides PA-C        insulin regular (HumuLIN R;NovoLIN R) injection 10 Units  10 Units SubCUTAneous NOW Ronda Benavides PA-C        cefTRIAXone (ROCEPHIN) 1,000 mg in sterile water 10 mL IV syringe  1,000 mg IntraVENous NOW Ronda Benavides PA-C         Current Outpatient Medications   Medication Sig Dispense Refill    cephALEXin (KEFLEX) 500 MG capsule Take 1 capsule by mouth 3 times daily for 7 days 21 capsule 0    methocarbamol (ROBAXIN-750) 750 MG tablet Take 1 tablet by mouth every 6 hours as needed (back pain) 20 tablet 0    ketorolac (TORADOL) 10 MG tablet Take 1 tablet by mouth every 6 hours as needed for Pain 20 tablet 0    naproxen (NAPROSYN) 375 MG tablet Take 1 tablet by mouth 2 times daily (with meals) 60 tablet 3

## 2024-11-13 NOTE — ED NOTES
6:30 AM:Pt care assumed from Dr. Aguilar , ED provider. Pt complaint(s), current treatment plan, progression and available diagnostic results have been discussed thoroughly. The patient was seen and evaluated on my shift.   Rounding occurred: Yes  Intended Disposition: Discharge  Pending diagnostic reports and/or labs (please list): CT abd/pelvis    Labs Reviewed   COMPREHENSIVE METABOLIC PANEL - Abnormal; Notable for the following components:       Result Value    Sodium 133 (*)     Chloride 98 (*)     Glucose 397 (*)     All other components within normal limits   URINALYSIS - Abnormal; Notable for the following components:    Protein, UA 30 (*)     Glucose, Ur >1000 (*)     Ketones, Urine 15 (*)     Leukocyte Esterase, Urine SMALL (*)     All other components within normal limits   URINALYSIS, MICRO - Abnormal; Notable for the following components:    BACTERIA, URINE FEW (*)     All other components within normal limits   POCT GLUCOSE - Abnormal; Notable for the following components:    POC Glucose 236 (*)     All other components within normal limits   CULTURE, URINE   CBC WITH AUTO DIFFERENTIAL       8:13 AM    CT ABDOMEN PELVIS W IV CONTRAST Additional Contrast? None   Final Result   1.  No acute pathology appreciated in the abdomen or pelvis.      Electronically signed by Torrey WHITE Post        8:13 AM Upon re-evaluation the patient's symptoms have improved. Pt has non-toxic appearance and condition is stable for discharge. She was informed of her results, instructed to f/u with her PCP and return to the ED upon worsening of symptoms. All questions and concerns were addressed.    Diagnosis:   1. Acute cystitis without hematuria    2. Right-sided low back pain with right-sided sciatica, unspecified chronicity    3. Abdominal pain, right lower quadrant    4. Type 2 diabetes mellitus with hyperglycemia, with long-term current use of insulin (HCC)          Disposition:    DISPOSITION Decision To Discharge 11/13/2024 
Total Protein 8.1 6.4 - 8.2 g/dL    Albumin 4.1 3.4 - 5.0 g/dL    Globulin 4.0 2.0 - 4.0 g/dL    Albumin/Globulin Ratio 1.0 0.8 - 1.7     Urinalysis    Collection Time: 11/13/24  1:27 AM   Result Value Ref Range    Color, UA YELLOW      Appearance CLEAR      Specific Gravity, UA >1.030 1.003 - 1.040    pH, Urine 6.0 5.0 - 8.0      Protein, UA 30 (A) NEG mg/dL    Glucose, Ur >1000 (A) NEG mg/dL    Ketones, Urine 15 (A) NEG mg/dL    Bilirubin, Urine Negative NEG      Blood, Urine Negative NEG      Urobilinogen, Urine 0.2 0.2 - 1.0 EU/dL    Nitrite, Urine Negative NEG      Leukocyte Esterase, Urine SMALL (A) NEG     Urinalysis, Micro    Collection Time: 11/13/24  1:27 AM   Result Value Ref Range    WBC, UA 4-10 0 - 5 /hpf    RBC, UA Negative 0 - 5 /hpf    Epithelial Cells, UA FEW 0 - 5 /lpf    BACTERIA, URINE FEW (A) NEG /hpf    Mucus, UA Negative NEG /lpf      CT ABDOMEN PELVIS W IV CONTRAST Additional Contrast? None    (Results Pending)

## 2024-11-14 ENCOUNTER — APPOINTMENT (OUTPATIENT)
Facility: HOSPITAL | Age: 59
End: 2024-11-14
Payer: COMMERCIAL

## 2024-11-15 LAB
BACTERIA SPEC CULT: ABNORMAL
BACTERIA SPEC CULT: ABNORMAL
CC UR VC: ABNORMAL
SERVICE CMNT-IMP: ABNORMAL

## 2024-11-19 ENCOUNTER — APPOINTMENT (OUTPATIENT)
Facility: HOSPITAL | Age: 59
End: 2024-11-19
Payer: COMMERCIAL

## 2024-11-21 ENCOUNTER — APPOINTMENT (OUTPATIENT)
Facility: HOSPITAL | Age: 59
End: 2024-11-21
Payer: COMMERCIAL

## 2024-11-26 ENCOUNTER — APPOINTMENT (OUTPATIENT)
Facility: HOSPITAL | Age: 59
End: 2024-11-26
Payer: COMMERCIAL

## 2025-01-19 ENCOUNTER — HOSPITAL ENCOUNTER (EMERGENCY)
Facility: HOSPITAL | Age: 60
Discharge: HOME | End: 2025-01-19
Attending: EMERGENCY MEDICINE
Payer: COMMERCIAL

## 2025-01-19 VITALS
BODY MASS INDEX: 35.26 KG/M2 | OXYGEN SATURATION: 99 % | SYSTOLIC BLOOD PRESSURE: 189 MMHG | WEIGHT: 199 LBS | HEART RATE: 86 BPM | TEMPERATURE: 98.1 F | HEIGHT: 63 IN | RESPIRATION RATE: 20 BRPM | DIASTOLIC BLOOD PRESSURE: 88 MMHG

## 2025-01-19 DIAGNOSIS — M54.31 SCIATICA OF RIGHT SIDE: Primary | ICD-10-CM

## 2025-01-19 PROCEDURE — 6370000000 HC RX 637 (ALT 250 FOR IP): Performed by: EMERGENCY MEDICINE

## 2025-01-19 PROCEDURE — 99284 EMERGENCY DEPT VISIT MOD MDM: CPT

## 2025-01-19 PROCEDURE — 96372 THER/PROPH/DIAG INJ SC/IM: CPT

## 2025-01-19 PROCEDURE — 6360000002 HC RX W HCPCS: Performed by: EMERGENCY MEDICINE

## 2025-01-19 RX ORDER — OXYCODONE AND ACETAMINOPHEN 5; 325 MG/1; MG/1
1 TABLET ORAL
Status: COMPLETED | OUTPATIENT
Start: 2025-01-19 | End: 2025-01-19

## 2025-01-19 RX ORDER — METHYLPREDNISOLONE 4 MG/1
TABLET ORAL
Qty: 1 KIT | Refills: 0 | Status: SHIPPED | OUTPATIENT
Start: 2025-01-19 | End: 2025-01-19

## 2025-01-19 RX ORDER — KETOROLAC TROMETHAMINE 15 MG/ML
30 INJECTION, SOLUTION INTRAMUSCULAR; INTRAVENOUS
Status: COMPLETED | OUTPATIENT
Start: 2025-01-19 | End: 2025-01-19

## 2025-01-19 RX ORDER — PREDNISONE 20 MG/1
60 TABLET ORAL
Status: COMPLETED | OUTPATIENT
Start: 2025-01-19 | End: 2025-01-19

## 2025-01-19 RX ORDER — METHYLPREDNISOLONE 4 MG/1
TABLET ORAL
Qty: 1 KIT | Refills: 0 | Status: SHIPPED | OUTPATIENT
Start: 2025-01-19

## 2025-01-19 RX ADMIN — PREDNISONE 60 MG: 20 TABLET ORAL at 01:54

## 2025-01-19 RX ADMIN — OXYCODONE HYDROCHLORIDE AND ACETAMINOPHEN 1 TABLET: 5; 325 TABLET ORAL at 01:54

## 2025-01-19 RX ADMIN — KETOROLAC TROMETHAMINE 30 MG: 15 INJECTION, SOLUTION INTRAMUSCULAR; INTRAVENOUS at 01:55

## 2025-01-19 ASSESSMENT — PAIN DESCRIPTION - PAIN TYPE: TYPE: CHRONIC PAIN

## 2025-01-19 ASSESSMENT — PAIN DESCRIPTION - ORIENTATION
ORIENTATION: RIGHT;LEFT
ORIENTATION: RIGHT

## 2025-01-19 ASSESSMENT — PAIN - FUNCTIONAL ASSESSMENT
PAIN_FUNCTIONAL_ASSESSMENT: ACTIVITIES ARE NOT PREVENTED
PAIN_FUNCTIONAL_ASSESSMENT: 0-10

## 2025-01-19 ASSESSMENT — PAIN DESCRIPTION - FREQUENCY: FREQUENCY: CONTINUOUS

## 2025-01-19 ASSESSMENT — PAIN DESCRIPTION - LOCATION
LOCATION: HIP
LOCATION: HIP

## 2025-01-19 ASSESSMENT — PAIN SCALES - GENERAL
PAINLEVEL_OUTOF10: 10
PAINLEVEL_OUTOF10: 10

## 2025-01-19 ASSESSMENT — PAIN DESCRIPTION - DESCRIPTORS: DESCRIPTORS: ACHING

## 2025-01-19 NOTE — ED PROVIDER NOTES
EMERGENCY DEPARTMENT HISTORY AND PHYSICAL EXAM    1:50 AM EST seen at this time in room 5        Date: 1/19/2025  Patient Name: Rosalia Downing    History of Presenting Illness     Chief Complaint   Patient presents with    Hip Pain         History Provided By: patient    Additional History (Context): Rosalia Downing is a 59 y.o. female presents with diabetes, history of sciatica with chronic problems arthritis in her back has severe sciatica for 2 days on the right side radiating down her leg.  No loss of strength or numbness.  Had previously been considered for surgery.  She has had good luck with the Medrol Dosepak previously.  Rates her pain severe..    PCP: Fox Rolle MD    Chief Complaint:   Duration:    Timing:    Location:   Quality:   Severity:   Modifying Factors:   Associated Symptoms:       Current Facility-Administered Medications   Medication Dose Route Frequency Provider Last Rate Last Admin    oxyCODONE-acetaminophen (PERCOCET) 5-325 MG per tablet 1 tablet  1 tablet Oral NOW Rikki Thompson MD        ketorolac (TORADOL) injection 30 mg  30 mg IntraMUSCular NOW Rikki Thompson MD        predniSONE (DELTASONE) tablet 60 mg  60 mg Oral NOW Rikki Thompson MD         Current Outpatient Medications   Medication Sig Dispense Refill    methylPREDNISolone (MEDROL, MAGDALENA,) 4 MG tablet Take by mouth. 1 kit 0    methocarbamol (ROBAXIN-750) 750 MG tablet Take 1 tablet by mouth every 6 hours as needed (back pain) 20 tablet 0    ketorolac (TORADOL) 10 MG tablet Take 1 tablet by mouth every 6 hours as needed for Pain 20 tablet 0    naproxen (NAPROSYN) 375 MG tablet Take 1 tablet by mouth 2 times daily (with meals) 60 tablet 3    aspirin 81 MG chewable tablet Take 1 tablet by mouth daily      dilTIAZem HCl ER Coated Beads 240 MG TB24 Take 1 tablet by mouth 2 times daily (Patient not taking: Reported on 8/26/2024)      gabapentin (NEURONTIN) 300 MG capsule Take 3 capsules by mouth 3 times

## 2025-01-19 NOTE — ED TRIAGE NOTES
Pt arrived via Triage ambulatory requested wheelchair. Pt has chronic back pain that radiates into her hips and down her legs, right worse than left. Sees ortho on the 5th. Has been having the pain for about 4 months per pt.

## 2025-02-04 NOTE — PROGRESS NOTES
[facility]  [address]  [place] [id number]  Tel: [phone]  Fax: [phone]          INITIAL CONSULTATION      HISTORY OF PRESENT ILLNESS:  Rosalia Downing is a 59 y.o. female who is referred from Elias Richmond MD secondary to L4-/5 ROJELIO. She rates her pain 10/10. Patient comes into the office with c/o lower back pain with symptoms into the hips bilaterally, radiating into the RLE extending anteriorly to the foot, and distal left lower extremity pain, ongoing for 4 months with no specific injury.    Patient says her pain is progressive in nature. She denies change in bowel or bladder habits. Her pain is not positionally exacerbated. Pt has lost about 27 lbs unintentionally; due to pain. She denies recent fevers, rashes, or skin sores. She denies a hx of stomach ulcers or bleeding disorders. She denies a hx of history of spinal surgery or injections. She denies recent physical therapy or chiropractic care. She has a hx of DM. Blood glucose level of 230 mg/dL on 11/13/2024, states her levels trend in the 200s; managed by her PCP. She reports that to her knowledge her kidneys function properly, GFR of 72 on 11/13/2024. She has taken Gabapentin; no relief, Robaxin; no relief, Naproxen; no relief, Tramadol; no relief. Pt states she completed her EMG as [facility], we have requested her EMG results from 's office.       PmHx of HTN, DM, obesity, claustrophobia, mini stroke (2005, on ASPIRIN managed by PCP)     Note from FNJONATHAN Guerrero dated 11/15/2024 indicating patient was seen for lumbar stenosis with LLE radicula symptoms. Last OV her Gabapentin was increased. Referred to me for ROJELIO L4/5    Note from  dated 12/10/2024 indicating patient was seen for severe back pain with symptoms into the right hip and left leg. Her lower extremity pain is worse than her back pain. She was very emotional in the office today. Says her legs feel very weak.

## 2025-02-05 ENCOUNTER — TELEPHONE (OUTPATIENT)
Age: 60
End: 2025-02-05

## 2025-02-05 ENCOUNTER — OFFICE VISIT (OUTPATIENT)
Age: 60
End: 2025-02-05
Payer: COMMERCIAL

## 2025-02-05 VITALS — WEIGHT: 199 LBS | BODY MASS INDEX: 35.26 KG/M2 | TEMPERATURE: 98 F | HEIGHT: 63 IN

## 2025-02-05 DIAGNOSIS — M48.061 FORAMINAL STENOSIS OF LUMBAR REGION: Primary | ICD-10-CM

## 2025-02-05 DIAGNOSIS — M47.816 LUMBAR FACET ARTHROPATHY: ICD-10-CM

## 2025-02-05 DIAGNOSIS — M54.16 LUMBAR NEURITIS: ICD-10-CM

## 2025-02-05 PROCEDURE — 99204 OFFICE O/P NEW MOD 45 MIN: CPT | Performed by: PHYSICAL MEDICINE & REHABILITATION

## 2025-02-05 NOTE — TELEPHONE ENCOUNTER
Patient called to request a new medication for pain called in to [facility] on [address].  Please review and advise patient at [phone], seen in office today.

## 2025-02-05 NOTE — TELEPHONE ENCOUNTER
After patient hung up on Bouchra expressing that she was going to kill herself, I called my Manager Monika Mo and was instructed to call the police to do a well check on the patient. I contacted [place] [employer]     [facility] [address] · [phone] and they are sending someone over to her now.

## 2025-02-05 NOTE — TELEPHONE ENCOUNTER
The pt was identified using 2 pt identifiers.  I called to let the pt know what Dr. Mcdowell had stated. Patient got very upset and stated \"I have been in pain for four months getting no sleep and I can't bear this anymore. I truly just want to die. I may as well kill myself if I have to wait two to three weeks to be scheduled for this injection. I was told it would be done today when I came into the office.\" I tried to speak in between the pt but she kept cutting me off. She states no one cares about her health and no one understands her pain. I was able to clarify the pt is suicidal and before I could get any more information stated she's \"just going to call Dr. Sandoval office.\"

## 2025-02-05 NOTE — TELEPHONE ENCOUNTER
"Pediatric Psychology Note    Start Time: 4:30 PM  End Time: 5:20 PM  Service: [id number]  Diagnosis: Adjustment disorder, unspecified type [F43.20], pediatric obesity    Subjective: Ismael is a 16 year old girl with symptoms of anxiety and depression who was referred for therapy from [facility].       Objective: Ismael arrived on time and was accompanied by her mother and father. Ismael presented with pleasant mood and appropriate emotion expressions. Ismael reported that she followed through on her goal from last session, in that she answered \"how are you\" questions honestly. Ismael stated that she felt better after opening up and sharing more with her mother and that she tried this more than once in the last 2 weeks. Writer provided psychoeducation on emotion identification and the content of emotions. Writer talked through examples with Ismael. Ismael demonstrated understanding these concepts and filled in one row of a thought record during session. Writer assigned the remaining rows for next session.      Assessment: Ismael was engaged and attentive throughout the session. Ismael already has a good emotion vocabulary and was quickly able to distinguish thoughts from feelings. She identifies blended emotions, like feeling happy and sad simultaneously.     Plan: Next session scheduled for Monday, 10/28 at 4:30.     Manfred Perkins M.A.  Psychology Intern  Pediatric Psychology Program  Department of Pediatrics    Camille Cole, PhD, LP, BCBA - D   of Pediatrics   Board Certified Behavior Analyst - Doctoral  Department of Pediatrics      I have read and agree with the contents of this note.    Camille Cole, Ph.D., L.P.  Department of Pediatrics    *no letter  " I tried calling the pt to discuss and there was no answer. I LMOM with my name and the office phone number requesting a call back to discuss.    [employer]

## 2025-02-27 ENCOUNTER — SCHEDULED TELEPHONE ENCOUNTER (OUTPATIENT)
Age: 60
End: 2025-02-27
Payer: COMMERCIAL

## 2025-02-27 DIAGNOSIS — M47.816 LUMBAR FACET ARTHROPATHY: ICD-10-CM

## 2025-02-27 DIAGNOSIS — M48.061 FORAMINAL STENOSIS OF LUMBAR REGION: Primary | ICD-10-CM

## 2025-02-27 DIAGNOSIS — M54.16 LUMBAR NEURITIS: ICD-10-CM

## 2025-02-27 DIAGNOSIS — F41.9 ANXIETY: ICD-10-CM

## 2025-02-27 PROCEDURE — 99212 OFFICE O/P EST SF 10 MIN: CPT | Performed by: NURSE PRACTITIONER

## 2025-02-27 RX ORDER — DIAZEPAM 5 MG/1
TABLET ORAL
Qty: 1 TABLET | Refills: 0 | Status: SHIPPED | OUTPATIENT
Start: 2025-02-27 | End: 2025-03-29

## 2025-02-27 RX ORDER — TRAZODONE HYDROCHLORIDE 50 MG/1
50 TABLET ORAL DAILY
COMMUNITY
Start: 2025-01-29

## 2025-02-27 NOTE — PROGRESS NOTES
Rosalia Downing is a 59 y.o. female evaluated via telephone on 2/27/2025 for Pain (Back, hip and right leg and ankle)  .      History of Present Illness: The patient is a 59-year-old female who has back pain and bilateral lower extremity pain down to her feet.  She is scheduled undergo L4-5 epidural steroid injection on March is 6/20/2025.  Her MRI demonstrates L5-S1 disc protrusion with stenosis.  She has tried and failed gabapentin, Robaxin, naproxen and tramadol.  She denies fever bowel bladder dysfunction.    Physical Exam: Patient is a 59-year-old female who was alert and oriented.  Spoke with fluency.  She did not appear to be in distress      Assessment/Plan:.  This is a patient who has back pain and bilateral lower extremity pain due to lumbar stenosis.  She will be undergoing a L4-5 epidural steroid injection on March 6.  I will send in Valium for her to take to the block suite and give to the nurse so she can administer it when it is time for her to have it.  We will see her back after the block.    Rosalia was seen today for pain.    Diagnoses and all orders for this visit:    Foraminal stenosis of lumbar region    Lumbar facet arthropathy    Lumbar neuritis    Anxiety  -     diazePAM (VALIUM) 5 MG tablet; Take valium to the block suite for the spine injection and give to the nurse there.          Documentation:  I communicated with the patient and/or health care decision maker about back and leg pain.   Details of this discussion including any medical advice provided: Yes    Total Time: 2:45 PM to 3:03 p.m. 18 minutes    Rosalia Downing was evaluated through a synchronous (real-time) audio encounter. Patient identification was verified at the start of the visit. She (or guardian if applicable) is aware that this is a billable service, which includes applicable co-pays. This visit was conducted with the patient's (and/or legal guardian's) verbal consent. She has not had a related appointment within my

## 2025-03-11 ENCOUNTER — APPOINTMENT (OUTPATIENT)
Facility: HOSPITAL | Age: 60
End: 2025-03-11
Payer: COMMERCIAL

## 2025-03-11 ENCOUNTER — HOSPITAL ENCOUNTER (EMERGENCY)
Facility: HOSPITAL | Age: 60
Discharge: HOME | End: 2025-03-11
Payer: COMMERCIAL

## 2025-03-11 VITALS
HEART RATE: 92 BPM | DIASTOLIC BLOOD PRESSURE: 75 MMHG | HEIGHT: 63 IN | TEMPERATURE: 98 F | WEIGHT: 195 LBS | OXYGEN SATURATION: 100 % | RESPIRATION RATE: 17 BRPM | BODY MASS INDEX: 34.55 KG/M2 | SYSTOLIC BLOOD PRESSURE: 145 MMHG

## 2025-03-11 DIAGNOSIS — I10 ESSENTIAL HYPERTENSION: ICD-10-CM

## 2025-03-11 DIAGNOSIS — M79.89 LEG SWELLING: Primary | ICD-10-CM

## 2025-03-11 DIAGNOSIS — B35.1 ONYCHOMYCOSIS: ICD-10-CM

## 2025-03-11 DIAGNOSIS — D64.9 ANEMIA, UNSPECIFIED TYPE: ICD-10-CM

## 2025-03-11 LAB
ALBUMIN SERPL-MCNC: 3.7 G/DL (ref 3.4–5)
ALBUMIN/GLOB SERPL: 0.9 (ref 0.8–1.7)
ALP SERPL-CCNC: 51 U/L (ref 45–117)
ALT SERPL-CCNC: 16 U/L (ref 13–56)
ANION GAP SERPL CALC-SCNC: 6 MMOL/L (ref 3–18)
AST SERPL-CCNC: 11 U/L (ref 10–38)
BASOPHILS # BLD: 0.08 K/UL (ref 0–0.1)
BASOPHILS NFR BLD: 1 % (ref 0–2)
BILIRUB SERPL-MCNC: 0.2 MG/DL (ref 0.2–1)
BUN SERPL-MCNC: 16 MG/DL (ref 7–18)
BUN/CREAT SERPL: 14 (ref 12–20)
CALCIUM SERPL-MCNC: 9.5 MG/DL (ref 8.5–10.1)
CHLORIDE SERPL-SCNC: 101 MMOL/L (ref 100–111)
CO2 SERPL-SCNC: 27 MMOL/L (ref 21–32)
CREAT SERPL-MCNC: 1.13 MG/DL (ref 0.6–1.3)
DIFFERENTIAL METHOD BLD: ABNORMAL
EOSINOPHIL # BLD: 0.12 K/UL (ref 0–0.4)
EOSINOPHIL NFR BLD: 1.5 % (ref 0–5)
ERYTHROCYTE [DISTWIDTH] IN BLOOD BY AUTOMATED COUNT: 13.9 % (ref 11.6–14.5)
GLOBULIN SER CALC-MCNC: 4.1 G/DL (ref 2–4)
GLUCOSE SERPL-MCNC: 133 MG/DL (ref 74–99)
HCT VFR BLD AUTO: 32.7 % (ref 35–45)
HGB BLD-MCNC: 10.6 G/DL (ref 12–16)
IMM GRANULOCYTES # BLD AUTO: 0.02 K/UL (ref 0–0.04)
IMM GRANULOCYTES NFR BLD AUTO: 0.3 % (ref 0–0.5)
LYMPHOCYTES # BLD: 3.55 K/UL (ref 0.9–3.6)
LYMPHOCYTES NFR BLD: 44.4 % (ref 21–52)
MCH RBC QN AUTO: 28 PG (ref 24–34)
MCHC RBC AUTO-ENTMCNC: 32.4 G/DL (ref 31–37)
MCV RBC AUTO: 86.5 FL (ref 78–100)
MONOCYTES # BLD: 0.5 K/UL (ref 0.05–1.2)
MONOCYTES NFR BLD: 6.3 % (ref 3–10)
NEUTS SEG # BLD: 3.72 K/UL (ref 1.8–8)
NEUTS SEG NFR BLD: 46.5 % (ref 40–73)
NRBC # BLD: 0 K/UL (ref 0–0.01)
NRBC BLD-RTO: 0 PER 100 WBC
NT PRO BNP: 101 PG/ML (ref 0–900)
PLATELET # BLD AUTO: 467 K/UL (ref 135–420)
PMV BLD AUTO: 10.6 FL (ref 9.2–11.8)
POTASSIUM SERPL-SCNC: 3.8 MMOL/L (ref 3.5–5.5)
PROT SERPL-MCNC: 7.8 G/DL (ref 6.4–8.2)
RBC # BLD AUTO: 3.78 M/UL (ref 4.2–5.3)
SODIUM SERPL-SCNC: 134 MMOL/L (ref 136–145)
WBC # BLD AUTO: 8 K/UL (ref 4.6–13.2)

## 2025-03-11 PROCEDURE — 85025 COMPLETE CBC W/AUTO DIFF WBC: CPT

## 2025-03-11 PROCEDURE — 73630 X-RAY EXAM OF FOOT: CPT

## 2025-03-11 PROCEDURE — 93005 ELECTROCARDIOGRAM TRACING: CPT | Performed by: EMERGENCY MEDICINE

## 2025-03-11 PROCEDURE — 80053 COMPREHEN METABOLIC PANEL: CPT

## 2025-03-11 PROCEDURE — 83880 ASSAY OF NATRIURETIC PEPTIDE: CPT

## 2025-03-11 PROCEDURE — 99285 EMERGENCY DEPT VISIT HI MDM: CPT

## 2025-03-11 RX ORDER — TERBINAFINE HYDROCHLORIDE 250 MG/1
250 TABLET ORAL DAILY
Qty: 84 TABLET | Refills: 0 | Status: SHIPPED | OUTPATIENT
Start: 2025-03-11 | End: 2025-06-03

## 2025-03-11 ASSESSMENT — PAIN DESCRIPTION - ORIENTATION: ORIENTATION: RIGHT;LEFT

## 2025-03-11 ASSESSMENT — PAIN SCALES - GENERAL: PAINLEVEL_OUTOF10: 10

## 2025-03-11 ASSESSMENT — PAIN DESCRIPTION - LOCATION: LOCATION: LEG;TOE (COMMENT WHICH ONE)

## 2025-03-11 ASSESSMENT — PAIN - FUNCTIONAL ASSESSMENT: PAIN_FUNCTIONAL_ASSESSMENT: 0-10

## 2025-03-11 NOTE — ED TRIAGE NOTES
Pt came ambulatory to triage using rollator walker c/o BLE pain and swelling x 3 days. Pt also report right big toe pain.    Hx of DM, HTN.

## 2025-03-11 NOTE — DISCHARGE INSTRUCTIONS
Thank you for allowing me to take care of you today.    Please take the next few days to keep your legs elevated and try to rest them.    Please use the terbinafine for the fungal infection on your toes.    Your x-ray looked reassuring but I am waiting for the radiologist to over read it.  We will call you if there is an abnormality.    Please follow-up with the podiatrist that I have referred you to.    Please do not hesitate to return to the emergency department for any new worsening or concerning signs or symptoms.    Please follow-up with your primary care provider next Monday with your appointment.

## 2025-03-11 NOTE — FLOWSHEET NOTE
03/11/25 1932   AVS Reviewed   AVS & discharge instructions reviewed with patient and/or representative? Yes   Reviewed instructions with Patient   Level of Understanding Questions answered;Teach back completed;Verbalized understanding

## 2025-03-11 NOTE — ED PROVIDER NOTES
the skin 4 times daily (before meals and nightly)      lisinopril (PRINIVIL;ZESTRIL) 30 MG tablet Take 1 tablet by mouth 2 times daily      metFORMIN (GLUCOPHAGE) 500 MG tablet Take 2 tablets by mouth 2 times daily (with meals)      simvastatin (ZOCOR) 40 MG tablet Take 1 tablet by mouth            DIAGNOSTIC RESULTS   LABS:    Recent Results (from the past 24 hours)   EKG 12 Lead    Collection Time: 03/11/25  4:42 PM   Result Value Ref Range    Ventricular Rate 96 BPM    Atrial Rate 96 BPM    P-R Interval 172 ms    QRS Duration 74 ms    Q-T Interval 372 ms    QTc Calculation (Bazett) 469 ms    P Axis 47 degrees    R Axis -1 degrees    T Axis 41 degrees    Diagnosis       Normal sinus rhythm  Minimal voltage criteria for LVH, may be normal variant ( R in aVL )  Cannot rule out Anterior infarct , age undetermined  Abnormal ECG  When compared with ECG of 30-OCT-2017 19:59,  Minimal criteria for Anterior infarct are now present     CBC with Auto Differential    Collection Time: 03/11/25  4:50 PM   Result Value Ref Range    WBC 8.0 4.6 - 13.2 K/uL    RBC 3.78 (L) 4.20 - 5.30 M/uL    Hemoglobin 10.6 (L) 12.0 - 16.0 g/dL    Hematocrit 32.7 (L) 35.0 - 45.0 %    MCV 86.5 78.0 - 100.0 FL    MCH 28.0 24.0 - 34.0 PG    MCHC 32.4 31.0 - 37.0 g/dL    RDW 13.9 11.6 - 14.5 %    Platelets 467 (H) 135 - 420 K/uL    MPV 10.6 9.2 - 11.8 FL    Nucleated RBCs 0.0 0  WBC    nRBC 0.00 0.00 - 0.01 K/uL    Neutrophils % 46.5 40.0 - 73.0 %    Lymphocytes % 44.4 21.0 - 52.0 %    Monocytes % 6.3 3.0 - 10.0 %    Eosinophils % 1.5 0.0 - 5.0 %    Basophils % 1.0 0.0 - 2.0 %    Immature Granulocytes % 0.3 0.0 - 0.5 %    Neutrophils Absolute 3.72 1.80 - 8.00 K/UL    Lymphocytes Absolute 3.55 0.90 - 3.60 K/UL    Monocytes Absolute 0.50 0.05 - 1.20 K/UL    Eosinophils Absolute 0.12 0.00 - 0.40 K/UL    Basophils Absolute 0.08 0.00 - 0.10 K/UL    Immature Granulocytes Absolute 0.02 0.00 - 0.04 K/UL    Differential Type AUTOMATED

## 2025-03-12 LAB
EKG ATRIAL RATE: 96 BPM
EKG DIAGNOSIS: NORMAL
EKG P AXIS: 47 DEGREES
EKG P-R INTERVAL: 172 MS
EKG Q-T INTERVAL: 372 MS
EKG QRS DURATION: 74 MS
EKG QTC CALCULATION (BAZETT): 469 MS
EKG R AXIS: -1 DEGREES
EKG T AXIS: 41 DEGREES
EKG VENTRICULAR RATE: 96 BPM

## 2025-03-12 PROCEDURE — 93010 ELECTROCARDIOGRAM REPORT: CPT | Performed by: INTERNAL MEDICINE

## 2025-03-20 ENCOUNTER — TRANSCRIBE ORDERS (OUTPATIENT)
Facility: HOSPITAL | Age: 60
End: 2025-03-20

## 2025-03-20 DIAGNOSIS — Z12.31 ENCOUNTER FOR SCREENING MAMMOGRAM FOR MALIGNANT NEOPLASM OF BREAST: Primary | ICD-10-CM

## 2025-03-26 ENCOUNTER — OFFICE VISIT (OUTPATIENT)
Age: 60
End: 2025-03-26

## 2025-03-26 VITALS — BODY MASS INDEX: 36.5 KG/M2 | HEIGHT: 63 IN | WEIGHT: 206 LBS

## 2025-03-26 DIAGNOSIS — M17.11 PRIMARY OSTEOARTHRITIS OF RIGHT KNEE: ICD-10-CM

## 2025-03-26 DIAGNOSIS — M25.561 RIGHT KNEE PAIN, UNSPECIFIED CHRONICITY: Primary | ICD-10-CM

## 2025-03-26 RX ORDER — PREGABALIN 75 MG/1
75 CAPSULE ORAL 2 TIMES DAILY
COMMUNITY

## 2025-03-26 RX ORDER — OMEPRAZOLE 20 MG/1
20 CAPSULE, DELAYED RELEASE ORAL DAILY
COMMUNITY

## 2025-03-26 RX ORDER — TRIAMCINOLONE ACETONIDE 40 MG/ML
40 INJECTION, SUSPENSION INTRA-ARTICULAR; INTRAMUSCULAR ONCE
Status: COMPLETED | OUTPATIENT
Start: 2025-03-26 | End: 2025-03-26

## 2025-03-26 RX ADMIN — TRIAMCINOLONE ACETONIDE 40 MG: 40 INJECTION, SUSPENSION INTRA-ARTICULAR; INTRAMUSCULAR at 14:07

## 2025-03-26 NOTE — PROGRESS NOTES
presence of Yonatan Mao MD  Electronically signed: LOR Vgea. 3/26/2025 12:06 PM EDT    I, Yonatan Mao MD, personally performed the services described in this documentation. I have authorized the scribe to complete the medical record entries input within this chart. I have reviewed the chart and agree that the record reflects my personal performance and is accurate and complete. [Electronically Signed: Yonatan Mao MD. 3/26/2025 12:06 PM EDT]

## 2025-04-21 ENCOUNTER — HOSPITAL ENCOUNTER (EMERGENCY)
Facility: HOSPITAL | Age: 60
Discharge: HOME | End: 2025-04-21
Attending: STUDENT IN AN ORGANIZED HEALTH CARE EDUCATION/TRAINING PROGRAM
Payer: COMMERCIAL

## 2025-04-21 VITALS
DIASTOLIC BLOOD PRESSURE: 81 MMHG | HEIGHT: 63 IN | TEMPERATURE: 98 F | RESPIRATION RATE: 16 BRPM | OXYGEN SATURATION: 99 % | BODY MASS INDEX: 36.86 KG/M2 | SYSTOLIC BLOOD PRESSURE: 160 MMHG | HEART RATE: 80 BPM | WEIGHT: 208 LBS

## 2025-04-21 DIAGNOSIS — M79.604 BILATERAL LEG PAIN: Primary | ICD-10-CM

## 2025-04-21 DIAGNOSIS — M79.605 BILATERAL LEG PAIN: Primary | ICD-10-CM

## 2025-04-21 LAB — D DIMER PPP FEU-MCNC: 0.34 UG/ML(FEU)

## 2025-04-21 PROCEDURE — 99284 EMERGENCY DEPT VISIT MOD MDM: CPT

## 2025-04-21 PROCEDURE — 85379 FIBRIN DEGRADATION QUANT: CPT

## 2025-04-21 PROCEDURE — 96374 THER/PROPH/DIAG INJ IV PUSH: CPT

## 2025-04-21 PROCEDURE — 6360000002 HC RX W HCPCS: Performed by: STUDENT IN AN ORGANIZED HEALTH CARE EDUCATION/TRAINING PROGRAM

## 2025-04-21 PROCEDURE — 6370000000 HC RX 637 (ALT 250 FOR IP): Performed by: STUDENT IN AN ORGANIZED HEALTH CARE EDUCATION/TRAINING PROGRAM

## 2025-04-21 RX ORDER — PREGABALIN 75 MG/1
75 CAPSULE ORAL
Status: COMPLETED | OUTPATIENT
Start: 2025-04-21 | End: 2025-04-21

## 2025-04-21 RX ORDER — KETOROLAC TROMETHAMINE 15 MG/ML
15 INJECTION, SOLUTION INTRAMUSCULAR; INTRAVENOUS
Status: COMPLETED | OUTPATIENT
Start: 2025-04-21 | End: 2025-04-21

## 2025-04-21 RX ORDER — HYDROCODONE BITARTRATE AND ACETAMINOPHEN 5; 325 MG/1; MG/1
1 TABLET ORAL
Refills: 0 | Status: COMPLETED | OUTPATIENT
Start: 2025-04-21 | End: 2025-04-21

## 2025-04-21 RX ORDER — KETOROLAC TROMETHAMINE 10 MG/1
10 TABLET, FILM COATED ORAL EVERY 6 HOURS PRN
Qty: 20 TABLET | Refills: 0 | Status: SHIPPED | OUTPATIENT
Start: 2025-04-21

## 2025-04-21 RX ADMIN — PREGABALIN 75 MG: 75 CAPSULE ORAL at 04:58

## 2025-04-21 RX ADMIN — KETOROLAC TROMETHAMINE 15 MG: 15 INJECTION, SOLUTION INTRAMUSCULAR; INTRAVENOUS at 04:58

## 2025-04-21 RX ADMIN — HYDROCODONE BITARTRATE AND ACETAMINOPHEN 1 TABLET: 5; 325 TABLET ORAL at 04:57

## 2025-04-21 ASSESSMENT — PAIN DESCRIPTION - DESCRIPTORS: DESCRIPTORS: THROBBING

## 2025-04-21 ASSESSMENT — PAIN DESCRIPTION - LOCATION: LOCATION: LEG;FOOT

## 2025-04-21 ASSESSMENT — PAIN - FUNCTIONAL ASSESSMENT: PAIN_FUNCTIONAL_ASSESSMENT: 0-10

## 2025-04-21 ASSESSMENT — PAIN DESCRIPTION - ONSET: ONSET: AWAKENED FROM SLEEP

## 2025-04-21 ASSESSMENT — ENCOUNTER SYMPTOMS: SHORTNESS OF BREATH: 0

## 2025-04-21 ASSESSMENT — PAIN SCALES - GENERAL: PAINLEVEL_OUTOF10: 10

## 2025-04-21 ASSESSMENT — PAIN DESCRIPTION - PAIN TYPE: TYPE: ACUTE PAIN

## 2025-04-21 ASSESSMENT — PAIN DESCRIPTION - ORIENTATION: ORIENTATION: LOWER;RIGHT;LEFT

## 2025-04-21 NOTE — DISCHARGE INSTRUCTIONS
You were evaluated for bilateral leg and foot pain .  Based on your work-up it was deemed that you were stable for discharge.  Please  your medication of toradol which was prescribed to you.  Please follow-up with your primary care physician if you have any further concerns and go over your work-up.  If you experience any chest pain, shortness of breath, worsening abdominal pain, vomiting blood, worsening headache, seizures, or any worsening of your symptoms please return to the emergency department immediately.  If you have any pending results or any further questions please contact the emergency department at [phone].

## 2025-04-21 NOTE — ED TRIAGE NOTES
Pt to ED c/o bilateral leg nerve pain that started Thursday in both lower legs. Pt walks with a walker.

## 2025-04-21 NOTE — ED PROVIDER NOTES
EMERGENCY DEPARTMENT HISTORY AND PHYSICAL EXAM    10:40 AM      Date: 4/21/2025  Patient Name: Rosalia Downing    History of Presenting Illness     Chief Complaint   Patient presents with    Leg Pain       History From: Patient    Rosalia Downing is a 59 y.o. female   HPI  59-year-old female with history of hypertension, diabetes, obesity, sciatica who presents with bilateral feet pain.  Patient said the symptoms been going on for past 4 days.  Denies any changes in meds, sick contacts, or any other changes.  Denies any trauma.  States that the pain is 10 out of 10.  She is try to use heating pads but it is really not helping.  When assessing ROS she denies any fever, nausea, vomiting, chest pain, abdominal pain, rashes, or any other changes.    Nursing Notes were all reviewed and agreed with or any disagreements were addressed in the HPI.    PCP: Fox Rolle MD    No current facility-administered medications for this encounter.     Current Outpatient Medications   Medication Sig Dispense Refill    ketorolac (TORADOL) 10 MG tablet Take 1 tablet by mouth every 6 hours as needed for Pain 20 tablet 0    omeprazole (PRILOSEC) 20 MG delayed release capsule Take 1 capsule by mouth daily      pregabalin (LYRICA) 75 MG capsule Take 1 capsule by mouth 2 times daily. Max Daily Amount: 150 mg      terbinafine (LAMISIL) 250 MG tablet Take 1 tablet by mouth daily 84 tablet 0    traZODone (DESYREL) 50 MG tablet Take 1 tablet by mouth daily      methocarbamol (ROBAXIN-750) 750 MG tablet Take 1 tablet by mouth every 6 hours as needed (back pain) (Patient not taking: Reported on 2/27/2025) 20 tablet 0    ketorolac (TORADOL) 10 MG tablet Take 1 tablet by mouth every 6 hours as needed for Pain (Patient not taking: Reported on 2/5/2025) 20 tablet 0    naproxen (NAPROSYN) 375 MG tablet Take 1 tablet by mouth 2 times daily (with meals) (Patient not taking: Reported on 2/5/2025) 60 tablet 3    aspirin 81 MG chewable [place]  [place], [place], [address]  Phone: [phone]         Dario Robles MD  04/24/25 [id number]

## 2025-04-21 NOTE — ED NOTES
EMERGENCY DEPARTMENT HISTORY AND PHYSICAL EXAM    5:13 AM      Date: 4/21/2025  Patient Name: Rosalia Downing    History of Presenting Illness     Chief Complaint   Patient presents with    Leg Pain       History From: Patient    Rosalia Downing is a 59 y.o. female with a PMH of HTN, DM, hypercholesterolemia, chronic bilateral neuropathic pain due to lumbar stenosis     HPI  Patient presents with a chief complaint of bilateral leg pain. She has chronic neuropathic pain in both of her legs, however it has worsened since Thursday. She has tried taking Tylenol for her pain. She typically does not take medications to manage her neuropathic pain. She had a spinal injection last month however it did not provide her with pain relief. Denies loss of bowel or bladder function, numbness/tingling, or weakness in her lower extremities.    Nursing Notes were all reviewed and agreed with or any disagreements were addressed in the HPI.    PCP: Fox Rolle MD    No current facility-administered medications for this encounter.     Current Outpatient Medications   Medication Sig Dispense Refill    omeprazole (PRILOSEC) 20 MG delayed release capsule Take 1 capsule by mouth daily      pregabalin (LYRICA) 75 MG capsule Take 1 capsule by mouth 2 times daily. Max Daily Amount: 150 mg      terbinafine (LAMISIL) 250 MG tablet Take 1 tablet by mouth daily 84 tablet 0    traZODone (DESYREL) 50 MG tablet Take 1 tablet by mouth daily      methocarbamol (ROBAXIN-750) 750 MG tablet Take 1 tablet by mouth every 6 hours as needed (back pain) (Patient not taking: Reported on 2/27/2025) 20 tablet 0    ketorolac (TORADOL) 10 MG tablet Take 1 tablet by mouth every 6 hours as needed for Pain (Patient not taking: Reported on 2/5/2025) 20 tablet 0    naproxen (NAPROSYN) 375 MG tablet Take 1 tablet by mouth 2 times daily (with meals) (Patient not taking: Reported on 2/5/2025) 60 tablet 3    aspirin 81 MG chewable tablet Take 1 tablet by mouth  75 mg (75 mg Oral Given 4/21/25 7356)       CONSULTS: (Who and What was discussed)  None    Chronic Conditions: ***    Social Determinants affecting Dx or Tx: {Social Barriers (Optional):11759}    Records Reviewed (source and summary of external notes): {CDIRECORDSREVIEWED:8296045930}    Procedures    Critical Care Time: ***    SCREENING TOOLS:  {Screening Tools:11572::\"None\"}    CLINICAL MANAGEMENT TOOLS:  {CMT List:61734::\"Not Applicable\"}      Diagnosis     Clinical Impression: No diagnosis found.    Disposition: ***    No follow-up provider specified.     Disclaimer: Sections of this note are dictated using utilizing voice recognition software.  Minor typographical errors may be present. If questions arise, please do not hesitate to contact me or call our department.      Dario Robles MD  Emergency Medicine  [address] [place] [address], [address]  Phone: [phone]

## 2025-05-02 ENCOUNTER — APPOINTMENT (OUTPATIENT)
Facility: HOSPITAL | Age: 60
End: 2025-05-02
Payer: COMMERCIAL

## 2025-05-02 ENCOUNTER — HOSPITAL ENCOUNTER (EMERGENCY)
Facility: HOSPITAL | Age: 60
Discharge: HOME | End: 2025-05-02
Payer: COMMERCIAL

## 2025-05-02 VITALS
WEIGHT: 209 LBS | BODY MASS INDEX: 37.03 KG/M2 | RESPIRATION RATE: 18 BRPM | HEIGHT: 63 IN | HEART RATE: 94 BPM | SYSTOLIC BLOOD PRESSURE: 153 MMHG | DIASTOLIC BLOOD PRESSURE: 66 MMHG | OXYGEN SATURATION: 100 % | TEMPERATURE: 98.6 F

## 2025-05-02 DIAGNOSIS — I73.9 PERIPHERAL ARTERIAL DISEASE: Primary | ICD-10-CM

## 2025-05-02 DIAGNOSIS — R79.89 ELEVATED TROPONIN: ICD-10-CM

## 2025-05-02 DIAGNOSIS — M79.89 LEFT LEG SWELLING: ICD-10-CM

## 2025-05-02 DIAGNOSIS — M79.89 RIGHT LEG SWELLING: ICD-10-CM

## 2025-05-02 LAB
ANION GAP SERPL CALC-SCNC: 14 MMOL/L (ref 3–18)
BASOPHILS # BLD: 0.08 K/UL (ref 0–0.1)
BASOPHILS NFR BLD: 1 % (ref 0–2)
BUN SERPL-MCNC: 17 MG/DL (ref 6–23)
BUN/CREAT SERPL: 18 (ref 12–20)
CALCIUM SERPL-MCNC: 10 MG/DL (ref 8.5–10.1)
CHLORIDE SERPL-SCNC: 104 MMOL/L (ref 98–107)
CK SERPL-CCNC: 219 U/L (ref 26–192)
CO2 SERPL-SCNC: 23 MMOL/L (ref 21–32)
CREAT SERPL-MCNC: 0.95 MG/DL (ref 0.6–1.3)
DIFFERENTIAL METHOD BLD: ABNORMAL
ECHO BSA: 2.05 M2
EOSINOPHIL # BLD: 0.11 K/UL (ref 0–0.4)
EOSINOPHIL NFR BLD: 1.4 % (ref 0–5)
ERYTHROCYTE [DISTWIDTH] IN BLOOD BY AUTOMATED COUNT: 13.4 % (ref 11.6–14.5)
GLUCOSE SERPL-MCNC: 114 MG/DL (ref 74–108)
HCT VFR BLD AUTO: 30.4 % (ref 35–45)
HGB BLD-MCNC: 9.9 G/DL (ref 12–16)
IMM GRANULOCYTES # BLD AUTO: 0.02 K/UL (ref 0–0.04)
IMM GRANULOCYTES NFR BLD AUTO: 0.2 % (ref 0–0.5)
LYMPHOCYTES # BLD: 2.61 K/UL (ref 0.9–3.6)
LYMPHOCYTES NFR BLD: 32.3 % (ref 21–52)
MAGNESIUM SERPL-MCNC: 2 MG/DL (ref 1.6–2.6)
MCH RBC QN AUTO: 28 PG (ref 24–34)
MCHC RBC AUTO-ENTMCNC: 32.6 G/DL (ref 31–37)
MCV RBC AUTO: 86.1 FL (ref 78–100)
MONOCYTES # BLD: 0.6 K/UL (ref 0.05–1.2)
MONOCYTES NFR BLD: 7.4 % (ref 3–10)
NEUTS SEG # BLD: 4.65 K/UL (ref 1.8–8)
NEUTS SEG NFR BLD: 57.7 % (ref 40–73)
NRBC # BLD: 0 K/UL (ref 0–0.01)
NRBC BLD-RTO: 0 PER 100 WBC
PLATELET # BLD AUTO: 436 K/UL (ref 135–420)
PMV BLD AUTO: 10 FL (ref 9.2–11.8)
POTASSIUM SERPL-SCNC: 4.7 MMOL/L (ref 3.5–5.5)
RBC # BLD AUTO: 3.53 M/UL (ref 4.2–5.3)
SODIUM SERPL-SCNC: 140 MMOL/L (ref 136–145)
TROPONIN T SERPL HS-MCNC: 20.6 NG/L (ref 0–14)
VAS LEFT ABI: 1.43
VAS LEFT ARM BP: 168 MMHG
VAS LEFT CALF PRESSURE: 240 MMHG
VAS LEFT DORSALIS PEDIS BP: 198 MMHG
VAS LEFT PTA BP: 240 MMHG
VAS LEFT TBI: 0.93
VAS LEFT TOE PRESSURE: 157 MMHG
VAS RIGHT ABI: 1.43
VAS RIGHT ARM BP: 166 MMHG
VAS RIGHT CALF PRESSURE: 240 MMHG
VAS RIGHT DORSALIS PEDIS BP: 200 MMHG
VAS RIGHT PTA BP: 240 MMHG
VAS RIGHT TBI: 0.81
VAS RIGHT TOE PRESSURE: 136 MMHG
WBC # BLD AUTO: 8.1 K/UL (ref 4.6–13.2)

## 2025-05-02 PROCEDURE — 80048 BASIC METABOLIC PNL TOTAL CA: CPT

## 2025-05-02 PROCEDURE — 99284 EMERGENCY DEPT VISIT MOD MDM: CPT

## 2025-05-02 PROCEDURE — 93923 UPR/LXTR ART STDY 3+ LVLS: CPT

## 2025-05-02 PROCEDURE — 6360000002 HC RX W HCPCS

## 2025-05-02 PROCEDURE — 83735 ASSAY OF MAGNESIUM: CPT

## 2025-05-02 PROCEDURE — 93005 ELECTROCARDIOGRAM TRACING: CPT

## 2025-05-02 PROCEDURE — 93923 UPR/LXTR ART STDY 3+ LVLS: CPT | Performed by: INTERNAL MEDICINE

## 2025-05-02 PROCEDURE — 96375 TX/PRO/DX INJ NEW DRUG ADDON: CPT

## 2025-05-02 PROCEDURE — 84484 ASSAY OF TROPONIN QUANT: CPT

## 2025-05-02 PROCEDURE — 96374 THER/PROPH/DIAG INJ IV PUSH: CPT

## 2025-05-02 PROCEDURE — 82550 ASSAY OF CK (CPK): CPT

## 2025-05-02 PROCEDURE — 85025 COMPLETE CBC W/AUTO DIFF WBC: CPT

## 2025-05-02 RX ORDER — MORPHINE SULFATE 4 MG/ML
4 INJECTION, SOLUTION INTRAMUSCULAR; INTRAVENOUS
Status: COMPLETED | OUTPATIENT
Start: 2025-05-02 | End: 2025-05-02

## 2025-05-02 RX ORDER — ONDANSETRON 2 MG/ML
4 INJECTION INTRAMUSCULAR; INTRAVENOUS
Status: COMPLETED | OUTPATIENT
Start: 2025-05-02 | End: 2025-05-02

## 2025-05-02 RX ADMIN — MORPHINE SULFATE 4 MG: 4 INJECTION, SOLUTION INTRAMUSCULAR; INTRAVENOUS at 20:36

## 2025-05-02 RX ADMIN — ONDANSETRON 4 MG: 2 INJECTION, SOLUTION INTRAMUSCULAR; INTRAVENOUS at 20:35

## 2025-05-02 ASSESSMENT — PAIN DESCRIPTION - LOCATION
LOCATION: LEG
LOCATION: LEG

## 2025-05-02 ASSESSMENT — PAIN DESCRIPTION - ORIENTATION
ORIENTATION: LEFT;RIGHT
ORIENTATION: RIGHT;LEFT;LOWER

## 2025-05-02 ASSESSMENT — ENCOUNTER SYMPTOMS
SHORTNESS OF BREATH: 0
CHEST TIGHTNESS: 0
COUGH: 0
ABDOMINAL PAIN: 0
VOMITING: 0
NAUSEA: 0
BACK PAIN: 0

## 2025-05-02 ASSESSMENT — PAIN - FUNCTIONAL ASSESSMENT: PAIN_FUNCTIONAL_ASSESSMENT: 0-10

## 2025-05-02 ASSESSMENT — PAIN DESCRIPTION - PAIN TYPE: TYPE: ACUTE PAIN

## 2025-05-02 ASSESSMENT — LIFESTYLE VARIABLES
HOW OFTEN DO YOU HAVE A DRINK CONTAINING ALCOHOL: NEVER
HOW MANY STANDARD DRINKS CONTAINING ALCOHOL DO YOU HAVE ON A TYPICAL DAY: PATIENT DOES NOT DRINK

## 2025-05-02 ASSESSMENT — PAIN SCALES - GENERAL
PAINLEVEL_OUTOF10: 10
PAINLEVEL_OUTOF10: 10

## 2025-05-02 ASSESSMENT — PAIN DESCRIPTION - DESCRIPTORS: DESCRIPTORS: SHARP;SHOOTING

## 2025-05-02 NOTE — ED TRIAGE NOTES
Pt in ED with c/o bilateral leg swelling and right thigh pain onset last week. Pt states her feet are heavy.

## 2025-05-02 NOTE — ED PROVIDER NOTES
[facility] EMERGENCY DEPARTMENT  EMERGENCY DEPARTMENT ENCOUNTER      Pt Name: Rosalia Downing  MRN: [id number]  Birthdate 1965  Date of evaluation: 5/2/2025  Provider: JESUS Amaral  8:30 PM    CHIEF COMPLAINT       Chief Complaint   Patient presents with    Leg Swelling         HISTORY OF PRESENT ILLNESS    Rosalia Downing is a 59 y.o. female who presents to the emergency department with leg pain.     60 y/o F with pmhx of cellulitis, htn, DM presents to ED with bilateral leg pain, leg swelling and right thigh pain onset last week. Pt states her feet are heavy. She was seen in the ED for same, as well as by her PCP but medications not helping. Denies numbness or tingling. Jack injury. Denies fever, chills or wound.         Nursing Notes were reviewed.    REVIEW OF SYSTEMS       Review of Systems   Constitutional:  Negative for chills, fatigue and fever.   HENT:  Negative for congestion and ear pain.    Eyes:  Negative for visual disturbance.   Respiratory:  Negative for cough, chest tightness and shortness of breath.    Cardiovascular:  Positive for leg swelling. Negative for chest pain and palpitations.   Gastrointestinal:  Negative for abdominal pain, nausea and vomiting.   Genitourinary:  Negative for dysuria and flank pain.   Musculoskeletal:  Positive for arthralgias. Negative for back pain, myalgias, neck pain and neck stiffness.        Leg pain     Skin:  Negative for pallor and wound.   Neurological:  Negative for dizziness, seizures, syncope, weakness, numbness and headaches.   Hematological: Negative.    Psychiatric/Behavioral: Negative.         Except as noted above the remainder of the review of systems was reviewed and negative.       PAST MEDICAL HISTORY     Past Medical History:   Diagnosis Date    Arthritis     Diabetes (HCC)     Hypertension     Morbid obesity (HCC)     BMI-50    Psychiatric disorder     CLAUSTROPHOBIA    Stroke (HCC) 2006    no residuals     Hemoglobin 9.9 (*)     Hematocrit 30.4 (*)     Platelets 436 (*)     All other components within normal limits   BASIC METABOLIC PANEL   TROPONIN   CK   MAGNESIUM       All other labs were within normal range or not returned as of this dictation.    EMERGENCY DEPARTMENT COURSE and DIFFERENTIAL DIAGNOSIS/MDM:   Vitals:    Vitals:    05/02/25 1644 05/02/25 1645   BP: (!) 170/79    Pulse: 94    Resp: 18    Temp:  98.6 °F (37 °C)   SpO2: 100%    Weight: 94.8 kg (209 lb)    Height: 1.6 m (5' 3\")            Medical Decision Making  Amount and/or Complexity of Data Reviewed  Labs: ordered.  ECG/medicine tests: ordered.    Chart review, HPI, Nursing Notes, VS, PE    58 y/o F with presents to ED with bilateral leg pain, leg swelling and right thigh pain onset last week. Pt states her feet are heavy. She was seen in the ED for same, as well as by her PCP but medications not helping. Denies numbness or tingling. Jack injury. Denies fever, chills or wound.     Ddx: DVT, electrolyte imbalance, strain/ sprain and more not limited to this list.     Duplex study, labs    Chart reviewed and signed out to Dr. Coyle.     REASSESSMENT     ED Course as of 05/02/25 2030   Fri May 02, 2025   1815 No leukocytosis. Hgb 9.9.  [AC]   2023 Lab contacted for blood status, 30 minutes for results.  [AC]      ED Course User Index  [AC] Miladys Lopez FNP             FINAL IMPRESSION      1. Peripheral arterial disease          DISPOSITION/PLAN   DISPOSITION  05/02/2025 05:08:50 PM               PATIENT REFERRED TO:  No follow-up provider specified.    DISCHARGE MEDICATIONS:  New Prescriptions    No medications on file     Controlled Substances Monitoring:          No data to display                (Please note that portions of this note were completed with a voice recognition program.  Efforts were made to edit the dictations but occasionally words are mis-transcribed.)    JESUS Amaral (electronically signed)              John

## 2025-05-03 LAB — TROPONIN I BLD-MCNC: <0.04 NG/ML (ref 0–0.08)

## 2025-05-03 NOTE — ED NOTES
Pt aware cardiac enzymes are elevated and Dr Coyle wants to repeat Trop however, pt refused. She said she is ready to go. I explained the purpose of the test and why it is repeated and she said \"I don't think my  will wait anymore and I am ready to go home.\" Report handed to Luis Enrique and Juan Francisco and they will make sure Dr Coyle is aware.

## 2025-05-03 NOTE — ED NOTES
Dr. Coyle discussed with patient benefits of repeating labs and completing evaluation as well as risks of leaving. Patient verbalized understanding of these benefits/risks. AMA paperwork signed. Patient instructed to continue diuretics and use of compression stockings.

## 2025-05-04 LAB
EKG ATRIAL RATE: 82 BPM
EKG DIAGNOSIS: NORMAL
EKG P AXIS: 34 DEGREES
EKG P-R INTERVAL: 192 MS
EKG Q-T INTERVAL: 402 MS
EKG QRS DURATION: 76 MS
EKG QTC CALCULATION (BAZETT): 469 MS
EKG R AXIS: -4 DEGREES
EKG T AXIS: 27 DEGREES
EKG VENTRICULAR RATE: 82 BPM

## 2025-05-04 PROCEDURE — 93010 ELECTROCARDIOGRAM REPORT: CPT | Performed by: INTERNAL MEDICINE

## 2025-05-05 ENCOUNTER — TRANSCRIBE ORDERS (OUTPATIENT)
Facility: HOSPITAL | Age: 60
End: 2025-05-05

## 2025-05-05 DIAGNOSIS — I73.9 PERIPHERAL VASCULAR DISEASE, UNSPECIFIED: Primary | ICD-10-CM

## 2025-05-12 ENCOUNTER — OFFICE VISIT (OUTPATIENT)
Age: 60
End: 2025-05-12
Payer: COMMERCIAL

## 2025-05-12 VITALS — BODY MASS INDEX: 34.73 KG/M2 | TEMPERATURE: 97 F | WEIGHT: 196 LBS | HEIGHT: 63 IN

## 2025-05-12 DIAGNOSIS — M54.16 LUMBAR NEURITIS: ICD-10-CM

## 2025-05-12 DIAGNOSIS — M47.816 LUMBAR FACET ARTHROPATHY: ICD-10-CM

## 2025-05-12 DIAGNOSIS — M48.061 FORAMINAL STENOSIS OF LUMBAR REGION: Primary | ICD-10-CM

## 2025-05-12 PROCEDURE — 99214 OFFICE O/P EST MOD 30 MIN: CPT | Performed by: PHYSICAL MEDICINE & REHABILITATION

## 2025-05-12 NOTE — PROGRESS NOTES
[facility]  [address]  [place] [id number]  Tel: [phone]  Fax: [phone]          PROGRESS NOTE      HISTORY OF PRESENT ILLNESS:  The patient is a 59 y.o. female and was seen today for follow up of lower back pain with symptoms into the hips bilaterally, radiating into the RLE extending anteriorly to the foot, and distal left lower extremity pain, ongoing for 4 months with no specific injury. Patient says her pain is progressive in nature. She denies change in bowel or bladder habits. Her pain is not positionally exacerbated. Pt has lost about 27 lbs unintentionally; due to pain. She denies recent fevers, rashes, or skin sores. She denies a hx of stomach ulcers or bleeding disorders. She denies a hx of history of spinal surgery or injections. She denies recent physical therapy or chiropractic care. She has a hx of DM. Blood glucose level of 230 mg/dL on 11/13/2024, states her levels trend in the 200s; managed by her PCP. She reports that to her knowledge her kidneys function properly, GFR of 72 on 11/13/2024. She has taken Gabapentin; no relief, Robaxin; no relief, Naproxen; no relief, Tramadol; no relief. Pt states she completed her EMG as [facility], we have requested her EMG results from 's office. PmHx of HTN, DM, obesity, claustrophobia, mini stroke (2005, on ASPIRIN managed by PCP). Note from FNP Yolanda dated 11/15/2024 indicating patient was seen for lumbar stenosis with LLE radicula symptoms. Last OV her Gabapentin was increased. Referred to me for ROJELIO L4/5. Note from  dated 12/10/2024 indicating patient was seen for severe back pain with symptoms into the right hip and left leg. Her lower extremity pain is worse than her back pain. She was very emotional in the office today. Says her legs feel very weak. Dysesthesias in lower extremities. Uncertain of the source of the pain, does not appear to be clearly spinal. Unclear

## 2025-05-13 ENCOUNTER — TELEPHONE (OUTPATIENT)
Age: 60
End: 2025-05-13

## 2025-05-13 DIAGNOSIS — M25.561 RIGHT KNEE PAIN, UNSPECIFIED CHRONICITY: ICD-10-CM

## 2025-05-13 DIAGNOSIS — M17.11 PRIMARY OSTEOARTHRITIS OF RIGHT KNEE: ICD-10-CM

## 2025-05-13 DIAGNOSIS — M47.816 LUMBAR FACET ARTHROPATHY: ICD-10-CM

## 2025-05-13 DIAGNOSIS — M48.061 FORAMINAL STENOSIS OF LUMBAR REGION: Primary | ICD-10-CM

## 2025-05-13 DIAGNOSIS — M54.16 LUMBAR NEURITIS: ICD-10-CM

## 2025-05-13 NOTE — TELEPHONE ENCOUNTER
Pain management referral was not placed at the time of visit yesterday. I have pended a Pain Management Referral to Dr. Bates and will be letting the patient know once it is signed

## 2025-05-13 NOTE — TELEPHONE ENCOUNTER
The pt was identified using 2 pt identifiers. Pt was made aware the referral was sent to Dr. Bates. Pt was very thankful and will reach out to the office if she has any further questions or concerns.

## 2025-05-13 NOTE — TELEPHONE ENCOUNTER
Patient called in and wanted to know who was she referred to for Pain Management by Dr. Mcdowell  Please advise patient @

## 2025-05-14 ENCOUNTER — CLINICAL DOCUMENTATION (OUTPATIENT)
Age: 60
End: 2025-05-14

## 2025-05-15 NOTE — PROGRESS NOTES
EKG: NSR, rate 80, no ectopy interpreted by me    Pt care assumed from SIENNA Lopez,  ED provider. Pt complaint(s), current treatment plan, progression and available diagnostic results have been discussed thoroughly. The patient was seen and evaluated on her shift/attendant and endorsed to me   Intended Disposition: D/C home  Pending diagnostic reports and/or labs (please list):

## 2025-05-15 NOTE — TELEPHONE ENCOUNTER
I tried calling the pt to discuss but there was no answer. I LMOM with my name and the office phone number requesting a call back to discuss.

## 2025-05-15 NOTE — TELEPHONE ENCOUNTER
Patient called to Dr. Bates's office and they are not able to schedule the patient until the end of June or July, so the patient wants to know if she can be referred to a different doctor that can see her sooner in the [place] or [place] location.    Patient tel [phone]

## 2025-05-15 NOTE — TELEPHONE ENCOUNTER
Patient called back and The pt was identified using 2 pt identifiers. Pt was made aware and will be reaching out to Dr. Eduardo office to schedule and will reach out to Dr. Sahu office to see if they can assist with anything until she is seen by Dr. Bates.

## 2025-05-15 NOTE — TELEPHONE ENCOUNTER
The pt was identified using 2 pt identifiers. I called and discussed with the patient, Unfortunately that is the time frame for any Pain Management and there are none that are in the areas she is looking for. Patient let me know that she is in so much pain and feels like waiting until the end of June,  beginning of July is not feasible. I let the patient know to call Dr. Eduardo office back and get scheduled because that is the soonest I have heard of them getting a patient in, but I would still send a message to Dr. Mcdowell to see if there was anything he could prescribe or recommend until she is seen by a pain management specialist.

## 2025-06-10 ENCOUNTER — HOSPITAL ENCOUNTER (OUTPATIENT)
Age: 60
Discharge: HOME | End: 2025-06-13
Payer: COMMERCIAL

## 2025-06-10 DIAGNOSIS — M43.10 SPONDYLOLISTHESIS, ACQUIRED: ICD-10-CM

## 2025-06-10 PROCEDURE — 72110 X-RAY EXAM L-2 SPINE 4/>VWS: CPT

## 2025-06-19 ENCOUNTER — HOSPITAL ENCOUNTER (OUTPATIENT)
Facility: HOSPITAL | Age: 60
Discharge: HOME | End: 2025-06-22
Payer: COMMERCIAL

## 2025-06-19 ENCOUNTER — TRANSCRIBE ORDERS (OUTPATIENT)
Facility: HOSPITAL | Age: 60
End: 2025-06-19

## 2025-06-19 DIAGNOSIS — E11.319 TYPE 2 DIABETES MELLITUS WITH RETINOPATHY, MACULAR EDEMA PRESENCE UNSPECIFIED, UNSPECIFIED LATERALITY, UNSPECIFIED RETINOPATHY SEVERITY, UNSPECIFIED WHETHER LONG TERM INSULIN USE (HCC): ICD-10-CM

## 2025-06-19 DIAGNOSIS — Z01.812 PRE-OPERATIVE LABORATORY EXAMINATION: ICD-10-CM

## 2025-06-19 DIAGNOSIS — E11.319 TYPE 2 DIABETES MELLITUS WITH RETINOPATHY, MACULAR EDEMA PRESENCE UNSPECIFIED, UNSPECIFIED LATERALITY, UNSPECIFIED RETINOPATHY SEVERITY, UNSPECIFIED WHETHER LONG TERM INSULIN USE (HCC): Primary | ICD-10-CM

## 2025-06-19 LAB
ALBUMIN SERPL-MCNC: 3.4 G/DL (ref 3.4–5)
ALBUMIN/GLOB SERPL: 1.2 (ref 0.8–1.7)
ALP SERPL-CCNC: 47 U/L (ref 45–117)
ALT SERPL-CCNC: 12 U/L (ref 10–35)
ANION GAP SERPL CALC-SCNC: 12 MMOL/L (ref 3–18)
AST SERPL-CCNC: 18 U/L (ref 10–38)
BASOPHILS # BLD: 0.07 K/UL (ref 0–0.1)
BASOPHILS NFR BLD: 0.9 % (ref 0–2)
BILIRUB SERPL-MCNC: <0.2 MG/DL (ref 0.2–1)
BUN SERPL-MCNC: 17 MG/DL (ref 6–23)
BUN/CREAT SERPL: 16 (ref 12–20)
CALCIUM SERPL-MCNC: 9.8 MG/DL (ref 8.5–10.1)
CHLORIDE SERPL-SCNC: 100 MMOL/L (ref 98–107)
CO2 SERPL-SCNC: 23 MMOL/L (ref 21–32)
CREAT SERPL-MCNC: 1.09 MG/DL (ref 0.6–1.3)
DIFFERENTIAL METHOD BLD: ABNORMAL
EOSINOPHIL # BLD: 0.11 K/UL (ref 0–0.4)
EOSINOPHIL NFR BLD: 1.4 % (ref 0–5)
ERYTHROCYTE [DISTWIDTH] IN BLOOD BY AUTOMATED COUNT: 13.4 % (ref 11.6–14.5)
EST. AVERAGE GLUCOSE BLD GHB EST-MCNC: 150 MG/DL
GLOBULIN SER CALC-MCNC: 2.9 G/DL (ref 2–4)
GLUCOSE SERPL-MCNC: 105 MG/DL (ref 74–108)
HBA1C MFR BLD: 6.9 % (ref 4.2–5.6)
HCT VFR BLD AUTO: 29.9 % (ref 35–45)
HGB BLD-MCNC: 9.7 G/DL (ref 12–16)
IMM GRANULOCYTES # BLD AUTO: 0.03 K/UL (ref 0–0.04)
IMM GRANULOCYTES NFR BLD AUTO: 0.4 % (ref 0–0.5)
LYMPHOCYTES # BLD: 3 K/UL (ref 0.9–3.6)
LYMPHOCYTES NFR BLD: 37.6 % (ref 21–52)
MCH RBC QN AUTO: 27.4 PG (ref 24–34)
MCHC RBC AUTO-ENTMCNC: 32.4 G/DL (ref 31–37)
MCV RBC AUTO: 84.5 FL (ref 78–100)
MONOCYTES # BLD: 0.59 K/UL (ref 0.05–1.2)
MONOCYTES NFR BLD: 7.4 % (ref 3–10)
NEUTS SEG # BLD: 4.17 K/UL (ref 1.8–8)
NEUTS SEG NFR BLD: 52.3 % (ref 40–73)
NRBC # BLD: 0 K/UL (ref 0–0.01)
NRBC BLD-RTO: 0 PER 100 WBC
PLATELET # BLD AUTO: 441 K/UL (ref 135–420)
PMV BLD AUTO: 10.3 FL (ref 9.2–11.8)
POTASSIUM SERPL-SCNC: 4.7 MMOL/L (ref 3.5–5.5)
PROT SERPL-MCNC: 6.3 G/DL (ref 6.4–8.2)
RBC # BLD AUTO: 3.54 M/UL (ref 4.2–5.3)
SODIUM SERPL-SCNC: 135 MMOL/L (ref 136–145)
WBC # BLD AUTO: 8 K/UL (ref 4.6–13.2)

## 2025-06-19 PROCEDURE — 85025 COMPLETE CBC W/AUTO DIFF WBC: CPT

## 2025-06-19 PROCEDURE — 36415 COLL VENOUS BLD VENIPUNCTURE: CPT

## 2025-06-19 PROCEDURE — 80053 COMPREHEN METABOLIC PANEL: CPT

## 2025-06-19 PROCEDURE — 93005 ELECTROCARDIOGRAM TRACING: CPT

## 2025-06-19 PROCEDURE — 83036 HEMOGLOBIN GLYCOSYLATED A1C: CPT

## 2025-06-20 LAB
EKG ATRIAL RATE: 86 BPM
EKG DIAGNOSIS: NORMAL
EKG P AXIS: 60 DEGREES
EKG P-R INTERVAL: 174 MS
EKG Q-T INTERVAL: 384 MS
EKG QRS DURATION: 74 MS
EKG QTC CALCULATION (BAZETT): 459 MS
EKG R AXIS: 6 DEGREES
EKG T AXIS: 41 DEGREES
EKG VENTRICULAR RATE: 86 BPM

## 2025-06-20 PROCEDURE — 93010 ELECTROCARDIOGRAM REPORT: CPT | Performed by: INTERNAL MEDICINE

## (undated) DEVICE — KIT CLN UP BON SECOURS MARYV

## (undated) DEVICE — CULTURETTE SPEC COLL AND TRNSPRT HNDL L5.25IN TAMP EVIDENT

## (undated) DEVICE — PACK PROCEDURE SURG MAJ W/ BASIN LF

## (undated) DEVICE — TABLE COVER: Brand: CONVERTORS

## (undated) DEVICE — HEX-LOCKING BLADE ELECTRODE: Brand: EDGE

## (undated) DEVICE — DRAPE TWL SURG 16X26IN BLU ORB04] ALLCARE INC]

## (undated) DEVICE — INTENDED FOR TISSUE SEPARATION, AND OTHER PROCEDURES THAT REQUIRE A SHARP SURGICAL BLADE TO PUNCTURE OR CUT.: Brand: BARD-PARKER SAFETY BLADES SIZE 10, STERILE

## (undated) DEVICE — THREE-QUARTER SHEET: Brand: CONVERTORS

## (undated) DEVICE — MAYO STAND COVER: Brand: CONVERTORS

## (undated) DEVICE — SOLUTION IV 1000ML 0.9% SOD CHL

## (undated) DEVICE — GAUZE,SPONGE,4"X4",16PLY,STRL,LF,10/TRAY: Brand: MEDLINE

## (undated) DEVICE — BLADE ASSEMB CLP HAIR FINE --

## (undated) DEVICE — DRAPE,EXTREMITY,89X128,STERILE: Brand: MEDLINE

## (undated) DEVICE — GOWN,REINFORCED,POLY,AURORA,XLARGE,STRL: Brand: MEDLINE

## (undated) DEVICE — LIGHT HANDLE: Brand: DEVON

## (undated) DEVICE — STERILE POLYISOPRENE POWDER-FREE SURGICAL GLOVES: Brand: PROTEXIS

## (undated) DEVICE — DRAPE,HAND,STERILE: Brand: MEDLINE

## (undated) DEVICE — SWAB CULT LIQ STUART AGR AERB MOD IN BRK SGL RAYON TIP PLAS 220099] BECTON DICKINSON MICRO]

## (undated) DEVICE — REM POLYHESIVE ADULT PATIENT RETURN ELECTRODE: Brand: VALLEYLAB

## (undated) DEVICE — DRSG GZ OIL EMUL CURAD 3X3 --

## (undated) DEVICE — BANDAGE,GAUZE,BULKEE II,4.5"X4.1YD,STRL: Brand: MEDLINE

## (undated) DEVICE — Device